# Patient Record
Sex: FEMALE | Race: WHITE | NOT HISPANIC OR LATINO | Employment: FULL TIME | URBAN - METROPOLITAN AREA
[De-identification: names, ages, dates, MRNs, and addresses within clinical notes are randomized per-mention and may not be internally consistent; named-entity substitution may affect disease eponyms.]

---

## 2023-08-04 ENCOUNTER — HOSPITAL ENCOUNTER (OUTPATIENT)
Dept: DATA CONVERSION | Facility: HOSPITAL | Age: 63
End: 2023-08-05
Attending: ORTHOPAEDIC SURGERY | Admitting: ORTHOPAEDIC SURGERY
Payer: COMMERCIAL

## 2023-08-04 DIAGNOSIS — M17.11 UNILATERAL PRIMARY OSTEOARTHRITIS, RIGHT KNEE: ICD-10-CM

## 2023-08-04 LAB
POCT GLUCOSE: 173 MG/DL (ref 74–99)
POCT GLUCOSE: 209 MG/DL (ref 74–99)
POCT GLUCOSE: 210 MG/DL (ref 74–99)

## 2023-08-05 LAB
ANION GAP IN SER/PLAS: 13 MMOL/L (ref 10–20)
BASOPHILS (10*3/UL) IN BLOOD BY AUTOMATED COUNT: 0.04 X10E9/L (ref 0–0.1)
BASOPHILS/100 LEUKOCYTES IN BLOOD BY AUTOMATED COUNT: 0.2 % (ref 0–2)
CALCIUM (MG/DL) IN SER/PLAS: 8.2 MG/DL (ref 8.6–10.3)
CARBON DIOXIDE, TOTAL (MMOL/L) IN SER/PLAS: 25 MMOL/L (ref 21–32)
CHLORIDE (MMOL/L) IN SER/PLAS: 104 MMOL/L (ref 98–107)
CREATININE (MG/DL) IN SER/PLAS: 0.9 MG/DL (ref 0.5–1.05)
ERYTHROCYTE DISTRIBUTION WIDTH (RATIO) BY AUTOMATED COUNT: 13.4 % (ref 11.5–14.5)
ERYTHROCYTE MEAN CORPUSCULAR HEMOGLOBIN CONCENTRATION (G/DL) BY AUTOMATED: 31.1 G/DL (ref 32–36)
ERYTHROCYTE MEAN CORPUSCULAR VOLUME (FL) BY AUTOMATED COUNT: 83 FL (ref 80–100)
ERYTHROCYTES (10*6/UL) IN BLOOD BY AUTOMATED COUNT: 4.58 X10E12/L (ref 4–5.2)
GFR FEMALE: 73 ML/MIN/1.73M2
GLUCOSE (MG/DL) IN SER/PLAS: 253 MG/DL (ref 74–99)
HEMATOCRIT (%) IN BLOOD BY AUTOMATED COUNT: 38 % (ref 36–46)
HEMOGLOBIN (G/DL) IN BLOOD: 11.8 G/DL (ref 12–16)
IMMATURE GRANULOCYTES/100 LEUKOCYTES IN BLOOD BY AUTOMATED COUNT: 0.9 % (ref 0–0.9)
LEUKOCYTES (10*3/UL) IN BLOOD BY AUTOMATED COUNT: 17 X10E9/L (ref 4.4–11.3)
LYMPHOCYTES (10*3/UL) IN BLOOD BY AUTOMATED COUNT: 0.68 X10E9/L (ref 1.2–4.8)
LYMPHOCYTES/100 LEUKOCYTES IN BLOOD BY AUTOMATED COUNT: 4 % (ref 13–44)
MONOCYTES (10*3/UL) IN BLOOD BY AUTOMATED COUNT: 1.12 X10E9/L (ref 0.1–1)
MONOCYTES/100 LEUKOCYTES IN BLOOD BY AUTOMATED COUNT: 6.6 % (ref 2–10)
NEUTROPHILS (10*3/UL) IN BLOOD BY AUTOMATED COUNT: 15.01 X10E9/L (ref 1.2–7.7)
NEUTROPHILS/100 LEUKOCYTES IN BLOOD BY AUTOMATED COUNT: 88.3 % (ref 40–80)
PLATELETS (10*3/UL) IN BLOOD AUTOMATED COUNT: 264 X10E9/L (ref 150–450)
POCT GLUCOSE: 247 MG/DL (ref 74–99)
POCT GLUCOSE: 262 MG/DL (ref 74–99)
POTASSIUM (MMOL/L) IN SER/PLAS: 4.5 MMOL/L (ref 3.5–5.3)
SODIUM (MMOL/L) IN SER/PLAS: 137 MMOL/L (ref 136–145)
UREA NITROGEN (MG/DL) IN SER/PLAS: 21 MG/DL (ref 6–23)

## 2023-08-06 LAB
ANION GAP IN SER/PLAS: NORMAL
BASOPHILS (10*3/UL) IN BLOOD BY AUTOMATED COUNT: NORMAL
BASOPHILS/100 LEUKOCYTES IN BLOOD BY AUTOMATED COUNT: NORMAL
CALCIUM (MG/DL) IN SER/PLAS: NORMAL
CARBON DIOXIDE, TOTAL (MMOL/L) IN SER/PLAS: NORMAL
CHLORIDE (MMOL/L) IN SER/PLAS: NORMAL
CREATININE (MG/DL) IN SER/PLAS: NORMAL
EOSINOPHILS (10*3/UL) IN BLOOD BY AUTOMATED COUNT: NORMAL
EOSINOPHILS/100 LEUKOCYTES IN BLOOD BY AUTOMATED COUNT: NORMAL
ERYTHROCYTE DISTRIBUTION WIDTH (RATIO) BY AUTOMATED COUNT: NORMAL
ERYTHROCYTE MEAN CORPUSCULAR HEMOGLOBIN CONCENTRATION (G/DL) BY AUTOMATED: NORMAL
ERYTHROCYTE MEAN CORPUSCULAR VOLUME (FL) BY AUTOMATED COUNT: NORMAL
ERYTHROCYTES (10*6/UL) IN BLOOD BY AUTOMATED COUNT: NORMAL
GFR FEMALE: NORMAL
GFR MALE: NORMAL
GLUCOSE (MG/DL) IN SER/PLAS: NORMAL
HEMATOCRIT (%) IN BLOOD BY AUTOMATED COUNT: NORMAL
HEMOGLOBIN (G/DL) IN BLOOD: NORMAL
IMMATURE GRANULOCYTES/100 LEUKOCYTES IN BLOOD BY AUTOMATED COUNT: NORMAL
LEUKOCYTES (10*3/UL) IN BLOOD BY AUTOMATED COUNT: NORMAL
LYMPHOCYTES (10*3/UL) IN BLOOD BY AUTOMATED COUNT: NORMAL
LYMPHOCYTES/100 LEUKOCYTES IN BLOOD BY AUTOMATED COUNT: NORMAL
MANUAL DIFFERENTIAL Y/N: NORMAL
MONOCYTES (10*3/UL) IN BLOOD BY AUTOMATED COUNT: NORMAL
MONOCYTES/100 LEUKOCYTES IN BLOOD BY AUTOMATED COUNT: NORMAL
NEUTROPHILS (10*3/UL) IN BLOOD BY AUTOMATED COUNT: NORMAL
NEUTROPHILS/100 LEUKOCYTES IN BLOOD BY AUTOMATED COUNT: NORMAL
NRBC (PER 100 WBCS) BY AUTOMATED COUNT: NORMAL
PLATELETS (10*3/UL) IN BLOOD AUTOMATED COUNT: NORMAL
POTASSIUM (MMOL/L) IN SER/PLAS: NORMAL
SODIUM (MMOL/L) IN SER/PLAS: NORMAL
UREA NITROGEN (MG/DL) IN SER/PLAS: NORMAL

## 2023-09-30 NOTE — PROGRESS NOTES
Service: Orthopaedics     Subjective Data:   DEMETRIO GARCIA is a 63 year old Female who is Hospital Day # 2 and POD #1 for 1. RIGHT TOTAL KNEE ARTHROPLASTY;    Objective Data:     Objective Information:      T   P  R  BP   MAP  SpO2   Value  36.5  67 18 159/77   110  94%  Date/Time 8/5 5:28 8/5 5:28 8/5 5:28 8/5 5:28  8/5 5:28 8/5 5:28  Range  (36.3C - 36.5C )  (67 - 71 )  (18 - 18 )  (146 - 159 )/ (71 - 77 )  (102 - 110 )  (94% - 96% )      Pain reported at 8/5 5:59: sleeping      T   P  R  BP   MAP  SpO2   Value  36.5  67 18 159/77   110  94%  Date/Time 8/5 5:28 8/5 5:28 8/5 5:28 8/5 5:28  8/5 5:28 8/5 5:28  Range  (36.3C - 36.5C )  (67 - 71 )  (18 - 18 )  (146 - 159 )/ (71 - 77 )  (102 - 110 )  (94% - 96% )        Pain reported at 8/5 5:59: sleeping         Weights   8/4 10:14: Weight in kg (Weight (kg))  120.2  8/4 10:14: Weight in lbs ((lbs))  264.9    Medication:    Medications:          Continuous Medications       --------------------------------    1. Lactated Ringers Infusion:  1000  mL  IntraVenous  <Continuous>    2. Lactated Ringers Infusion:  1000  mL  IntraVenous  <Continuous>         Scheduled Medications       --------------------------------    1. Acetaminophen:  650  mg  Oral  Every 6 Hours    2. Docusate:  100  mg  Oral  2 Times a Day    3. Empagliflozin:  25  mg  Oral  Daily    4. Insulin Lispro Mild Corrective Scale:  unit(s)  SubCutaneous  3 Times a Day Before Meals    5. Ketorolac Injectable:  15  mg  IntraVenous Push  Every 6 Hours    6. Metoprolol Succinate Extended Release:  100  mg  Oral  Daily    7. Pantoprazole:  40  mg  Oral  Daily    8. Polyethylene Glycol:  17  gram(s)  Oral  2 Times a Day    9. Rivaroxaban:  10  mg  Oral  Daily         PRN Medications       --------------------------------    1. Cyclobenzaprine:  10  mg  Oral  3 Times a Day    2. Dextrose 50% in Water Injectable:  25  gram(s)  IntraVenous Push  Every 15 Minutes    3. diphenhydrAMINE Injectable:  12.5   mg  IntraVenous Push  Every 6 Hours    4. Glucagon Injectable:  1  mg  IntraMuscular  Every 15 Minutes    5. Magnesium Hydroxide -Al Hydrox -Simethicone Oral Liquid:  30  mL  Oral  Every 6 Hours    6. Morphine Injectable:  2  mg  IntraVenous Push  Every 4 Hours    7. Ondansetron Injectable:  4  mg  IntraVenous Push  Every 6 Hours    8. oxyCODONE Immediate Release:  2.5  mg  Oral  Every 4 Hours    9. oxyCODONE Immediate Release:  5  mg  Oral  Every 4 Hours    10. oxyCODONE Immediate Release:  7.5  mg  Oral  Every 4 Hours        Recent Lab Results:    Results:    CBC: 8/5/2023 07:14              \     Hgb     /                              \     11.8 L    /  WBC  ----------------  Plt               17.0 H    ----------------    264              /     Hct     \                              /     38.0       \            RBC: 4.58     MCV: 83     Neutrophil %: 88.3      BMP: 8/5/2023 07:15  NA+        Cl-     BUN  /                         137    104    21  /  --------------------------------  Glucose                ---------------------------  253 H    K+     HCO3-   Creat \                         4.5  25    0.90  \  Calcium : 8.2 L    Anion Gap : 13        I have reviewed these laboratory results: Basic Metabolic Panel [Drawn 05-Aug-2023 07:15:00], Complete Blood Count + Differential [Drawn 05-Aug-2023 07:14:00].    Assessment and Plan:        Admitting Dx:   Status post total knee replacement: Entered Date: 04-Aug-2023  13:11    Comorbidities:  ·  Comorbidity obesity   ·  Obesity morbid obesity (BMI 40+)   ·  BMI 44.1     Code Status:  ·  Code Status Full Code     Assessment:    Assessment    pt is doing really good.  able to move around, has been able to ambulate and use the restroom, pain is well controlled, denies any nausea vomiting, SOB or chest pain. doing. pt's incision is clean dry and intact and area is soft. pt is doing  good and  states he is ready to get going. pt would like to go to skilled  facility- but we spoke with her that she does really  good       PLAN   PT/ OT and continued mobility   Home with HHC   has a walker   DVT prophylaxis    pain meds      Electronic Signatures:  Perez Walls ()   (Signed 05-Aug-2023 09:20)   Co-Signer: Service, Assessment/Plan Review, Subjective Data, Objective Data, Assessment and Plan, Note Completion  Laurie Shabazz (APRN-CNP)   (Signed 05-Aug-2023 08:41)   Authored: Service, Assessment/Plan Review, Subjective Data, Objective Data, Assessment and Plan, Note Completion    Last Updated: 05-Aug-2023 09:20 by Perez Walls ()

## 2023-09-30 NOTE — H&P
History & Physical Reviewed:   I have reviewed the History and Physical dated:  02-Aug-2023   History and Physical reviewed and relevant findings noted. Patient examined to review pertinent physical  findings.: No significant changes   Home Medications Reviewed: no changes noted   Allergies Reviewed: no changes noted     Consent:   COVID-19 Consent:  ·  COVID-19 Risk Consent Surgeon has reviewed key risks related to the risk of nirmal COVID-19 and if they contract COVID-19 what the risks are.       Electronic Signatures:  Isaiah Shaw)  (Signed 04-Aug-2023 12:05)   Authored: History & Physical Reviewed, Consent, Note  Completion      Last Updated: 04-Aug-2023 12:05 by Isaiah Shaw)

## 2023-09-30 NOTE — DISCHARGE SUMMARY
Send Summary:   Discharge Summary Providers:  Provider Role Provider Name   · Referring Mary Cortez   · Attending Isaiah Shaw   · Primary Mary Cortez   · Primary Richard Montes De Oca       Note Recipients: Mary Cortez DO (Resident)  Isaiah Shaw MD - [id number] [PREFERRED]       Discharge:    Summary:   Admission Date: .04-Aug-2023 09:20:00   Discharge Date: 05-Aug-2023   Attending Physician at Discharge: Isaiah Shaw   Admission Reason: s/p total knee   Final Discharge Diagnoses: Status post total knee  replacement   Procedures: Date: 04-Aug-2023 14:32:00  Procedure Name: 1. RIGHT TOTAL KNEE ARTHROPLASTY   Condition at Discharge: Satisfactory   Disposition at Discharge: .Home   Vital Signs:        T   P  R  BP   MAP  SpO2   Value  36.1  67  20  128/62   89  95%  Date/Time 8/5 8:55 8/5 8:55 8/5 8:55 8/5 8:55  8/5 8:55 8/5 8:55  Range  (36.1C - 36.5C )  (67 - 71 )  (18 - 20 )  (128 - 159 )/ (62 - 77 )  (89 - 110 )  (94% - 96% )    Date:            Weight/Scale Type:  Height:   04-Aug-2023 10:14  120.2  kg / standing       Hospital Course:                                                  Discharge summary  This patient  was admitted to the hospital on 8/4/23 after undergoing total  knee without complications that morning.    During the postoperative period, while in hospital, patient was medically managed by the hospitalist. Please see medial notes and H&P for patients additional diagnoses.  Ortho agrees with all medical diagnoses and treatments while patient in hospital.  No significant or unexpected findings or abnormal ortho imaging were noted during the hospital stay  Hospital course  Patient tolerated surgical procedure well and there was no complications. Patient progressed adequately through their recovery during hospital stay including PT and rehabilitation.  DVT prophylaxis was implemented POD#1  Patient was then D/C to home in stable condition.    Patient was instructed on the use of pain  medications, the signs and symptoms of infection, and was given our number to call should they have any questions or concerns following discharge.          Discharge Information:    and Continuing Care:   Lab Results - Pending:    None  Radiology Results - Pending: None   Discharge Instructions:    Activity:           activity as tolerated.          May shower..            May not return to school/work.            May not drive.            Weight-bearing Instructions: full weight bearing.  per total knee    Nutrition/Diet:           resume normal diet    Wound Care:           Wound Site:   right knee          Change Dressin time   peel off rubber dressing on 23    Additional Orders:           Additional Instructions:                                                        Total Knee Replacement                                                                                                    Discharge Instructions    To prevent Clot formation, you have been placed on the following medication:    anticoagulation                                              Surgical Site Care:    Change dressing once a day and PRN (as needed).     Apply 4 x 4 sponge and light tape.     If glue present, leave open to air.    You may leave wound open to air after initial dressing removal, if wound is clean, dry and intact     Aquacel Ag dressing is present, do not remove dressing for 7 days, unless heavily saturated.    If heavily saturated, remove dressing and start using instructions above     Staples will be removed on post-operative day 14 and steri-strips applied Showering is permitted starting post op day 1 if waterproof Aquacel dressing is present or when the incision is covered with 4 x 4 and Tegaderm waterproof dressing     Until all areas of incision are healed.                                                 Physical Therapy:        Weight Bearing Status:  Weight bear as tolerated     total knee- protocol                                                                                                                             Pain Medications        You were given narcotics     Wean off pain medications as you deem appropriate as long as pain is under control Cold packs/Ice packs/Machine May be used every hour for 15-30 minutes as tolerated Be sure to have a barrier (cloth, clothing, towel) between the site an the ice pack to  prevent frostbite.    incentive spirometer 10 x every hour while awake for 2 weeks     surgical teds x3 weeks- removing only for skin care and hygiene     IF INCISION STARTS TO DRAIN CALL OFFICE RIGHT A WAY    Contact [facility] office if    Increased redness, swelling, drainage of any kind, and/or pain to surgery site. As well as new onset fevers and or chills. These could signify an infection. Calf or thigh tenderness to touch as well as increased swelling or redness. This could signify  a clot formation. Numbness or tingling to an area around the incision site or below the incision site (toes).Any rash appears, increased or new onset nausea/vomiting occur. This may indicate a reaction to a medication.    Phone # [phone].                                    Follow up with Surgeon in 2 weeks or as scheduled by the office     Home Care Certification:           Home Care Agency:    [facility] [phone]          Skilled Disciplines Ordered:   PT,  OT    Home Care Services:           Home Care Skilled Service:   Rehab (PT/OT/SP eval and treat),  wound care    Follow Up Appointments:    Follow-Up Appointment 01:           Physician/Dept/Service:   Dr RICARDA Darling          Reason for Referral:   right knee- post op          Call to Schedule in:   2 weeks,  or as already scheduled by the office          Location:   [address] [place]          Phone Number:   [phone]    Discharge Medications: Home Medication   Jardiance 25 mg oral tablet - 1 tab(s) orally once a day  (in the morning)  irbesartan 300 mg oral tablet - 1 tab(s) orally once a day  metFORMIN 1000 mg oral tablet - 1 tab(s) orally 2 times a day  metoprolol succinate 100 mg oral capsule, extended release - 1 cap(s) orally once a day-instructed to take morning of procedure with a sip of water  Januvia - 1 tab(s) orally once a day-  glimepiride 4 mg oral tablet - 1 tab(s) orally 2 times a day  oxyCODONE 5 mg oral tablet - 1 tab(s) orally every 4 hours, As needed, Pain - 7-10    and take 0.5 tabs (2.5mg) for pain 4-6  docusate sodium 100 mg oral capsule - 1 cap(s) orally 2 times a day  pantoprazole 40 mg oral delayed release tablet - 1 tab(s) orally once a day  apixaban 2.5 mg oral tablet - 1 tab(s) orally 2 times a day      PRN Medication   cyclobenzaprine 10 mg oral tablet - 1 tab(s) orally 3 times a day, As needed, Muscle Spasms. if a whole pill makes you too tired- please try half  carisoprodol 350 mg oral tablet - 1 tab(s) orally once a day, As Needed     DNR Status:   ·  Code Status Code Status order at time of discharge: Full Code       Electronic Signatures:  Laurie Shabazz (APRN-CNP)  (Signed 05-Aug-2023 11:26)   Authored: Send Summary, Summary Content, Ongoing Care,  DNR Status, Note Completion      Last Updated: 05-Aug-2023 11:26 by Laurie Shabazz ([facility]-CNP)

## 2023-10-01 NOTE — OP NOTE
PROCEDURE DETAILS    Preoperative Diagnosis:  Right knee degenerative joint disease    Postoperative Diagnosis:  Right knee degenerative joint disease    Surgeon: Isaiah Shaw  Resident/Fellow/Other Assistant: Niranjan Wesley    Procedure:  1. RIGHT TOTAL KNEE ARTHROPLASTY    Anesthesia: Spinal, sedation, regional   Estimated Blood Loss: 50 ml   Findings: see op report         Operative Report:   Implants: Charly triathlon size 4 CR femur size 4 tibia, 11 CS articular surface 32 patella  Operative findings:  Severe degenerative joint disease    Operative Indications:  Failed multiple non-surgical modalities and great difficulty performing activities of daily living as indicated in pre-operative notes.     Operative Procedure:  Patient was met prior to surgery in pre-operative holding.  The appropriate extremity was marked and recent health status was reviewed and we found no contraindication to proceeding with the scheduled procedure.  We again reviewed the risks of infection,  wound issues, deep venous thrombosis, pulmonary embolism, medical complications including cardiac and pulmonary, neurologic and vascular injury and incomplete relief of pre-operative pain.    The patient then underwent regional anesthesia in pre-op holding.  The patient was transported to the operating room.  A thigh tourniquet was placed and a small bump on the buttock.  The patient was resting comfortably in a supine position with all jorge  prominences well padded.  Sequential compression device was placed on the contralateral lower extremity.  An exam under anesthesia was performed to evaluate the patient´s range of motion and ligamentous integrity.    The patient was prepped and draped in the usual sterile fashion.  The leg was placed in a leg pendleton after appropriate padding.  After prep, drape, antibiotic and time out, the leg was exsanguinated and the tourniquet inflated.  A longitudinal incision  was made over the anterior  knee.  The dissection was carried out down through the skin and subcutaneous tissue.  A medial parapatellar arthrotomy was performed.  An effusion and synovitis was noted compatible with the grade IV changes of the articular  cartilage.  The fat pad was de-bulked, [place]´s line was marked and some of the deep medial collateral ligament was released from the tibia.  The ACL was resected. The knee was flexed up and medial and lateral retractors were placed to protect the  collateral ligaments and popliteus tendon.      Due to the degree of deformity and stiffness, the posterior cruciate ligament was resected.    A canal finder reamer was utilized to gain entry into the femur.  An intramedullary krishna was placed by hand and set to 5 degrees of valgus for the distal femoral cut.  The cutting block was placed and the distal femoral cut was performed.   A sizing guide  was then placed and the femoral size was measured based on posterior referencing.  The 4 in 1 cutting block was placed set to 3 degrees of external rotation.  The rotation was confirmed based on the transepicondylar axis and Carmen´s line.  There  was no significant hypoplasia of the posterior condyles.     Once the cutting block was placed the cuts were performed: anterior, posterior and chamfer cuts.  There collaterals were protected and the bone was removed.  There was no notching of the femur.   Once the femoral cuts were complete, we turned our attention  to the tibia.  Retractors were placed medial, lateral and posteriorly.  An extramedullary alignment krishna was used and the slope was set in accordance with the implant.  We measured 2 mm of resection from the lowest point on the tibia.  The tibial cut was  completed with care not to encroach posterior to the knee joint.    After the tibial cut was completed, we removed the remaining menisci.  Using a curved osteotome posterior osteophytes were carefully removed from the femur.  We then assessed the  flexion and extension gaps using trial spacer blocks.    The gaps appeared symmetric with a 9 mm spacer and we proceeded to placing trial implants.  The final poly was a 11 CS.    With symmetric gaps we progressed to placing trial implants.    A trial tibial component was placed with care to avoid overhang.  The rotation was set in line with the medial third of the tibial tubercle.  A trial femoral component was then placed, followed by a trial articular surface.  The knee was taken through  range of motion with the provisional components.  The flexion and extension gaps were evaluated, as well as the patellar tracking and mid-flexion stability.  The following soft tissue balancing, recuts, and/or modifications were required: none.      The patella was everted and ~9 mm of bone were removed from the thickest portion using a clamp/cutting guide.  The lug holes were drilled in the patella and femur.  The tibia was prepared with the drill and broach after confirmation of alignment using  a drop krishna.   The sclerotic areas of the bone were drilled using a small drill bit.  The capsule around the knee was injected using a long acting local anesthetic.    The cement (Omiro simplex) was mixed in a vacuum sealed fashion while the bone was pulsatile lavaged.   The bone was dried and the implants were placed with a gentle posterior directed force and a clamp on the patella.  The excess cement was removed.    After the cement dried the gap balancing was reassessed prior to placing the final articular surface.  The proud cement mantle was removed using a small osteotome.  The knee was then copiously lavaged with sterile saline and Irrisept (0.05% chlorhexidine  gluconate).  The tourniquet was taken down and hemostasis was obtained using Bovie electrocautery and tranexamic acid (per protocol).  No significant bleeders were encountered and the usage of a drain was not felt to be necessary.    A layered closure was performed using  1 Vicryl and 1 Stratafix in the retinacular layer and quadriceps tendon.  2-0 vicryl in the deep dermal layer and monofilament in the skin.  An adherent, water proof dressing was placed followed by Webril and an ace  bandage. All counts were reported as correct.  The patient was stable to the post anesthesia care unit.    I was present and participated in the entire procedure. The Physician Assistant participated in all critical elements of the procedure under my direct supervision. The surgical incision was closed by the PA under my indirect supervision. There were no  qualified residents available to assist.    Complications:  none  Estimated blood loss:  50 ml  Specimens:  None      The physician assistant was present for the entire case.  Given the nature of the procedure and disease process a skilled surgical assistant was  necessary for the case.  The assistant was necessary for retraction and helped directly facilitate completion of the surgery.  A certified scrub tech was at the back table managing instruments and supplies for the surgical procedure.  Note Recipients:   Mary Cortez DO (Resident)  Isaiah Shaw MD - [phone] [PREFERRED]                        Attestation:   Note Completion:  Attending Attestation I performed the procedure without a resident         Electronic Signatures:  Isaiah Shaw)  (Signed 04-Aug-2023 14:34)   Authored: Post-Operative Note, Chart Review, Note Completion      Last Updated: 04-Aug-2023 14:34 by Isaiah Shaw)

## 2023-10-02 ENCOUNTER — TREATMENT (OUTPATIENT)
Dept: PHYSICAL THERAPY | Facility: CLINIC | Age: 63
End: 2023-10-02
Payer: COMMERCIAL

## 2023-10-02 DIAGNOSIS — M17.11 PRIMARY OSTEOARTHRITIS OF RIGHT KNEE: Primary | ICD-10-CM

## 2023-10-02 PROCEDURE — 97110 THERAPEUTIC EXERCISES: CPT | Mod: GP | Performed by: PHYSICAL THERAPIST

## 2023-10-02 ASSESSMENT — PAIN SCALES - GENERAL: PAINLEVEL_OUTOF10: 2

## 2023-10-02 ASSESSMENT — ENCOUNTER SYMPTOMS
LOSS OF SENSATION IN FEET: 1
DEPRESSION: 0
OCCASIONAL FEELINGS OF UNSTEADINESS: 0

## 2023-10-02 NOTE — PROGRESS NOTES
Insurance    Insurance reviewed   Visit number: 7   Approved number of visits: 12   80/20  30 visits       Subjective:  Patient reports that her R knee is feeling better, a little stiff today.      Objective:  R knee flexion AROM 107 degrees after exercise  R knee flexion AROM 110 degrees after stretching  R knee flexion PROM 115 degrees after stretching    Assessment:  Therapeutic exercise performed in order to improve R knee strength/ROM/mobility.   PROM performed to improve R knee mobility and reduce overall stiffness/pain.  Patient progressing well towards her goals as well as her overall L knee strength/ROM.    Goals from IE:    By the end of 12 visits patient will be able to do the following with < 1/10 R knee pain:    HEP: Patient will consistently perform her home exercise program for 20-30 minute sessions, 1-2x/day, 3-4 days/week independently by the end of 12 visits.    Basic ADL's: Patient will perform bADL’s/instrumental ADL’s for 30 minutes moving between various closed kinetic chain postures.    ROM and Strength: Patient will demonstrate 5/5 R knee strength in all planes and 0-120 degrees R knee AROM in all planes to improve their ability to lift, stand, ambulate and perform basic ADL's.    Stair Negotiation: Patient will be able to ambulate up/down stairs for 1-2 flights at a time.    Gait/Locomotion: Patient will be able to ambulate for 30-60 minutes at a time.  Minimal to no gait deviation across level ground/stairs.    Work: Patient will return to work and perform normal work duties.    Sleep: Patient will sleep thru the night 4/7 nights/week.    Participation restrictions: Increase LEFS to > or = to 65/80 for increased functional ability.     Plan:    Continue with plan of care.

## 2023-10-04 ENCOUNTER — TREATMENT (OUTPATIENT)
Dept: PHYSICAL THERAPY | Facility: CLINIC | Age: 63
End: 2023-10-04
Payer: COMMERCIAL

## 2023-10-04 DIAGNOSIS — M17.11 PRIMARY OSTEOARTHRITIS OF RIGHT KNEE: Primary | ICD-10-CM

## 2023-10-04 PROCEDURE — 97110 THERAPEUTIC EXERCISES: CPT | Mod: GP | Performed by: PHYSICAL THERAPIST

## 2023-10-04 ASSESSMENT — PAIN SCALES - GENERAL: PAINLEVEL_OUTOF10: 2

## 2023-10-04 NOTE — PROGRESS NOTES
Physical Therapy    Physical Therapy Treatment    Patient Name: Savi Govea  MRN: [id number]  Today's Date: 10/4/2023      Insurance    Insurance reviewed   Visit number: 8  Approved number of visits: 12   80/20  30 visits       Assessment/Plan   PT Assessment  PT Assessment Results: Decreased strength, Decreased range of motion, Decreased endurance, Decreased mobility  Rehab Prognosis: GoodAssessment:  Therapeutic exercise performed in order to improve R knee strength/ROM/mobility.   PROM performed to improve R knee mobility and reduce overall stiffness/pain.  Patient progressing well towards her goals as well as her overall R knee strength/ROM.    Plan:  Continue with plan of care.        Subjective   Precautions:  Precautions  STEADI Fall Risk Score (The score of 4 or more indicates an increased risk of falling): 0  Precautions Comment: s/p R TKA 8/4/2023 by Dr. Isaiah Shaw    Subjective:  Patient reports that her R knee is feeling better, a little stiff today.       Objective   Pain:  Pain Assessment  Pain Score: 2  Pain Type: Chronic pain  Pain Location: Knee  Pain Orientation: Right     R knee flexion AAROM 114 degrees after exercise    Treatments:  Therapeutic Exercise  Therapeutic Exercise Performed: Yes  Therapeutic Exercise Activity 1: Bike x 7 minutes  Therapeutic Exercise Activity 2: Lunge stretch at steps, x 30 reps  Therapeutic Exercise Activity 3: Mini-Squats x 30 reps  Therapeutic Exercise Activity 4: Forward step-ups 6 inch step, x 30 reps  Therapeutic Exercise Activity 5: Lateral Step-ups, 4 inch step, x 30 reps  Therapeutic Exercise Activity 6: Cybex Leg press 2 plates, 3 sets, 10 reps  Therapeutic Exercise Activity 7: [place] ball squat x 20 reps  Therapeutic Exercise Activity 8: Cable column TKE's 4 plates, 3 sets, 10 reps  Therapeutic Exercise Activity 9: supine bridges x 30 reps  Therapeutic Exercise Activity 10: supine heel slides x 30 reps    Treatment    Time in clinic started at   2pm  Time in clinic ended at  2:43pm  Total time in clinic is . 43 minutes  Total timed code time is  38 minutes    Treatment Performed Today:.   TE x 3 units

## 2023-10-08 PROBLEM — M16.12 OSTEOARTHRITIS OF LEFT HIP: Status: ACTIVE | Noted: 2023-10-08

## 2023-10-08 PROBLEM — M54.12 CERVICAL NEURITIS: Status: ACTIVE | Noted: 2023-10-08

## 2023-10-08 PROBLEM — E66.9 OBESITY: Status: ACTIVE | Noted: 2023-10-08

## 2023-10-08 PROBLEM — R73.9 HYPERGLYCEMIA: Status: ACTIVE | Noted: 2023-10-08

## 2023-10-08 PROBLEM — M62.89 ABNORMAL INCREASED MUSCLE TONE: Status: ACTIVE | Noted: 2017-01-18

## 2023-10-08 PROBLEM — M77.8 SHOULDER CAPSULITIS, RIGHT: Status: ACTIVE | Noted: 2023-10-08

## 2023-10-08 PROBLEM — M25.512 CHRONIC LEFT SHOULDER PAIN: Status: ACTIVE | Noted: 2017-01-18

## 2023-10-08 PROBLEM — M54.2 NECK PAIN: Status: ACTIVE | Noted: 2017-01-18

## 2023-10-08 PROBLEM — E11.9 DIABETES MELLITUS (MULTI): Status: ACTIVE | Noted: 2023-10-08

## 2023-10-08 PROBLEM — M54.12 LEFT CERVICAL RADICULOPATHY: Status: ACTIVE | Noted: 2017-01-18

## 2023-10-08 PROBLEM — M47.22 CERVICAL SPONDYLOSIS WITH RADICULOPATHY: Status: ACTIVE | Noted: 2017-01-18

## 2023-10-08 PROBLEM — M54.59 MECHANICAL LOW BACK PAIN: Status: ACTIVE | Noted: 2017-01-18

## 2023-10-08 PROBLEM — Z04.9 CONDITION NOT FOUND: Status: ACTIVE | Noted: 2023-10-08

## 2023-10-08 PROBLEM — M12.9 ARTHROPATHY OF MULTIPLE SITES: Status: ACTIVE | Noted: 2023-10-08

## 2023-10-08 PROBLEM — R53.83 FATIGUE: Status: ACTIVE | Noted: 2023-10-08

## 2023-10-08 PROBLEM — E78.5 HYPERLIPIDEMIA: Status: ACTIVE | Noted: 2023-10-08

## 2023-10-08 PROBLEM — M54.16 ACUTE LUMBAR RADICULOPATHY: Status: ACTIVE | Noted: 2023-10-08

## 2023-10-08 PROBLEM — M67.40 GANGLION CYST: Status: ACTIVE | Noted: 2023-10-08

## 2023-10-08 PROBLEM — G89.29 CHRONIC LEFT SHOULDER PAIN: Status: ACTIVE | Noted: 2017-01-18

## 2023-10-08 PROBLEM — Z96.651 STATUS POST RIGHT KNEE REPLACEMENT: Status: ACTIVE | Noted: 2023-10-08

## 2023-10-08 PROBLEM — E66.9 OBESITY: Status: ACTIVE | Noted: 2017-01-18

## 2023-10-08 PROBLEM — M25.569 KNEE PAIN: Status: ACTIVE | Noted: 2023-10-08

## 2023-10-08 RX ORDER — ALPHA LIPOIC ACID 300 MG
1 CAPSULE ORAL 2 TIMES DAILY
COMMUNITY

## 2023-10-08 RX ORDER — MELOXICAM 15 MG/1
15 TABLET ORAL DAILY
COMMUNITY
Start: 2014-12-16

## 2023-10-08 RX ORDER — SITAGLIPTIN 100 MG/1
1 TABLET, FILM COATED ORAL DAILY
COMMUNITY
Start: 2023-08-03

## 2023-10-08 RX ORDER — PHENYLEPHRINE HCL 10 MG
500 TABLET ORAL 2 TIMES DAILY
COMMUNITY
Start: 2019-10-02

## 2023-10-08 RX ORDER — MULTIVITAMIN
1 TABLET ORAL
COMMUNITY

## 2023-10-08 RX ORDER — BLOOD-GLUCOSE METER
EACH MISCELLANEOUS
COMMUNITY
Start: 2023-08-28

## 2023-10-08 RX ORDER — IRBESARTAN 300 MG/1
300 TABLET ORAL DAILY
COMMUNITY
Start: 2019-10-02

## 2023-10-08 RX ORDER — METFORMIN HYDROCHLORIDE 1000 MG/1
1000 TABLET ORAL 2 TIMES DAILY
COMMUNITY
Start: 2018-08-31 | End: 2023-12-21 | Stop reason: WASHOUT

## 2023-10-08 RX ORDER — LISINOPRIL 40 MG/1
1 TABLET ORAL DAILY
COMMUNITY
Start: 2019-10-02 | End: 2023-12-21 | Stop reason: WASHOUT

## 2023-10-08 RX ORDER — METFORMIN HYDROCHLORIDE 500 MG/1
1 TABLET ORAL 3 TIMES DAILY
COMMUNITY
End: 2023-12-21 | Stop reason: WASHOUT

## 2023-10-08 RX ORDER — FLUTICASONE PROPIONATE 50 MCG
1 SPRAY, SUSPENSION (ML) NASAL DAILY PRN
COMMUNITY
Start: 2023-08-29

## 2023-10-08 RX ORDER — METFORMIN HYDROCHLORIDE 500 MG/1
500 TABLET ORAL
COMMUNITY
End: 2023-12-21 | Stop reason: WASHOUT

## 2023-10-08 RX ORDER — NAPROXEN 500 MG/1
0.5 TABLET ORAL
COMMUNITY
Start: 2014-06-24 | End: 2023-12-21 | Stop reason: WASHOUT

## 2023-10-08 RX ORDER — LANCETS 33 GAUGE
EACH MISCELLANEOUS
COMMUNITY
Start: 2023-08-28

## 2023-10-08 RX ORDER — LORATADINE 10 MG/1
10 TABLET ORAL DAILY
COMMUNITY
Start: 2019-10-02

## 2023-10-08 RX ORDER — ONDANSETRON 4 MG/1
4 TABLET, ORALLY DISINTEGRATING ORAL EVERY 6 HOURS PRN
COMMUNITY
Start: 2023-08-28 | End: 2023-12-21 | Stop reason: ALTCHOICE

## 2023-10-08 RX ORDER — GABAPENTIN 600 MG/1
600 TABLET ORAL 3 TIMES DAILY
COMMUNITY
Start: 2017-01-23 | End: 2023-12-21 | Stop reason: WASHOUT

## 2023-10-08 RX ORDER — EMPAGLIFLOZIN 25 MG/1
25 TABLET, FILM COATED ORAL DAILY
COMMUNITY

## 2023-10-08 RX ORDER — GLIMEPIRIDE 2 MG/1
2 TABLET ORAL 2 TIMES DAILY
COMMUNITY
Start: 2019-03-07 | End: 2023-12-21 | Stop reason: WASHOUT

## 2023-10-08 RX ORDER — MAGNESIUM SULFATE 100 MG
CAPSULE ORAL
COMMUNITY
End: 2023-12-21 | Stop reason: WASHOUT

## 2023-10-08 RX ORDER — CARISOPRODOL 350 MG
350 TABLET ORAL DAILY
COMMUNITY

## 2023-10-08 RX ORDER — HYDROCODONE BITARTRATE AND ACETAMINOPHEN 5; 325 MG/1; MG/1
1 TABLET ORAL EVERY 8 HOURS PRN
COMMUNITY
End: 2024-01-06 | Stop reason: HOSPADM

## 2023-10-08 RX ORDER — AMMONIUM LACTATE 12 G/100G
LOTION TOPICAL DAILY PRN
COMMUNITY
Start: 2023-08-29

## 2023-10-08 RX ORDER — TAMSULOSIN HYDROCHLORIDE 0.4 MG/1
0.4 CAPSULE ORAL DAILY
COMMUNITY
Start: 2023-08-28 | End: 2023-12-21 | Stop reason: WASHOUT

## 2023-10-08 RX ORDER — CALCIUM CITRATE/VITAMIN D3 200-3.125
1 TABLET ORAL DAILY
COMMUNITY
Start: 2015-03-19

## 2023-10-08 RX ORDER — CALCIUM CARB, CITRATE, MALATE 250 MG
99 CAPSULE ORAL
COMMUNITY

## 2023-10-08 RX ORDER — OXYCODONE AND ACETAMINOPHEN 5; 325 MG/1; MG/1
2 TABLET ORAL EVERY 4 HOURS PRN
COMMUNITY
Start: 2012-02-17 | End: 2023-12-21 | Stop reason: WASHOUT

## 2023-10-08 RX ORDER — GLIMEPIRIDE 4 MG/1
TABLET ORAL
COMMUNITY
Start: 2023-08-09 | End: 2023-12-21 | Stop reason: WASHOUT

## 2023-10-08 RX ORDER — DICYCLOMINE HYDROCHLORIDE 20 MG/1
20 TABLET ORAL EVERY 6 HOURS
COMMUNITY
Start: 2023-08-28 | End: 2023-12-21 | Stop reason: ALTCHOICE

## 2023-10-08 RX ORDER — ZINC GLUCONATE 50 MG
1 TABLET ORAL DAILY
COMMUNITY

## 2023-10-09 ENCOUNTER — TREATMENT (OUTPATIENT)
Dept: PHYSICAL THERAPY | Facility: CLINIC | Age: 63
End: 2023-10-09
Payer: COMMERCIAL

## 2023-10-09 ENCOUNTER — APPOINTMENT (OUTPATIENT)
Dept: PHYSICAL THERAPY | Facility: CLINIC | Age: 63
End: 2023-10-09
Payer: COMMERCIAL

## 2023-10-09 DIAGNOSIS — M17.11 PRIMARY OSTEOARTHRITIS OF RIGHT KNEE: Primary | ICD-10-CM

## 2023-10-09 PROCEDURE — 97110 THERAPEUTIC EXERCISES: CPT | Mod: GP | Performed by: PHYSICAL THERAPIST

## 2023-10-09 ASSESSMENT — ENCOUNTER SYMPTOMS
OCCASIONAL FEELINGS OF UNSTEADINESS: 0
LOSS OF SENSATION IN FEET: 1
DEPRESSION: 0

## 2023-10-09 ASSESSMENT — PAIN SCALES - GENERAL: PAINLEVEL_OUTOF10: 2

## 2023-10-09 NOTE — PROGRESS NOTES
Physical Therapy    Physical Therapy Treatment    Patient Name: Savi Govea  MRN: [id number]  Today's Date: 10/9/2023      Insurance    Insurance reviewed   Visit number: 9  Approved number of visits: 12   80/20  30 visits       Diagnosis:  R knee OA, s/p R TKA 8/4/2023    Assessment/Plan   PT Assessment  PT Assessment Results: Decreased strength, Decreased range of motion, Decreased endurance, Impaired balance, Decreased mobility, Pain  Rehab Prognosis: Good  Evaluation/Treatment Tolerance: Patient tolerated treatment well, Patient limited by fatigue  Assessment:  Therapeutic exercise performed in order to improve R knee strength/ROM/mobility.   Patient progressing well towards her goals as well as her overall R knee strength/ROM.    Plan:  Continue with plan of care.      Subjective   Precautions:  Precautions  STEADI Fall Risk Score (The score of 4 or more indicates an increased risk of falling): 2  LE Weight Bearing Status: Weight Bearing as Tolerated  Splinting: none  Braces Applied: none  Precautions Comment: s/p R TKA 8/4/2023    Subjective:  Patient reports that her R knee is feeling a little sore today.     Objective   Pain:  Pain Assessment  Pain Score: 2  Pain Type: Chronic pain  Pain Location: Knee  Pain Orientation: Right     R knee flexion AAROM 115 degrees after exercise    Treatments:   Bike rocking x 7 minutes  Lunge stretch on step x 30 reps  Mini-squats x 30 reps  Forward/Lateral Step-ups 6 inch/4 inch x 30 reps each  HR x 30 reps  TB TKE's blue/maroon x 30 reps  Cybex Leg press 2 plates x 30 reps  Supine heel slides x 30 reps    Treatment    Time in clinic started at  2:30pm  Time in clinic ended at  3:13pm  Total time in clinic is . 43 minutes  Total timed code time is  38 minutes    Treatment Performed Today:.   TE x 3 units

## 2023-10-10 ENCOUNTER — APPOINTMENT (OUTPATIENT)
Dept: PHYSICAL THERAPY | Facility: CLINIC | Age: 63
End: 2023-10-10
Payer: COMMERCIAL

## 2023-10-11 ENCOUNTER — TREATMENT (OUTPATIENT)
Dept: PHYSICAL THERAPY | Facility: CLINIC | Age: 63
End: 2023-10-11
Payer: COMMERCIAL

## 2023-10-11 ENCOUNTER — APPOINTMENT (OUTPATIENT)
Dept: PHYSICAL THERAPY | Facility: CLINIC | Age: 63
End: 2023-10-11
Payer: COMMERCIAL

## 2023-10-11 DIAGNOSIS — M17.11 PRIMARY OSTEOARTHRITIS OF RIGHT KNEE: Primary | ICD-10-CM

## 2023-10-11 PROCEDURE — 97110 THERAPEUTIC EXERCISES: CPT | Mod: GP | Performed by: PHYSICAL THERAPIST

## 2023-10-11 ASSESSMENT — PAIN SCALES - GENERAL: PAINLEVEL_OUTOF10: 2

## 2023-10-11 NOTE — PROGRESS NOTES
Physical Therapy    Physical Therapy Treatment    Patient Name: Savi Govea  MRN: [id number]  Today's Date: 10/11/2023      Insurance    Insurance reviewed   Visit number: 10  Approved number of visits: 12   80/20  30 visits       Diagnosis:  R knee OA, s/p R TKA 8/4/2023    Time Calculation  Start Time: 1400  Stop Time: 1440  Time Calculation (min): 40 min    Assessment/Plan   PT Assessment  PT Assessment Results: Decreased strength, Decreased range of motion, Decreased endurance, Decreased mobility, Pain  Rehab Prognosis: Good  Evaluation/Treatment Tolerance: Patient tolerated treatment well  PT Plan  Inpatient/Swing Bed or Outpatient: Outpatient  OP PT Plan  Rehab Potential: Good  Plan of Care Agreement: Patient  Therapeutic exercise performed in order to improve R knee strength/ROM/mobility.  Patient requires increased tactile/verbal cues with exercise in order to reduce R knee stress/strain during the particular exercise performed.  Patient challenged with exercises performed in clinic secondary to R knee fatigue.     Plan:  progress as tolerated.    General Visit Information:   PT  Visit  PT Received On: 10/11/23     Subjective   Precautions:  Precautions  STEADI Fall Risk Score (The score of 4 or more indicates an increased risk of falling): 2  LE Weight Bearing Status: Weight Bearing as Tolerated  Braces Applied: none  Precautions Comment: s/p R TKA 8/4/2023    Objective   Pain:  Pain Assessment  Pain Score: 2  Pain Type: Chronic pain  Pain Location: Knee  Pain Orientation: Right    Treatments:  Therapeutic Exercise  Therapeutic Exercise Performed: Yes  Therapeutic Exercise Activity 1: Bike x 7 minutes  Therapeutic Exercise Activity 2: Lunge stretch at steps, x 30 reps  Therapeutic Exercise Activity 3: Mini-Squats x 30 reps  Therapeutic Exercise Activity 4: Forward step-ups 8 inch step, x 30 reps  Therapeutic Exercise Activity 5: Lateral Step-ups, 6 inch step, x 30 reps  Therapeutic Exercise Activity 6:  Cybex Leg press 2 plates, 3 sets, 10 reps  Therapeutic Exercise Activity 7: [place]uran ball squat x 20 reps  Therapeutic Exercise Activity 8: Cable column TKE's 4 plates, 3 sets, 10 reps  Therapeutic Exercise Activity 9: supine bridges x 30 reps  Therapeutic Exercise Activity 10: supine heel slides x 30 reps

## 2023-10-12 ENCOUNTER — APPOINTMENT (OUTPATIENT)
Dept: PHYSICAL THERAPY | Facility: CLINIC | Age: 63
End: 2023-10-12
Payer: COMMERCIAL

## 2023-10-16 ENCOUNTER — TREATMENT (OUTPATIENT)
Dept: PHYSICAL THERAPY | Facility: CLINIC | Age: 63
End: 2023-10-16
Payer: COMMERCIAL

## 2023-10-16 DIAGNOSIS — M17.11 PRIMARY OSTEOARTHRITIS OF RIGHT KNEE: Primary | ICD-10-CM

## 2023-10-16 PROCEDURE — 97110 THERAPEUTIC EXERCISES: CPT | Mod: GP | Performed by: PHYSICAL THERAPIST

## 2023-10-16 ASSESSMENT — PAIN SCALES - GENERAL: PAINLEVEL_OUTOF10: 2

## 2023-10-16 NOTE — PROGRESS NOTES
Physical Therapy    Physical Therapy Treatment    Patient Name: Savi Govea  MRN: [id number]  Today's Date: 10/16/2023      Insurance    Insurance reviewed   Visit number: 11  Approved number of visits: 12   80/20  30 visits       Diagnosis:  R knee OA, s/p R TKA 8/4/2023 by Dr. Isaiah Shaw       Assessment/Plan   PT Assessment  PT Assessment Results: Decreased strength, Decreased range of motion, Decreased endurance, Decreased mobility, Pain  Rehab Prognosis: Good  Evaluation/Treatment Tolerance: Patient limited by fatigue, Patient tolerated treatment well  PT Plan  Inpatient/Swing Bed or Outpatient: Outpatient  OP PT Plan  PT Plan: Skilled PT  Rehab Potential: Good  Plan of Care Agreement: Patient  Therapeutic exercise performed in order to improve R knee strength/ROM/mobility.  Patient requires increased tactile/verbal cues with exercise in order to reduce R knee stress/strain during the particular exercise performed.  Patient challenged with exercises performed in clinic secondary to R knee fatigue.     Plan:  progress as tolerated.    General Visit Information:   PT  Visit  PT Received On: 10/16/23  Response to Previous Treatment: Other (Comment) (Patient reports that her R knee is quite achy today)  General  General Comment: Dr. Isaiah Shaw  Subjective   Precautions:  Precautions  STEADI Fall Risk Score (The score of 4 or more indicates an increased risk of falling): 2  LE Weight Bearing Status: Weight Bearing as Tolerated  Braces Applied: none  Precautions Comment: s/p R TKA 8/4/2023    Objective   Pain:  Pain Assessment  Pain Score: 2  Pain Type: Chronic pain  Pain Location: Knee  Pain Orientation: Right  R knee flexion AROM 116 degrees    Treatments:  Therapeutic Exercise  Therapeutic Exercise Performed: Yes  Therapeutic Exercise Activity 1: Bike x 7 minutes  Therapeutic Exercise Activity 2: Lunge stretch at steps, x 30 reps  Therapeutic Exercise Activity 3: Mini-Squats x 30 reps  Therapeutic Exercise  Activity 4: Forward step-ups 8 inch step, x 30 reps  Therapeutic Exercise Activity 5: Lateral Step-ups, 6 inch step, x 30 reps  Therapeutic Exercise Activity 6: Cybex Leg press 2 plates, 3 sets, 10 reps  Therapeutic Exercise Activity 7: Cable column TKE's 4 plates, 3 sets, 10 reps  Therapeutic Exercise Activity 8: supine bridges x 30 reps  Therapeutic Exercise Activity 9: supine heel slides x 30 reps    Treatment    Time in clinic started at  2:30pm  Time in clinic ended at  3:15pm  Total time in clinic is . 45 minutes  Total timed code time is  38 minutes    Treatment Performed Today:.   Therapeutic Exercise x 3 units

## 2023-10-18 ENCOUNTER — TREATMENT (OUTPATIENT)
Dept: PHYSICAL THERAPY | Facility: CLINIC | Age: 63
End: 2023-10-18
Payer: COMMERCIAL

## 2023-10-18 DIAGNOSIS — M17.11 PRIMARY OSTEOARTHRITIS OF RIGHT KNEE: Primary | ICD-10-CM

## 2023-10-18 PROCEDURE — 97110 THERAPEUTIC EXERCISES: CPT | Mod: GP | Performed by: PHYSICAL THERAPIST

## 2023-10-18 ASSESSMENT — PAIN SCALES - GENERAL: PAINLEVEL_OUTOF10: 0 - NO PAIN

## 2023-10-18 NOTE — PROGRESS NOTES
Physical Therapy    Physical Therapy Treatment    Patient Name: Savi Govea  MRN: [id number]  Today's Date: 10/18/2023     Insurance    Insurance reviewed   Visit number: 12  Approved number of visits: 12   80/20  30 visits       Diagnosis:  R knee OA, s/p R TKA 8/4/2023 by Dr. Isaiah Shaw    Time Calculation  Start Time: 1400  Stop Time: 1425  Time Calculation (min): 25 min    Goals from IE:    By the end of 12 visits patient will be able to do the following with < 1/10 R knee pain:    HEP: Patient will consistently perform her home exercise program for 20-30 minute sessions, 1-2x/day, 3-4 days/week independently by the end of 12 visits.  MET    Basic ADL's: Patient will perform bADL’s/instrumental ADL’s for 30 minutes moving between various closed kinetic chain postures.  MET    ROM and Strength: Patient will demonstrate 5/5 R knee strength in all planes and 0-120 degrees R knee AROM in all planes to improve their ability to lift, stand, ambulate and perform basic ADL's.  Nearly met    Stair Negotiation: Patient will be able to ambulate up/down stairs for 1-2 flights at a time.  MET    Gait/Locomotion: Patient will be able to ambulate for 30-60 minutes at a time.  Minimal to no gait deviation across level ground/stairs.  MET    Work: Patient will return to work and perform normal work duties.  Retuning to work on 10/27/2023    Sleep: Patient will sleep thru the night 4/7 nights/week.  Progressing    Participation restrictions: Increase LEFS to > or = to 65/80 for increased functional ability.   Progressing    Assessment/Plan   PT Assessment  PT Assessment Results: Decreased strength  Rehab Prognosis: Good  Evaluation/Treatment Tolerance: Patient tolerated treatment well  PT Plan  Inpatient/Swing Bed or Outpatient: Outpatient  OP PT Plan  PT Plan: No Additional PT interventions required at this time  Duration: Patient discharged  Rehab Potential: Good  Plan of Care Agreement: Patient  Patient comes into clinic  today for PT Re-Evaluation secondary to being 10.5 weeks s/p R TKA 8/4/2023.  Patient progressing very well with regards to her R knee rehab after TKA.  Patient is independent with her HEP, has met most of her PT goals and will be discharged from PT at this time.    Plan:  patient discharged from formal PT.    General Visit Information:   PT  Visit  PT Received On: 10/18/23  General  General Comment: Dr. Isaiah Shaw    Subjective   Patient reports that her R knee feels 90% improved with regards to performing basic ADL's since surgery.  Patient to follow up with MD next week.  Patient reports having occasional issues with the following secondary to R knee fatigue:  Shopping/ Getting Groceries- walking > 45 minutes   Walking- walking > 45 minutes  Standing- if > 45 minutes  Work- plans to return to work on 10/27/2023    Patient reports having no R knee issues with the following activities below.  basic ADL's/instrumental ADL's including  Sleeping  Dressing  Bathing or Showering  Washing dishes  Cooking  Driving  Cleaning / Maintaining home  Vacuuming  Dusting  Mopping  Laundry  Sitting  Stairs  Squatting  Lunging    Precautions:  Precautions  STEADI Fall Risk Score (The score of 4 or more indicates an increased risk of falling): 2  LE Weight Bearing Status: Weight Bearing as Tolerated  Braces Applied: none  Precautions Comment: s/p R TKA 8/4/2023    Objective   Pain:  Pain Assessment  Pain Score: 0 - No pain  Pain Type: Chronic pain  Pain Location: Knee  Pain Orientation: Right    Restrictions as per MD's Protocol if surgical:  follow TKA protcol    Weightbearing Status:  WBAT R LE    Skin:  R knee surgical incision healed and closed.    Palpation:  no point tenderness over R knee    Sensation:  Patient denies numbness/tingling of bilateral lower extremities.    Gait:  Normal gait R LE    ROM:  AROM  R knee flexion 114 degrees  R knee flexion PROM 118 degrees  R knee extension AROM 0 degrees    Strength:    R knee 5/5  all planes  R hip flexion/ankle DF 5/5 each    Treatments:  Therapeutic Exercise  Therapeutic Exercise Performed: Yes  Therapeutic Exercise Activity 1: Bike x 7 minutes  Therapeutic Exercise Activity 2: See Re-Evaluation    Outcome Measures:  Other Measures  Lower Extremity Funtional Score (LEFS): 52/80    Treatment  Time in clinic started at  2pm  Time in clinic ended at  2:25pm  Total time in clinic is . 25 minutes  Total timed code time is  23 minutes    Treatment Performed Today:.   Therapeutic Exercise x 2 units (including Re-Evaluation)

## 2023-10-23 ENCOUNTER — APPOINTMENT (OUTPATIENT)
Dept: PHYSICAL THERAPY | Facility: CLINIC | Age: 63
End: 2023-10-23
Payer: COMMERCIAL

## 2023-10-26 ENCOUNTER — OFFICE VISIT (OUTPATIENT)
Dept: ORTHOPEDIC SURGERY | Facility: CLINIC | Age: 63
End: 2023-10-26
Payer: COMMERCIAL

## 2023-10-26 DIAGNOSIS — Z96.651 STATUS POST RIGHT KNEE REPLACEMENT: Primary | ICD-10-CM

## 2023-10-26 PROBLEM — M17.12 UNILATERAL PRIMARY OSTEOARTHRITIS, LEFT KNEE: Status: ACTIVE | Noted: 2023-10-26

## 2023-10-26 PROCEDURE — 99213 OFFICE O/P EST LOW 20 MIN: CPT | Mod: 24 | Performed by: ORTHOPAEDIC SURGERY

## 2023-10-26 PROCEDURE — 99213 OFFICE O/P EST LOW 20 MIN: CPT | Performed by: ORTHOPAEDIC SURGERY

## 2023-10-26 PROCEDURE — 1036F TOBACCO NON-USER: CPT | Performed by: ORTHOPAEDIC SURGERY

## 2023-10-26 PROCEDURE — 4010F ACE/ARB THERAPY RXD/TAKEN: CPT | Performed by: ORTHOPAEDIC SURGERY

## 2023-10-26 RX ORDER — AMOXICILLIN 500 MG/1
2000 CAPSULE ORAL
Qty: 4 CAPSULE | Refills: 1 | Status: SHIPPED | OUTPATIENT
Start: 2023-10-26 | End: 2023-10-26

## 2023-10-26 NOTE — PROGRESS NOTES
History of Present Illness:  Patient returns today endorsing mild pain.  Patient notes improving functional status.    She is scheduled for surgery on her left knee in January states the pain is still persistent medially.  She also notes some leg length discrepancy with the left side being shorter    Physical Examination:  Right knee  Well healed incision   ROM: 0-115+  No laxity to varus / valgus stress  + Plantar/dorsiflexion  Negative Nicolas test    Left knee  Varus deformity mild tenderness over medial joint line, decreased leg length approximately 1 to 2 cm    Assessment:  Patient status post right total knee arthroplasty, doing well scheduled for left total knee arthroplasty this winter    Plan:  Discussed analgesics.  Reviewed range of motion, strength goals.  Discussed activities to avoid  Dental prophylaxis discussed - prescription sent today    We reviewed her CT of abdomen pelvis there is some degenerative changes lower lumbar spine and there is some pelvic tilt we discussed that surgery on the left side will help somewhat with the leg length but some is proximal as well discussed possible lift for the shoes.

## 2023-12-04 ENCOUNTER — APPOINTMENT (OUTPATIENT)
Dept: CARDIOLOGY | Facility: HOSPITAL | Age: 63
End: 2023-12-04
Payer: COMMERCIAL

## 2023-12-21 ENCOUNTER — PRE-ADMISSION TESTING (OUTPATIENT)
Dept: PREADMISSION TESTING | Facility: HOSPITAL | Age: 63
End: 2023-12-21
Payer: COMMERCIAL

## 2023-12-21 ENCOUNTER — HOSPITAL ENCOUNTER (OUTPATIENT)
Dept: CARDIOLOGY | Facility: HOSPITAL | Age: 63
Discharge: HOME | End: 2023-12-21
Payer: COMMERCIAL

## 2023-12-21 VITALS
BODY MASS INDEX: 43.34 KG/M2 | SYSTOLIC BLOOD PRESSURE: 150 MMHG | WEIGHT: 260.14 LBS | RESPIRATION RATE: 18 BRPM | OXYGEN SATURATION: 95 % | DIASTOLIC BLOOD PRESSURE: 78 MMHG | HEIGHT: 65 IN | HEART RATE: 75 BPM

## 2023-12-21 DIAGNOSIS — M17.12 UNILATERAL PRIMARY OSTEOARTHRITIS, LEFT KNEE: ICD-10-CM

## 2023-12-21 LAB
ALBUMIN SERPL BCP-MCNC: 4.1 G/DL (ref 3.4–5)
ALP SERPL-CCNC: 87 U/L (ref 33–136)
ALT SERPL W P-5'-P-CCNC: 15 U/L (ref 7–45)
ANION GAP SERPL CALC-SCNC: 14 MMOL/L (ref 10–20)
APPEARANCE UR: ABNORMAL
AST SERPL W P-5'-P-CCNC: 15 U/L (ref 9–39)
BACTERIA #/AREA URNS AUTO: ABNORMAL /HPF
BASOPHILS # BLD AUTO: 0.07 X10*3/UL (ref 0–0.1)
BASOPHILS NFR BLD AUTO: 0.6 %
BILIRUB SERPL-MCNC: 0.4 MG/DL (ref 0–1.2)
BILIRUB UR STRIP.AUTO-MCNC: NEGATIVE MG/DL
BUN SERPL-MCNC: 9 MG/DL (ref 6–23)
CALCIUM SERPL-MCNC: 9.4 MG/DL (ref 8.6–10.3)
CHLORIDE SERPL-SCNC: 103 MMOL/L (ref 98–107)
CO2 SERPL-SCNC: 27 MMOL/L (ref 21–32)
COLOR UR: YELLOW
CREAT SERPL-MCNC: 0.74 MG/DL (ref 0.5–1.05)
EOSINOPHIL # BLD AUTO: 0.26 X10*3/UL (ref 0–0.7)
EOSINOPHIL NFR BLD AUTO: 2.3 %
ERYTHROCYTE [DISTWIDTH] IN BLOOD BY AUTOMATED COUNT: 14.2 % (ref 11.5–14.5)
GFR SERPL CREATININE-BSD FRML MDRD: >90 ML/MIN/1.73M*2
GLUCOSE SERPL-MCNC: 150 MG/DL (ref 74–99)
GLUCOSE UR STRIP.AUTO-MCNC: ABNORMAL MG/DL
HCT VFR BLD AUTO: 44.8 % (ref 36–46)
HGB BLD-MCNC: 13.8 G/DL (ref 12–16)
IMM GRANULOCYTES # BLD AUTO: 0.03 X10*3/UL (ref 0–0.7)
IMM GRANULOCYTES NFR BLD AUTO: 0.3 % (ref 0–0.9)
INR PPP: 1.1 (ref 0.9–1.1)
KETONES UR STRIP.AUTO-MCNC: ABNORMAL MG/DL
LEUKOCYTE ESTERASE UR QL STRIP.AUTO: ABNORMAL
LYMPHOCYTES # BLD AUTO: 1.37 X10*3/UL (ref 1.2–4.8)
LYMPHOCYTES NFR BLD AUTO: 12 %
MCH RBC QN AUTO: 25.2 PG (ref 26–34)
MCHC RBC AUTO-ENTMCNC: 30.8 G/DL (ref 32–36)
MCV RBC AUTO: 82 FL (ref 80–100)
MONOCYTES # BLD AUTO: 0.66 X10*3/UL (ref 0.1–1)
MONOCYTES NFR BLD AUTO: 5.8 %
MUCOUS THREADS #/AREA URNS AUTO: ABNORMAL /LPF
NEUTROPHILS # BLD AUTO: 9.02 X10*3/UL (ref 1.2–7.7)
NEUTROPHILS NFR BLD AUTO: 79 %
NITRITE UR QL STRIP.AUTO: NEGATIVE
NRBC BLD-RTO: 0 /100 WBCS (ref 0–0)
PH UR STRIP.AUTO: 5 [PH]
PLATELET # BLD AUTO: 353 X10*3/UL (ref 150–450)
POTASSIUM SERPL-SCNC: 4.1 MMOL/L (ref 3.5–5.3)
PROT SERPL-MCNC: 7.2 G/DL (ref 6.4–8.2)
PROT UR STRIP.AUTO-MCNC: ABNORMAL MG/DL
PROTHROMBIN TIME: 11.9 SECONDS (ref 9.8–12.8)
RBC # BLD AUTO: 5.47 X10*6/UL (ref 4–5.2)
RBC # UR STRIP.AUTO: ABNORMAL /UL
RBC #/AREA URNS AUTO: >20 /HPF
SODIUM SERPL-SCNC: 140 MMOL/L (ref 136–145)
SP GR UR STRIP.AUTO: 1.03
UROBILINOGEN UR STRIP.AUTO-MCNC: <2 MG/DL
WBC # BLD AUTO: 11.4 X10*3/UL (ref 4.4–11.3)
WBC #/AREA URNS AUTO: ABNORMAL /HPF

## 2023-12-21 PROCEDURE — 36415 COLL VENOUS BLD VENIPUNCTURE: CPT

## 2023-12-21 PROCEDURE — 93005 ELECTROCARDIOGRAM TRACING: CPT

## 2023-12-21 PROCEDURE — 93010 ELECTROCARDIOGRAM REPORT: CPT | Performed by: INTERNAL MEDICINE

## 2023-12-21 PROCEDURE — 80053 COMPREHEN METABOLIC PANEL: CPT

## 2023-12-21 PROCEDURE — 81001 URINALYSIS AUTO W/SCOPE: CPT

## 2023-12-21 PROCEDURE — 83036 HEMOGLOBIN GLYCOSYLATED A1C: CPT | Mod: ELYLAB

## 2023-12-21 PROCEDURE — 85610 PROTHROMBIN TIME: CPT

## 2023-12-21 PROCEDURE — 85025 COMPLETE CBC W/AUTO DIFF WBC: CPT

## 2023-12-21 PROCEDURE — 87086 URINE CULTURE/COLONY COUNT: CPT | Mod: ELYLAB

## 2023-12-21 PROCEDURE — 87081 CULTURE SCREEN ONLY: CPT | Mod: ELYLAB

## 2023-12-21 RX ORDER — IBUPROFEN 100 MG/5ML
1000 SUSPENSION, ORAL (FINAL DOSE FORM) ORAL DAILY
COMMUNITY

## 2023-12-21 RX ORDER — NICOTINE POLACRILEX 2 MG
1 GUM BUCCAL 2 TIMES DAILY
COMMUNITY

## 2023-12-21 RX ORDER — METFORMIN HYDROCHLORIDE 1000 MG/1
1000 TABLET ORAL
COMMUNITY

## 2023-12-21 RX ORDER — CALCIUM CARBONATE/VITAMIN D3 500-10/5ML
1 LIQUID (ML) ORAL 2 TIMES DAILY
COMMUNITY

## 2023-12-21 RX ORDER — GLIMEPIRIDE 4 MG/1
4 TABLET ORAL 2 TIMES DAILY
COMMUNITY

## 2023-12-21 RX ORDER — BUTYROSPERMUM PARKII(SHEA BUTTER), SIMMONDSIA CHINENSIS (JOJOBA) SEED OIL, ALOE BARBADENSIS LEAF EXTRACT .01; 1; 3.5 G/100G; G/100G; G/100G
250 LIQUID TOPICAL DAILY
COMMUNITY

## 2023-12-21 NOTE — PREPROCEDURE INSTRUCTIONS
Patient here for PAT visit; labs, EKG, and MRSA swab obtained. CHG skin wipes and joint instruction book given to and reviewed with patient. Written pre-op instructions reviewed with patient. Pt to watch joint video at home and has walker available. Patient aware to take beta blocker - metoprolol the morning of the procedure.

## 2023-12-22 LAB
ATRIAL RATE: 67 BPM
BACTERIA UR CULT: NO GROWTH
EST. AVERAGE GLUCOSE BLD GHB EST-MCNC: 169 MG/DL
HBA1C MFR BLD: 7.5 %
HOLD SPECIMEN: NORMAL
P AXIS: 28 DEGREES
P OFFSET: 191 MS
P ONSET: 124 MS
PR INTERVAL: 180 MS
Q ONSET: 214 MS
QRS COUNT: 11 BEATS
QRS DURATION: 86 MS
QT INTERVAL: 404 MS
QTC CALCULATION(BAZETT): 426 MS
QTC FREDERICIA: 419 MS
R AXIS: -20 DEGREES
T AXIS: 1 DEGREES
T OFFSET: 416 MS
VENTRICULAR RATE: 67 BPM

## 2023-12-23 LAB — STAPHYLOCOCCUS SPEC CULT: NORMAL

## 2023-12-28 ENCOUNTER — TELEPHONE (OUTPATIENT)
Dept: ORTHOPEDIC SURGERY | Facility: CLINIC | Age: 63
End: 2023-12-28
Payer: COMMERCIAL

## 2023-12-28 NOTE — TELEPHONE ENCOUNTER
12/28/23  Spoke w/ pt re availability of a wheeled walker for post-op use.  She does have one and will take it to the hospital on the day of sx.

## 2024-01-03 DIAGNOSIS — Z96.619 S/P REVERSE TOTAL SHOULDER ARTHROPLASTY, UNSPECIFIED LATERALITY: Primary | ICD-10-CM

## 2024-01-03 RX ORDER — TRANEXAMIC ACID 650 MG/1
1950 TABLET ORAL ONCE
Status: CANCELLED | OUTPATIENT
Start: 2024-01-03 | End: 2024-01-03

## 2024-01-03 RX ORDER — ACETAMINOPHEN 325 MG/1
650 TABLET ORAL ONCE
Status: CANCELLED | OUTPATIENT
Start: 2024-01-03 | End: 2024-01-03

## 2024-01-03 RX ORDER — SODIUM CHLORIDE, SODIUM LACTATE, POTASSIUM CHLORIDE, CALCIUM CHLORIDE 600; 310; 30; 20 MG/100ML; MG/100ML; MG/100ML; MG/100ML
100 INJECTION, SOLUTION INTRAVENOUS CONTINUOUS
Status: CANCELLED | OUTPATIENT
Start: 2024-01-03

## 2024-01-04 ENCOUNTER — ANESTHESIA EVENT (OUTPATIENT)
Dept: OPERATING ROOM | Facility: HOSPITAL | Age: 64
End: 2024-01-04
Payer: COMMERCIAL

## 2024-01-04 SDOH — HEALTH STABILITY: MENTAL HEALTH: CURRENT SMOKER: 0

## 2024-01-04 NOTE — ANESTHESIA PREPROCEDURE EVALUATION
Savi Govea is a 63 y.o. female here for:    OPEN TOTAL KNEE ARTHROPLASTY BLOCK PER TKA  PROTOCOL, WING  With Isaiah Shaw MD  Pre-Op Diagnosis Codes:     * Unilateral primary osteoarthritis, left knee [M17.12]    Relevant Problems   Cardiovascular   (+) Essential hypertension   (+) Hyperlipidemia      Endocrine   (+) Diabetes mellitus, type 2 (CMS/HCC)   (+) Obesity      Neuro/Psych   (+) Acute lumbar radiculopathy   (+) Cervical neuritis   (+) Left cervical radiculopathy      Musculoskeletal   (+) Cervical spondylosis with radiculopathy   (+) Osteoarthritis of left hip   (+) Primary osteoarthritis of right knee   (+) Unilateral primary osteoarthritis, left knee       Lab Results   Component Value Date    HGB 13.8 2023    HCT 44.8 2023    WBC 11.4 (H) 2023     2023     2023    K 4.1 2023     2023    CREATININE 0.74 2023    BUN 9 2023       Social History     Tobacco Use   Smoking Status Former    Packs/day: 0.50    Years: 10.00    Additional pack years: 0.00    Total pack years: 5.00    Types: Cigarettes    Quit date:     Years since quittin.0   Smokeless Tobacco Never       Allergies   Allergen Reactions    Aspirin GI Upset     Jittery feeling    Cefditoren Unknown    Cetirizine Other     Leg cramps    Fexofenadine Other     Leg cramps    Phenylephrine-Guaifenesin Other     Patient states that she gets very hyper and cannot sleep.    Pseudoephedrine-Guaifenesin Unknown    Salicylates Other    Spectracef [Cefditoren Pivoxil] Unknown       Current Outpatient Medications   Medication Instructions    alpha lipoic acid 300 mg capsule 1 capsule, oral, 2 times daily    ammonium lactate (Lac-Hydrin) 12 % lotion Topical, Daily PRN, As directed     APPLE CIDER VINEGAR ORAL 2 capsules, oral, 2 times daily    ascorbic acid (VITAMIN C) 1,000 mg, oral, Daily    calcium citrate-vitamin D3 200 mg-3.125 mcg (125 unit) tablet 1 tablet,  oral, Daily    CHROMIUM PICOLINATE ORAL 1 tablet, oral, Daily    cinnamon (CINNAMON) 500 mg, oral, 2 times daily    fluticasone (Flonase) 50 mcg/actuation nasal spray 1 spray, Each Nostril, Daily PRN    glimepiride (AMARYL) 4 mg, oral, 2 times daily    HYDROcodone-acetaminophen (Norco) 5-325 mg tablet 1 tablet, oral, Every 8 hours PRN    irbesartan (AVAPRO) 300 mg, oral, Daily    Januvia 100 mg tablet Take 1 tablet (100 mg) by mouth once daily.    Jardiance 25 mg, oral, Daily    loratadine (CLARITIN) 10 mg, oral, Daily    magnesium oxide 400 mg magnesium capsule 1 capsule, oral, 2 times daily    meloxicam (MOBIC) 15 mg, oral, Daily    metFORMIN (GLUCOPHAGE) 1,000 mg, oral, 2 times daily with meals    metoprolol succinate XL (KAPSPARGO SPRINKLE) 100 mg, oral, Daily    multivitamin tablet 1 tablet, oral, Daily RT    naloxone (Narcan) 4 mg/0.1 mL nasal spray INSTILL 1 SPRAY IN ONE NOSTRIL AS NEEDED FOR ACCIDENTAL OPIOID OVERDOSE. REPEAT WITH SECOND DOSE IN 5 MINUTES IF NO RESPONSE    OneTouch Delica Plus Lancet 30 gauge misc     OneTouch Verio test strips strip     pomegranate fruit extract 250 mg capsule 1 capsule, oral, 2 times daily    potassium citrate 99 mg, oral, Daily RT    saccharomyces boulardii (FLORASTOR) 250 mg, oral, Daily    Soma 350 mg, oral, Daily    zinc gluconate 50 mg tablet 1 tablet, oral, Daily       Past Surgical History:   Procedure Laterality Date    CARPAL TUNNEL RELEASE      DILATION AND CURETTAGE OF UTERUS      ENDOMETRIAL ABLATION      HAND SURGERY  03/18/2015    Hand Surgery                                                                                                                                                          JOINT REPLACEMENT Right     KNEE ARTHROSCOPY W/ DEBRIDEMENT  03/18/2015    Arthroscopy Knee Right    LIPOMA RESECTION Left     left arm    TONSILLECTOMY  03/18/2015    Tonsillectomy    TUBAL LIGATION  03/18/2015    Tubal Ligation       Family History   Problem Relation  Name Age of Onset    Uterine cancer Mother      Hypertension Father      Thyroid cancer Sister      Diabetes Brother      Graves' disease Brother      Multiple sclerosis Brother      Diabetes Maternal Grandmother      Skin cancer Maternal Grandmother      Hypertension Maternal Grandfather      Diabetes Paternal Grandfather      Hypertension Paternal Grandfather         NPO Details:  No data recorded    Physical Exam    Airway  Mallampati: II  TM distance: >3 FB  Neck ROM: full     Cardiovascular - normal exam     Dental - normal exam     Pulmonary - normal exam     Abdominal            Anesthesia Plan    ASA 3     MAC, regional and spinal   (Successful spinal with R TKA at L3-L4 midline)  The patient is not a current smoker.    intravenous induction   Postoperative administration of opioids is intended.  Anesthetic plan and risks discussed with patient.

## 2024-01-05 ENCOUNTER — HOSPITAL ENCOUNTER (OUTPATIENT)
Facility: HOSPITAL | Age: 64
LOS: 1 days | Discharge: HOME | End: 2024-01-06
Attending: ORTHOPAEDIC SURGERY | Admitting: ORTHOPAEDIC SURGERY
Payer: COMMERCIAL

## 2024-01-05 ENCOUNTER — ANESTHESIA (OUTPATIENT)
Dept: OPERATING ROOM | Facility: HOSPITAL | Age: 64
End: 2024-01-05
Payer: COMMERCIAL

## 2024-01-05 DIAGNOSIS — Z96.651 STATUS POST RIGHT KNEE REPLACEMENT: ICD-10-CM

## 2024-01-05 DIAGNOSIS — M17.12 UNILATERAL PRIMARY OSTEOARTHRITIS, LEFT KNEE: ICD-10-CM

## 2024-01-05 DIAGNOSIS — Z96.619 S/P REVERSE TOTAL SHOULDER ARTHROPLASTY, UNSPECIFIED LATERALITY: ICD-10-CM

## 2024-01-05 DIAGNOSIS — M17.9 OSTEOARTHRITIS, KNEE: Primary | ICD-10-CM

## 2024-01-05 LAB
GLUCOSE BLD MANUAL STRIP-MCNC: 192 MG/DL (ref 74–99)
GLUCOSE BLD MANUAL STRIP-MCNC: 207 MG/DL (ref 74–99)
GLUCOSE BLD MANUAL STRIP-MCNC: 252 MG/DL (ref 74–99)

## 2024-01-05 PROCEDURE — 2500000004 HC RX 250 GENERAL PHARMACY W/ HCPCS (ALT 636 FOR OP/ED): Performed by: STUDENT IN AN ORGANIZED HEALTH CARE EDUCATION/TRAINING PROGRAM

## 2024-01-05 PROCEDURE — 7100000011 HC EXTENDED STAY RECOVERY HOURLY - NURSING UNIT

## 2024-01-05 PROCEDURE — 3600000018 HC OR TIME - INITIAL BASE CHARGE - PROCEDURE LEVEL SIX: Performed by: ORTHOPAEDIC SURGERY

## 2024-01-05 PROCEDURE — 2500000004 HC RX 250 GENERAL PHARMACY W/ HCPCS (ALT 636 FOR OP/ED): Performed by: REGISTERED NURSE

## 2024-01-05 PROCEDURE — 2720000007 HC OR 272 NO HCPCS: Performed by: ORTHOPAEDIC SURGERY

## 2024-01-05 PROCEDURE — 3700000001 HC GENERAL ANESTHESIA TIME - INITIAL BASE CHARGE: Performed by: ORTHOPAEDIC SURGERY

## 2024-01-05 PROCEDURE — 82947 ASSAY GLUCOSE BLOOD QUANT: CPT

## 2024-01-05 PROCEDURE — A4217 STERILE WATER/SALINE, 500 ML: HCPCS | Performed by: ORTHOPAEDIC SURGERY

## 2024-01-05 PROCEDURE — 99221 1ST HOSP IP/OBS SF/LOW 40: CPT | Performed by: REGISTERED NURSE

## 2024-01-05 PROCEDURE — C1776 JOINT DEVICE (IMPLANTABLE): HCPCS | Performed by: ORTHOPAEDIC SURGERY

## 2024-01-05 PROCEDURE — 2780000003 HC OR 278 NO HCPCS: Performed by: ORTHOPAEDIC SURGERY

## 2024-01-05 PROCEDURE — 27447 TOTAL KNEE ARTHROPLASTY: CPT | Performed by: ORTHOPAEDIC SURGERY

## 2024-01-05 PROCEDURE — 2500000001 HC RX 250 WO HCPCS SELF ADMINISTERED DRUGS (ALT 637 FOR MEDICARE OP): Performed by: ORTHOPAEDIC SURGERY

## 2024-01-05 PROCEDURE — 2500000002 HC RX 250 W HCPCS SELF ADMINISTERED DRUGS (ALT 637 FOR MEDICARE OP, ALT 636 FOR OP/ED): Performed by: REGISTERED NURSE

## 2024-01-05 PROCEDURE — 2500000005 HC RX 250 GENERAL PHARMACY W/O HCPCS: Performed by: ORTHOPAEDIC SURGERY

## 2024-01-05 PROCEDURE — 3700000002 HC GENERAL ANESTHESIA TIME - EACH INCREMENTAL 1 MINUTE: Performed by: ORTHOPAEDIC SURGERY

## 2024-01-05 PROCEDURE — C1713 ANCHOR/SCREW BN/BN,TIS/BN: HCPCS | Performed by: ORTHOPAEDIC SURGERY

## 2024-01-05 PROCEDURE — 7100000001 HC RECOVERY ROOM TIME - INITIAL BASE CHARGE: Performed by: ORTHOPAEDIC SURGERY

## 2024-01-05 PROCEDURE — 2500000001 HC RX 250 WO HCPCS SELF ADMINISTERED DRUGS (ALT 637 FOR MEDICARE OP): Performed by: REGISTERED NURSE

## 2024-01-05 PROCEDURE — 2500000004 HC RX 250 GENERAL PHARMACY W/ HCPCS (ALT 636 FOR OP/ED): Performed by: ORTHOPAEDIC SURGERY

## 2024-01-05 PROCEDURE — 3600000017 HC OR TIME - EACH INCREMENTAL 1 MINUTE - PROCEDURE LEVEL SIX: Performed by: ORTHOPAEDIC SURGERY

## 2024-01-05 PROCEDURE — 27447 TOTAL KNEE ARTHROPLASTY: CPT | Performed by: STUDENT IN AN ORGANIZED HEALTH CARE EDUCATION/TRAINING PROGRAM

## 2024-01-05 PROCEDURE — 7100000002 HC RECOVERY ROOM TIME - EACH INCREMENTAL 1 MINUTE: Performed by: ORTHOPAEDIC SURGERY

## 2024-01-05 DEVICE — CEMENT, BONE, BIOMET R, 1X40: Type: IMPLANTABLE DEVICE | Site: KNEE | Status: FUNCTIONAL

## 2024-01-05 DEVICE — CRUCIATE RETAINING FEMORAL
Type: IMPLANTABLE DEVICE | Site: KNEE | Status: FUNCTIONAL
Brand: TRIATHLON

## 2024-01-05 DEVICE — UNIVERSAL TIBIAL BASEPLATE
Type: IMPLANTABLE DEVICE | Site: KNEE | Status: FUNCTIONAL
Brand: TRIATHLON

## 2024-01-05 DEVICE — PATELLA
Type: IMPLANTABLE DEVICE | Site: KNEE | Status: FUNCTIONAL
Brand: TRIATHLON

## 2024-01-05 DEVICE — TIBIAL BEARING INSERT - CS
Type: IMPLANTABLE DEVICE | Site: KNEE | Status: FUNCTIONAL
Brand: TRIATHLON

## 2024-01-05 RX ORDER — TRANEXAMIC ACID 650 MG/1
1950 TABLET ORAL ONCE
Status: COMPLETED | OUTPATIENT
Start: 2024-01-05 | End: 2024-01-05

## 2024-01-05 RX ORDER — SODIUM CHLORIDE, SODIUM LACTATE, POTASSIUM CHLORIDE, CALCIUM CHLORIDE 600; 310; 30; 20 MG/100ML; MG/100ML; MG/100ML; MG/100ML
100 INJECTION, SOLUTION INTRAVENOUS CONTINUOUS
Status: DISCONTINUED | OUTPATIENT
Start: 2024-01-05 | End: 2024-01-05

## 2024-01-05 RX ORDER — NALOXONE HYDROCHLORIDE 1 MG/ML
0.2 INJECTION INTRAMUSCULAR; INTRAVENOUS; SUBCUTANEOUS EVERY 5 MIN PRN
Status: DISCONTINUED | OUTPATIENT
Start: 2024-01-05 | End: 2024-01-06 | Stop reason: HOSPADM

## 2024-01-05 RX ORDER — OXYCODONE HYDROCHLORIDE 5 MG/1
5 TABLET ORAL EVERY 4 HOURS PRN
Status: DISCONTINUED | OUTPATIENT
Start: 2024-01-05 | End: 2024-01-05

## 2024-01-05 RX ORDER — MORPHINE SULFATE 2 MG/ML
2 INJECTION, SOLUTION INTRAMUSCULAR; INTRAVENOUS EVERY 2 HOUR PRN
Status: DISCONTINUED | OUTPATIENT
Start: 2024-01-05 | End: 2024-01-06 | Stop reason: HOSPADM

## 2024-01-05 RX ORDER — OXYCODONE HYDROCHLORIDE 5 MG/1
10 TABLET ORAL EVERY 4 HOURS PRN
Status: DISCONTINUED | OUTPATIENT
Start: 2024-01-05 | End: 2024-01-05

## 2024-01-05 RX ORDER — ROPIVACAINE/EPI/CLONIDINE/KET 2.46-0.005
SYRINGE (ML) INJECTION AS NEEDED
Status: DISCONTINUED | OUTPATIENT
Start: 2024-01-05 | End: 2024-01-05 | Stop reason: HOSPADM

## 2024-01-05 RX ORDER — SODIUM CHLORIDE 0.9 G/100ML
IRRIGANT IRRIGATION AS NEEDED
Status: DISCONTINUED | OUTPATIENT
Start: 2024-01-05 | End: 2024-01-05 | Stop reason: HOSPADM

## 2024-01-05 RX ORDER — SODIUM CHLORIDE, SODIUM LACTATE, POTASSIUM CHLORIDE, CALCIUM CHLORIDE 600; 310; 30; 20 MG/100ML; MG/100ML; MG/100ML; MG/100ML
100 INJECTION, SOLUTION INTRAVENOUS CONTINUOUS
Status: DISCONTINUED | OUTPATIENT
Start: 2024-01-05 | End: 2024-01-05 | Stop reason: HOSPADM

## 2024-01-05 RX ORDER — DIPHENHYDRAMINE HCL 12.5MG/5ML
12.5 LIQUID (ML) ORAL EVERY 6 HOURS PRN
Status: DISCONTINUED | OUTPATIENT
Start: 2024-01-05 | End: 2024-01-06 | Stop reason: HOSPADM

## 2024-01-05 RX ORDER — ACETAMINOPHEN 325 MG/1
650 TABLET ORAL EVERY 6 HOURS SCHEDULED
Status: DISCONTINUED | OUTPATIENT
Start: 2024-01-05 | End: 2024-01-06 | Stop reason: HOSPADM

## 2024-01-05 RX ORDER — SODIUM CHLORIDE, SODIUM LACTATE, POTASSIUM CHLORIDE, CALCIUM CHLORIDE 600; 310; 30; 20 MG/100ML; MG/100ML; MG/100ML; MG/100ML
50 INJECTION, SOLUTION INTRAVENOUS CONTINUOUS
Status: DISCONTINUED | OUTPATIENT
Start: 2024-01-05 | End: 2024-01-06 | Stop reason: HOSPADM

## 2024-01-05 RX ORDER — BISACODYL 5 MG
10 TABLET, DELAYED RELEASE (ENTERIC COATED) ORAL DAILY PRN
Status: DISCONTINUED | OUTPATIENT
Start: 2024-01-05 | End: 2024-01-06 | Stop reason: HOSPADM

## 2024-01-05 RX ORDER — ONDANSETRON HYDROCHLORIDE 2 MG/ML
4 INJECTION, SOLUTION INTRAVENOUS ONCE AS NEEDED
Status: DISCONTINUED | OUTPATIENT
Start: 2024-01-05 | End: 2024-01-05 | Stop reason: HOSPADM

## 2024-01-05 RX ORDER — PROPOFOL 10 MG/ML
INJECTION, EMULSION INTRAVENOUS CONTINUOUS PRN
Status: DISCONTINUED | OUTPATIENT
Start: 2024-01-05 | End: 2024-01-05

## 2024-01-05 RX ORDER — WATER 1 ML/ML
IRRIGANT IRRIGATION AS NEEDED
Status: DISCONTINUED | OUTPATIENT
Start: 2024-01-05 | End: 2024-01-05 | Stop reason: HOSPADM

## 2024-01-05 RX ORDER — CYCLOBENZAPRINE HCL 10 MG
10 TABLET ORAL 3 TIMES DAILY PRN
Status: DISCONTINUED | OUTPATIENT
Start: 2024-01-05 | End: 2024-01-06 | Stop reason: HOSPADM

## 2024-01-05 RX ORDER — DEXTROSE 50 % IN WATER (D50W) INTRAVENOUS SYRINGE
25
Status: DISCONTINUED | OUTPATIENT
Start: 2024-01-05 | End: 2024-01-06 | Stop reason: HOSPADM

## 2024-01-05 RX ORDER — ALBUTEROL SULFATE 0.83 MG/ML
2.5 SOLUTION RESPIRATORY (INHALATION) ONCE AS NEEDED
Status: DISCONTINUED | OUTPATIENT
Start: 2024-01-05 | End: 2024-01-05 | Stop reason: HOSPADM

## 2024-01-05 RX ORDER — ONDANSETRON HYDROCHLORIDE 2 MG/ML
4 INJECTION, SOLUTION INTRAVENOUS EVERY 8 HOURS PRN
Status: DISCONTINUED | OUTPATIENT
Start: 2024-01-05 | End: 2024-01-06 | Stop reason: HOSPADM

## 2024-01-05 RX ORDER — LOSARTAN POTASSIUM 100 MG/1
100 TABLET ORAL DAILY
Status: DISCONTINUED | OUTPATIENT
Start: 2024-01-05 | End: 2024-01-06 | Stop reason: HOSPADM

## 2024-01-05 RX ORDER — HYDRALAZINE HYDROCHLORIDE 20 MG/ML
5 INJECTION INTRAMUSCULAR; INTRAVENOUS EVERY 30 MIN PRN
Status: DISCONTINUED | OUTPATIENT
Start: 2024-01-05 | End: 2024-01-05 | Stop reason: HOSPADM

## 2024-01-05 RX ORDER — LABETALOL HYDROCHLORIDE 5 MG/ML
5 INJECTION, SOLUTION INTRAVENOUS ONCE AS NEEDED
Status: DISCONTINUED | OUTPATIENT
Start: 2024-01-05 | End: 2024-01-05 | Stop reason: HOSPADM

## 2024-01-05 RX ORDER — METOPROLOL SUCCINATE 25 MG/1
25 TABLET, EXTENDED RELEASE ORAL DAILY
Status: DISCONTINUED | OUTPATIENT
Start: 2024-01-05 | End: 2024-01-06 | Stop reason: HOSPADM

## 2024-01-05 RX ORDER — METFORMIN HYDROCHLORIDE 500 MG/1
1000 TABLET ORAL
Status: DISCONTINUED | OUTPATIENT
Start: 2024-01-05 | End: 2024-01-06 | Stop reason: HOSPADM

## 2024-01-05 RX ORDER — ROPIVACAINE HYDROCHLORIDE 2 MG/ML
20 INJECTION, SOLUTION EPIDURAL; INFILTRATION; PERINEURAL ONCE
Status: COMPLETED | OUTPATIENT
Start: 2024-01-05 | End: 2024-01-05

## 2024-01-05 RX ORDER — MIDAZOLAM HYDROCHLORIDE 1 MG/ML
INJECTION, SOLUTION INTRAMUSCULAR; INTRAVENOUS AS NEEDED
Status: DISCONTINUED | OUTPATIENT
Start: 2024-01-05 | End: 2024-01-05

## 2024-01-05 RX ORDER — FENTANYL CITRATE 50 UG/ML
INJECTION, SOLUTION INTRAMUSCULAR; INTRAVENOUS AS NEEDED
Status: DISCONTINUED | OUTPATIENT
Start: 2024-01-05 | End: 2024-01-05

## 2024-01-05 RX ORDER — PROPOFOL 10 MG/ML
INJECTION, EMULSION INTRAVENOUS AS NEEDED
Status: DISCONTINUED | OUTPATIENT
Start: 2024-01-05 | End: 2024-01-05

## 2024-01-05 RX ORDER — ACETAMINOPHEN 325 MG/1
650 TABLET ORAL ONCE
Status: COMPLETED | OUTPATIENT
Start: 2024-01-05 | End: 2024-01-05

## 2024-01-05 RX ORDER — OXYCODONE HYDROCHLORIDE 5 MG/1
10 TABLET ORAL EVERY 4 HOURS PRN
Status: DISCONTINUED | OUTPATIENT
Start: 2024-01-05 | End: 2024-01-06 | Stop reason: HOSPADM

## 2024-01-05 RX ORDER — OXYCODONE HYDROCHLORIDE 5 MG/1
5 TABLET ORAL EVERY 4 HOURS PRN
Status: DISCONTINUED | OUTPATIENT
Start: 2024-01-05 | End: 2024-01-06 | Stop reason: HOSPADM

## 2024-01-05 RX ORDER — MEPERIDINE HYDROCHLORIDE 25 MG/ML
12.5 INJECTION INTRAMUSCULAR; INTRAVENOUS; SUBCUTANEOUS EVERY 10 MIN PRN
Status: DISCONTINUED | OUTPATIENT
Start: 2024-01-05 | End: 2024-01-05 | Stop reason: HOSPADM

## 2024-01-05 RX ORDER — DEXTROSE MONOHYDRATE 100 MG/ML
0.3 INJECTION, SOLUTION INTRAVENOUS ONCE AS NEEDED
Status: DISCONTINUED | OUTPATIENT
Start: 2024-01-05 | End: 2024-01-06 | Stop reason: HOSPADM

## 2024-01-05 RX ORDER — INSULIN LISPRO 100 [IU]/ML
0-10 INJECTION, SOLUTION INTRAVENOUS; SUBCUTANEOUS
Status: DISCONTINUED | OUTPATIENT
Start: 2024-01-05 | End: 2024-01-06 | Stop reason: HOSPADM

## 2024-01-05 RX ORDER — FENTANYL CITRATE 50 UG/ML
25 INJECTION, SOLUTION INTRAMUSCULAR; INTRAVENOUS EVERY 5 MIN PRN
Status: DISCONTINUED | OUTPATIENT
Start: 2024-01-05 | End: 2024-01-05 | Stop reason: HOSPADM

## 2024-01-05 RX ORDER — ONDANSETRON HYDROCHLORIDE 2 MG/ML
INJECTION, SOLUTION INTRAVENOUS AS NEEDED
Status: DISCONTINUED | OUTPATIENT
Start: 2024-01-05 | End: 2024-01-05

## 2024-01-05 RX ORDER — ONDANSETRON 4 MG/1
4 TABLET, ORALLY DISINTEGRATING ORAL EVERY 8 HOURS PRN
Status: DISCONTINUED | OUTPATIENT
Start: 2024-01-05 | End: 2024-01-06 | Stop reason: HOSPADM

## 2024-01-05 RX ORDER — LANOLIN ALCOHOL/MO/W.PET/CERES
400 CREAM (GRAM) TOPICAL 2 TIMES DAILY
Status: DISCONTINUED | OUTPATIENT
Start: 2024-01-05 | End: 2024-01-06 | Stop reason: HOSPADM

## 2024-01-05 RX ORDER — GLIMEPIRIDE 2 MG/1
4 TABLET ORAL 2 TIMES DAILY
Status: DISCONTINUED | OUTPATIENT
Start: 2024-01-05 | End: 2024-01-06 | Stop reason: HOSPADM

## 2024-01-05 RX ORDER — DOCUSATE SODIUM 100 MG/1
100 CAPSULE, LIQUID FILLED ORAL 2 TIMES DAILY
Status: DISCONTINUED | OUTPATIENT
Start: 2024-01-05 | End: 2024-01-06 | Stop reason: HOSPADM

## 2024-01-05 RX ORDER — IRBESARTAN 300 MG/1
300 TABLET ORAL DAILY
Status: DISCONTINUED | OUTPATIENT
Start: 2024-01-05 | End: 2024-01-05

## 2024-01-05 RX ORDER — ENOXAPARIN SODIUM 100 MG/ML
30 INJECTION SUBCUTANEOUS EVERY 12 HOURS SCHEDULED
Status: DISCONTINUED | OUTPATIENT
Start: 2024-01-06 | End: 2024-01-06 | Stop reason: HOSPADM

## 2024-01-05 RX ORDER — DIPHENHYDRAMINE HYDROCHLORIDE 50 MG/ML
12.5 INJECTION INTRAMUSCULAR; INTRAVENOUS ONCE AS NEEDED
Status: DISCONTINUED | OUTPATIENT
Start: 2024-01-05 | End: 2024-01-05 | Stop reason: HOSPADM

## 2024-01-05 RX ORDER — TRANEXAMIC ACID 650 MG/1
1950 TABLET ORAL ONCE
Status: COMPLETED | OUTPATIENT
Start: 2024-01-06 | End: 2024-01-06

## 2024-01-05 RX ADMIN — EMPAGLIFLOZIN 25 MG: 25 TABLET, FILM COATED ORAL at 16:35

## 2024-01-05 RX ADMIN — PROPOFOL 100 MG: 10 INJECTION, EMULSION INTRAVENOUS at 12:17

## 2024-01-05 RX ADMIN — MAGNESIUM OXIDE 400 MG (241.3 MG MAGNESIUM) TABLET 400 MG: TABLET at 19:54

## 2024-01-05 RX ADMIN — VANCOMYCIN HYDROCHLORIDE 1.5 G: 1.5 INJECTION, POWDER, LYOPHILIZED, FOR SOLUTION INTRAVENOUS at 22:27

## 2024-01-05 RX ADMIN — PROPOFOL 100 MCG/KG/MIN: 10 INJECTION, EMULSION INTRAVENOUS at 12:18

## 2024-01-05 RX ADMIN — DEXAMETHASONE SODIUM PHOSPHATE: 10 INJECTION, SOLUTION INTRAMUSCULAR; INTRAVENOUS at 12:03

## 2024-01-05 RX ADMIN — TRANEXAMIC ACID 1950 MG: 650 TABLET, FILM COATED ORAL at 19:51

## 2024-01-05 RX ADMIN — DOCUSATE SODIUM 100 MG: 100 CAPSULE, LIQUID FILLED ORAL at 19:53

## 2024-01-05 RX ADMIN — VANCOMYCIN HYDROCHLORIDE 1500 MG: 1.5 INJECTION, POWDER, LYOPHILIZED, FOR SOLUTION INTRAVENOUS at 10:27

## 2024-01-05 RX ADMIN — ROPIVACAINE HYDROCHLORIDE 40 MG: 2 INJECTION, SOLUTION EPIDURAL; INFILTRATION at 12:03

## 2024-01-05 RX ADMIN — SODIUM CHLORIDE, POTASSIUM CHLORIDE, SODIUM LACTATE AND CALCIUM CHLORIDE 50 ML/HR: 600; 310; 30; 20 INJECTION, SOLUTION INTRAVENOUS at 15:24

## 2024-01-05 RX ADMIN — Medication 2 L/MIN: at 15:15

## 2024-01-05 RX ADMIN — TRANEXAMIC ACID 1950 MG: 650 TABLET, FILM COATED ORAL at 09:45

## 2024-01-05 RX ADMIN — ACETAMINOPHEN 650 MG: 325 TABLET ORAL at 18:10

## 2024-01-05 RX ADMIN — INSULIN LISPRO 6 UNITS: 100 INJECTION, SOLUTION INTRAVENOUS; SUBCUTANEOUS at 18:10

## 2024-01-05 RX ADMIN — ACETAMINOPHEN 650 MG: 325 TABLET ORAL at 09:45

## 2024-01-05 RX ADMIN — FENTANYL CITRATE 50 MCG: 50 INJECTION, SOLUTION INTRAMUSCULAR; INTRAVENOUS at 12:10

## 2024-01-05 RX ADMIN — ACETAMINOPHEN 650 MG: 325 TABLET ORAL at 23:59

## 2024-01-05 RX ADMIN — GLIMEPIRIDE 4 MG: 2 TABLET ORAL at 19:54

## 2024-01-05 RX ADMIN — SODIUM CHLORIDE, POTASSIUM CHLORIDE, SODIUM LACTATE AND CALCIUM CHLORIDE 100 ML/HR: 600; 310; 30; 20 INJECTION, SOLUTION INTRAVENOUS at 09:45

## 2024-01-05 RX ADMIN — ONDANSETRON 4 MG: 2 INJECTION, SOLUTION INTRAMUSCULAR; INTRAVENOUS at 12:45

## 2024-01-05 RX ADMIN — FENTANYL CITRATE 50 MCG: 50 INJECTION, SOLUTION INTRAMUSCULAR; INTRAVENOUS at 13:11

## 2024-01-05 RX ADMIN — MIDAZOLAM 2 MG: 1 INJECTION INTRAMUSCULAR; INTRAVENOUS at 11:45

## 2024-01-05 RX ADMIN — POVIDONE-IODINE 1 APPLICATION: 5 SOLUTION TOPICAL at 09:45

## 2024-01-05 SDOH — HEALTH STABILITY: MENTAL HEALTH: EXPERIENCED ANY OF THE FOLLOWING LIFE EVENTS: OTHER (COMMENT)

## 2024-01-05 SDOH — SOCIAL STABILITY: SOCIAL INSECURITY: DO YOU FEEL UNSAFE GOING BACK TO THE PLACE WHERE YOU ARE LIVING?: NO

## 2024-01-05 SDOH — SOCIAL STABILITY: SOCIAL INSECURITY: HAS ANYONE EVER THREATENED TO HURT YOUR FAMILY OR YOUR PETS?: NO

## 2024-01-05 SDOH — SOCIAL STABILITY: SOCIAL INSECURITY: ARE YOU OR HAVE YOU BEEN THREATENED OR ABUSED PHYSICALLY, EMOTIONALLY, OR SEXUALLY BY ANYONE?: NO

## 2024-01-05 SDOH — SOCIAL STABILITY: SOCIAL INSECURITY: DOES ANYONE TRY TO KEEP YOU FROM HAVING/CONTACTING OTHER FRIENDS OR DOING THINGS OUTSIDE YOUR HOME?: NO

## 2024-01-05 SDOH — SOCIAL STABILITY: SOCIAL INSECURITY: HAVE YOU HAD THOUGHTS OF HARMING ANYONE ELSE?: NO

## 2024-01-05 SDOH — SOCIAL STABILITY: SOCIAL INSECURITY: WERE YOU ABLE TO COMPLETE ALL THE BEHAVIORAL HEALTH SCREENINGS?: YES

## 2024-01-05 SDOH — SOCIAL STABILITY: SOCIAL INSECURITY: DO YOU FEEL ANYONE HAS EXPLOITED OR TAKEN ADVANTAGE OF YOU FINANCIALLY OR OF YOUR PERSONAL PROPERTY?: NO

## 2024-01-05 SDOH — SOCIAL STABILITY: SOCIAL INSECURITY: ABUSE: ADULT

## 2024-01-05 SDOH — SOCIAL STABILITY: SOCIAL INSECURITY: ARE THERE ANY APPARENT SIGNS OF INJURIES/BEHAVIORS THAT COULD BE RELATED TO ABUSE/NEGLECT?: NO

## 2024-01-05 ASSESSMENT — ACTIVITIES OF DAILY LIVING (ADL)
WALKS IN HOME: NEEDS ASSISTANCE
ADEQUATE_TO_COMPLETE_ADL: YES
FEEDING YOURSELF: INDEPENDENT
LACK_OF_TRANSPORTATION: NO
ASSISTIVE_DEVICE: WALKER
BATHING: INDEPENDENT
TOILETING: NEEDS ASSISTANCE
JUDGMENT_ADEQUATE_SAFELY_COMPLETE_DAILY_ACTIVITIES: YES
HEARING - RIGHT EAR: FUNCTIONAL
PATIENT'S MEMORY ADEQUATE TO SAFELY COMPLETE DAILY ACTIVITIES?: YES
HEARING - LEFT EAR: FUNCTIONAL
GROOMING: INDEPENDENT
LACK_OF_TRANSPORTATION: NO
DRESSING YOURSELF: NEEDS ASSISTANCE

## 2024-01-05 ASSESSMENT — COLUMBIA-SUICIDE SEVERITY RATING SCALE - C-SSRS
2. HAVE YOU ACTUALLY HAD ANY THOUGHTS OF KILLING YOURSELF?: NO
2. HAVE YOU ACTUALLY HAD ANY THOUGHTS OF KILLING YOURSELF?: NO
1. IN THE PAST MONTH, HAVE YOU WISHED YOU WERE DEAD OR WISHED YOU COULD GO TO SLEEP AND NOT WAKE UP?: NO
1. IN THE PAST MONTH, HAVE YOU WISHED YOU WERE DEAD OR WISHED YOU COULD GO TO SLEEP AND NOT WAKE UP?: NO
6. HAVE YOU EVER DONE ANYTHING, STARTED TO DO ANYTHING, OR PREPARED TO DO ANYTHING TO END YOUR LIFE?: NO
6. HAVE YOU EVER DONE ANYTHING, STARTED TO DO ANYTHING, OR PREPARED TO DO ANYTHING TO END YOUR LIFE?: NO

## 2024-01-05 ASSESSMENT — COGNITIVE AND FUNCTIONAL STATUS - GENERAL
DRESSING REGULAR LOWER BODY CLOTHING: A LOT
DRESSING REGULAR UPPER BODY CLOTHING: A LITTLE
TOILETING: A LOT
MOVING FROM LYING ON BACK TO SITTING ON SIDE OF FLAT BED WITH BEDRAILS: A LOT
WALKING IN HOSPITAL ROOM: TOTAL
STANDING UP FROM CHAIR USING ARMS: TOTAL
DAILY ACTIVITIY SCORE: 18
PATIENT BASELINE BEDBOUND: NO
HELP NEEDED FOR BATHING: A LITTLE
MOVING TO AND FROM BED TO CHAIR: TOTAL
MOBILITY SCORE: 8
CLIMB 3 TO 5 STEPS WITH RAILING: TOTAL
TURNING FROM BACK TO SIDE WHILE IN FLAT BAD: A LOT

## 2024-01-05 ASSESSMENT — LIFESTYLE VARIABLES
SUBSTANCE_ABUSE_PAST_12_MONTHS: NO
AUDIT-C TOTAL SCORE: 1
AUDIT-C TOTAL SCORE: 1
HOW MANY STANDARD DRINKS CONTAINING ALCOHOL DO YOU HAVE ON A TYPICAL DAY: 1 OR 2
SKIP TO QUESTIONS 9-10: 1
HOW OFTEN DO YOU HAVE 6 OR MORE DRINKS ON ONE OCCASION: NEVER
HOW OFTEN DO YOU HAVE A DRINK CONTAINING ALCOHOL: MONTHLY OR LESS
PRESCIPTION_ABUSE_PAST_12_MONTHS: NO

## 2024-01-05 ASSESSMENT — PAIN - FUNCTIONAL ASSESSMENT
PAIN_FUNCTIONAL_ASSESSMENT: 0-10

## 2024-01-05 ASSESSMENT — PAIN SCALES - GENERAL
PAINLEVEL_OUTOF10: 0 - NO PAIN
PAIN_LEVEL: 0

## 2024-01-05 ASSESSMENT — PATIENT HEALTH QUESTIONNAIRE - PHQ9
SUM OF ALL RESPONSES TO PHQ9 QUESTIONS 1 & 2: 0
2. FEELING DOWN, DEPRESSED OR HOPELESS: NOT AT ALL
1. LITTLE INTEREST OR PLEASURE IN DOING THINGS: NOT AT ALL

## 2024-01-05 NOTE — H&P
History and physical is reviewed, patient re evaluated, no changes.  Procedure, risks, benefits, and postoperative course were discussed with the patient and consent is reviewed.    Isaiah Shaw MD

## 2024-01-05 NOTE — DISCHARGE INSTRUCTIONS
"    Knee Replacement Discharge Instructions:   Dr. Isaiah Shaw    Physical Therapy / Range of Motion:    Once you are discharged from the hospital, whether you go home or to a rehabilitation facility, you should have therapy. The minimum for a knee replacement is two-three times per week.  If you find that either at home or in a rehabilitation facility, you are not receiving the appropriate amount of therapy, please call our office immediately. Therapy CANNOT be postponed or delayed!  Therapy should be done by patient on days without appointments.    Knee replacements should ideally achieve 90 degrees of flexion by 2 weeks from surgery.  Ask your therapist after each session \"What is my RANGE OF MOTION?\"  Your physician will be asking you this question at your first post-operative appointment, and each following appointment.  It is also important to work on getting the knee straight and at rest attempt to keep the knee as straight as possible.    Discharge to rehab:  If you go to a rehabilitation facility, discharge to home is determined by the specific staff at the facility when they deem you are safe to return home.  Your surgeon and his staff are not allowed to make this decision. Please inquire with the facility therapist, , and medical personnel.      Discharge to home:  Eventually, therapy will progress to an outpatient setting (ideally around 2 weeks from surgery), where you physically go to a therapy facility, either here at [facility] or somewhere close to your home. This will typically continue for at least six weeks following your surgery.  In certain cases, this may be longer depending on your progress.     Medications:  You have been prescribed medication to prevent blood clots, please follow the instructions and dosage information you were given. Please wear the ARISTEO hose stockings that you were given to help prevent blood clots for two weeks postoperatively, and do your " exercises after surgery as these will help reduce your risk of blood clots.     Prescriptions will be given to you upon discharge for pain medicine as well as blood thinning medication. Please remember to take your pain medication as directed. It is very important that you take your short-acting pain medication one hour before scheduled physical therapy. This will allow you to participate aggressively and achieve the necessary goals.     -Every effort must be made to prevent an infection in your artificial joint.  EVERY dentist or doctor that works with you should know that you have an artificial joint.  Antibiotics MUST be used before and after ANY dental procedure.    If you need a refill, please notify the office at LEAST 48 hours before you will be out of your medication. Some pain medications cannot be called in to a pharmacy and have to be printed on paper and picked up at the office. Any refills on pain medication need to be requested during clinic office hours, 8-5 on Mon-Fri.  We cannot refill pain medications on the weekend.    Wound Care:  Leave waterproof dressing in place.  As long as the Aquacel dressing is dry, intact, and occlusive at the borders, you may shower as the dressing is waterproof.     You may reinforce with other dressing materials as needed (gauze, ACE wraps, etc.) if drainage is noted, then please contact us.    May remove  the dressing at 7 days post-op.  If you are discharged to a rehab facility, the nursing staff may remove your Aquacel dressing at 7 days post-op.     You may shower normally, allowing water to run over the incision site, at ~14 days.  DO NOT RUB OR SCRUB INCISION. NO SOAKING IN A TUB OR STANDING WATER UNTIL INCISION IS WELL-HEALED AND SCABS HAVE DISAPPEARED.    -Do not apply any lotions, creams, ointments, peroxide, betadine or gels to incision and surrounding area.          -Apply ice to affected area as tolerated.      Driving:  Must be off of narcotic pain  medication and have good leg control! Approximately right le-6 weeks and left le weeks.  Please discuss with Dr. Shaw at first post-op visit prior to resuming.     Follow-up Appointments:   Please call your surgeon's office if you are unsure about your post-op appointments. Your first post-operative appointment is made prior to surgery.     Prior, during, and after your surgery and hospital stay, please do not hesitate to call our office with any questions or concerns.    Our staff will be happy to assist you in any possible way during your personal journey through joint replacement.

## 2024-01-05 NOTE — CARE PLAN
The clinical goals for the shift include Ambulate to the chair      Problem: Pain - Adult  Goal: Verbalizes/displays adequate comfort level or baseline comfort level  Outcome: Progressing  Flowsheets (Taken 1/5/2024 1541)  Verbalizes/displays adequate comfort level or baseline comfort level:   Encourage patient to monitor pain and request assistance   Assess pain using appropriate pain scale   Administer analgesics based on type and severity of pain and evaluate response   Implement non-pharmacological measures as appropriate and evaluate response   Consider cultural and social influences on pain and pain management   Notify Licensed Independent Practitioner if interventions unsuccessful or patient reports new pain     Problem: Safety - Adult  Goal: Free from fall injury  Outcome: Progressing  Flowsheets (Taken 1/5/2024 1541)  Free from fall injury:   Instruct family/caregiver on patient safety   Based on caregiver fall risk screen, instruct family/caregiver to ask for assistance with transferring infant if caregiver noted to have fall risk factors     Problem: Discharge Planning  Goal: Discharge to home or other facility with appropriate resources  Outcome: Progressing  Flowsheets (Taken 1/5/2024 1541)  Discharge to home or other facility with appropriate resources:   Identify barriers to discharge with patient and caregiver   Arrange for needed discharge resources and transportation as appropriate   Identify discharge learning needs (meds, wound care, etc)   Arrange for interpreters to assist at discharge as needed   Refer to discharge planning if patient needs post-hospital services based on physician order or complex needs related to functional status, cognitive ability or social support system     Problem: Chronic Conditions and Co-morbidities  Goal: Patient's chronic conditions and co-morbidity symptoms are monitored and maintained or improved  Outcome: Progressing  Flowsheets (Taken 1/5/2024 1541)  Care Plan  - Patient's Chronic Conditions and Co-Morbidity Symptoms are Monitored and Maintained or Improved:   Monitor and assess patient's chronic conditions and comorbid symptoms for stability, deterioration, or improvement   Collaborate with multidisciplinary team to address chronic and comorbid conditions and prevent exacerbation or deterioration   Update acute care plan with appropriate goals if chronic or comorbid symptoms are exacerbated and prevent overall improvement and discharge     Problem: Skin  Goal: Prevent/minimize sheer/friction injuries  Outcome: Progressing  Flowsheets (Taken 1/5/2024 1541)  Prevent/minimize sheer/friction injuries:   Complete micro-shifts as needed if patient unable. Adjust patient position to relieve pressure points, not a full turn   HOB 30 degrees or less   Increase activity/out of bed for meals   Turn/reposition every 2 hours/use positioning/transfer devices   Use pull sheet  Goal: Promote/optimize nutrition  Outcome: Progressing  Flowsheets (Taken 1/5/2024 1541)  Promote/optimize nutrition:   Offer water/supplements/favorite foods   Consume > 50% meals/supplements   Monitor/record intake including meals  Goal: Promote skin healing  Outcome: Progressing  Flowsheets (Taken 1/5/2024 1541)  Promote skin healing:   Assess skin/pad under line(s)/device(s)   Ensure correct size (line/device) and apply per  instructions   Protective dressings over bony prominences   Rotate device position/do not position patient on device   Turn/reposition every 2 hours/use positioning/transfer devices     Problem: Diabetes  Goal: Achieve decreasing blood glucose levels by end of shift  Outcome: Progressing  Flowsheets (Taken 1/5/2024 1541)  Achieve decreasing blood glucose levels by end of shift: Med administration/monitoring of effect  Goal: Increase stability of blood glucose readings by end of shift  Outcome: Progressing  Flowsheets (Taken 1/5/2024 1541)  Increase stability of blood glucose  readings by end of shift: Med administration/monitoring of effect  Goal: Maintain electrolyte levels within acceptable range throughout shift  Outcome: Progressing  Flowsheets (Taken 1/5/2024 1541)  Maintain electrolyte levels within acceptable range throughout shift: Med administration/monitoring of effect  Goal: Maintain glucose levels >70mg/dl to <250mg/dl throughout shift  Outcome: Progressing  Flowsheets (Taken 1/5/2024 1541)  Maintain glucose levels >70mg/dl to <250mg/dl throughout shift: Med administration/monitoring of effect  Goal: No changes in neurological exam by end of shift  Outcome: Progressing  Flowsheets (Taken 1/5/2024 1541)  No changes in neurological exam by end of shift: Complete frequent neurological assessments     Problem: Fall/Injury  Goal: Not fall by end of shift  Outcome: Progressing

## 2024-01-05 NOTE — PROGRESS NOTES
01/05/24 1519   Discharge Planning   Living Arrangements Alone   Support Systems Children   Assistance Needed PTA ind ADLS and IADLS no AD, works from home, no falls. Owns walker, cane   Type of Residence Private residence  ([place] daughter [address] [place], [address], best phone [phone] for 3-5 days, 1 level home, 4 steps to enter.)   Number of Stairs to Enter Residence 4  (at daughters house)   Number of Stairs Within Residence 0   Do you have animals or pets at home? Yes   Type of Animals or Pets 1 cat and 1 dog at daughters and 1 cat at own home  (at daughters house)   Who is requesting discharge planning? Patient   Home or Post Acute Services In home services   Type of Home Care Services Home PT;Home OT   Patient expects to be discharged to: Home with [facility], staying at daughter home   Does the patient need discharge transport arranged? No   Financial Resource Strain   How hard is it for you to pay for the very basics like food, housing, medical care, and heating? Not hard   Housing Stability   In the last 12 months, was there a time when you were not able to pay the mortgage or rent on time? N   In the last 12 months, how many places have you lived? 1   In the last 12 months, was there a time when you did not have a steady place to sleep or slept in a shelter (including now)? N   Transportation Needs   In the past 12 months, has lack of transportation kept you from medical appointments or from getting medications? no   In the past 12 months, has lack of transportation kept you from meetings, work, or from getting things needed for daily living? No     Pt is s/p Left knee replacement today 1/5/23 with Dr. Isaiah Shaw, Therapy evals ordered but currently pending, Pts DC preference is home with [facility] and agency of choice is [facility] who she had in past. pt staying with daughter at [address] [place], [address], best phone [phone] for 3-5 days. CNP notified of pts C choice.  [facility]  notified.

## 2024-01-05 NOTE — ANESTHESIA PROCEDURE NOTES
Spinal Block    Patient location during procedure: OR  Start time: 1/5/2024 12:10 PM  End time: 1/5/2024 12:15 PM  Reason for block: primary anesthetic and at surgeon's request  Staffing  Performed: attending   Authorized by: Luis Tam DO    Performed by: Luis Tam DO    Preanesthetic Checklist  Completed: patient identified, IV checked, risks and benefits discussed, surgical consent, monitors and equipment checked, pre-op evaluation, timeout performed and sterile techniques followed  Block Timeout  RN/Licensed healthcare professional reads aloud to the Anesthesia provider and entire team: Patient identity, procedure with side and site, patient position, and as applicable the availability of implants/special equipment/special requirements.    Timeout performed at: 1/5/2024 12:05 PM  Spinal Block  Patient position: sitting  Prep: ChloraPrep  Sterility prep: cap, drape, gloves, hand hygiene and mask  Sedation level: light sedation  Patient monitoring: blood pressure, continuous pulse oximetry and heart rate  Approach: midline  Vertebral space: L3-4  Injection technique: single-shot  Needle  Needle type: 24g Pencan needle with introducer.   Needle gauge: 24 G  Needle length: 5 in  Free flowing CSF: yes    Assessment  Sensory level: T10 bilateral  Block outcome: patient comfortable  Procedure assessment: patient sedated but conversant throughout procedure and patient tolerated procedure well with no immediate complications  Additional Notes  Spinal, sitting position. 50 mcg IV fentanyl given for sedation. L3-L4 level approached midline. 24g Pencan needle with introducer utilized. 1 attempt. +CSF flow with swirl. No heme, no paresthesias. Patient tolerated procedure well

## 2024-01-05 NOTE — ANESTHESIA PROCEDURE NOTES
Peripheral Block    Patient location during procedure: pre-op  Start time: 1/5/2024 11:45 AM  End time: 1/5/2024 11:55 AM  Reason for block: at surgeon's request and post-op pain management  Staffing  Performed: attending   Authorized by: Luis Tam DO    Performed by: Luis Tam DO  Preanesthetic Checklist  Completed: patient identified, IV checked, site marked, risks and benefits discussed, surgical consent, monitors and equipment checked, pre-op evaluation and timeout performed   Timeout performed at: 1/5/2024 11:45 AM  Peripheral Block  Patient position: laying flat  Prep: ChloraPrep  Patient monitoring: heart rate, cardiac monitor and continuous pulse ox  Block type: adductor canal (+ IPACK)  Laterality: left  Injection technique: single-shot  Guidance: ultrasound guided  Local infiltration: lidocaine  Infiltration strength: 1 %  Dose: 5 mL  Needle  Needle gauge: 22 G  Needle length: 10 cm  Needle localization: anatomical landmarks and ultrasound guidance  Assessment  Injection assessment: negative aspiration for heme, no paresthesia on injection, incremental injection and local visualized surrounding nerve on ultrasound  Paresthesia pain: none  Heart rate change: no  Slow fractionated injection: yes  Additional Notes  Adductor canal and IPACK nerve blocks performed for post-operative analgesia. Sterile prep. 2 mg IV versed given for procedural sedation. 20 mL of 0.5% ropivacaine with decadron 5 mg given for the adductor canal block. 20 mL of 0.2% ropivacaine given for the IPACK block. Patient tolerated the procedure well. No pain or paresthesias with injection. Negative aspiration for heme every 5 mL. No complications.

## 2024-01-05 NOTE — CONSULTS
Consults    Reason For Consult  Diabetes management     History Of Present Illness  Savi Govea is a 63 y.o. female presents to the orthopedics team with increased pain and decreased ability to perform ADLS due to left knee OA. After failed conservative treatment, patient underwent surgery with no immediate post op complications, reports minimal pain to site described as dull in nature, mild in severity, responding well to pain meds. No other complaints. Hospitalist services were consulted for management of the patient's medical conditions post/op.  Patient examined and seen, resting comfortably. Denies chest pain, shortness of breath, abdominal pain, dizziness, fever or chills. Currently patient vital signs are stable. Plan of care was discussed with patient, verbalized understanding through teach back method. Hospitalist team will continue to follow until discharge.    Review of systems: 10 system were reviewed and were negative except what was mentioned in history of present illness       Past Medical History  Arrhythmia, Diabetes type 2, OA, HLD, HTN, heart palpitations, renal lithiasis, cervical herniated disc     Surgical History  She has a past surgical history that includes Tonsillectomy (03/18/2015); Tubal ligation (03/18/2015); Hand surgery (03/18/2015); Knee arthroscopy w/ debridement (03/18/2015); Endometrial ablation; Dilation and curettage of uterus; Joint replacement (Right); Carpal tunnel release; and Lipoma resection (Left).     Social History  Former smoker  Denies drug or alcohol     Family History  Family History   Problem Relation Name Age of Onset    Uterine cancer Mother      Hypertension Father      Thyroid cancer Sister      Diabetes Brother      Graves' disease Brother      Multiple sclerosis Brother      Diabetes Maternal Grandmother      Skin cancer Maternal Grandmother      Hypertension Maternal Grandfather      Diabetes Paternal Grandfather      Hypertension Paternal Grandfather           Allergies  Aspirin, Cefditoren, Cetirizine, Fexofenadine, Phenylephrine-guaifenesin, Pseudoephedrine-guaifenesin, Salicylates, and Spectracef [cefditoren pivoxil]      Physical Exam  Constitutional: Well developed, awake/alert/oriented x3, cooperative  Eyes: PERRL,  clear sclera  ENMT: mucous membranes moist, no apparent injury, no lesions seen  Head/Neck: Neck supple, no apparent injury,  Respiratory/Thorax: Patent airways,  normal breath sounds with good chest expansion, thorax symmetric  Cardiovascular: Regular, rate and rhythm, no murmurs, 2+ equal pulses of the extremities, normal S 1and S 2  Gastrointestinal: Nondistended, soft, non-tender,   Musculoskeletal: mild decrease range of motion to left knee  s/p sx intervention, good capillary refills bilaterally, +2 pulses, good sensation   Extremities: normal extremities,  incision covered with splint and immobilizer   Skin: warm, dry, intact  Neurological: alert/oriented x 3, speech clear  Psychiatric: appropriate mood and behavior         Last Recorded Vitals  /66 (BP Location: Right arm)   Pulse 64   Temp 36 °C (96.8 °F) (Temporal)   Resp 16   Wt 115 kg (253 lb 8.5 oz)   SpO2 98%     Relevant Results  Scheduled medications  acetaminophen, 650 mg, oral, q6h CELIA  docusate sodium, 100 mg, oral, BID  empagliflozin, 25 mg, oral, Daily  [START ON 1/6/2024] enoxaparin, 30 mg, subcutaneous, q12h CELIA  glimepiride, 4 mg, oral, BID  [Held by provider] losartan, 100 mg, oral, Daily  magnesium oxide, 400 mg, oral, BID  [Held by provider] metFORMIN, 1,000 mg, oral, BID with meals  metoprolol succinate XL, 25 mg, oral, Daily  [START ON 1/6/2024] SITagliptin phosphate, 100 mg, oral, Daily  tranexamic acid, 1,950 mg, oral, Once  [START ON 1/6/2024] tranexamic acid, 1,950 mg, oral, Once  vancomycin, 1.5 g, intravenous, q12h      Continuous medications  lactated Ringer's, 50 mL/hr, Last Rate: 50 mL/hr (01/05/24 1524)  oxygen, 2 L/min      PRN medications  PRN  medications: benzocaine-menthol, bisacodyl, cyclobenzaprine, diphenhydrAMINE, HYDROmorphone, morphine, naloxone, ondansetron ODT **OR** ondansetron, oxyCODONE, oxyCODONE    Results for orders placed or performed during the hospital encounter of 01/05/24 (from the past 24 hour(s))   POCT GLUCOSE   Result Value Ref Range    POCT Glucose 192 (H) 74 - 99 mg/dL          Assessment/Plan     # Left Knee Pain  Left Knee Arthoplasty  Admit to Orthopedics Team   Hospitalist team Consulted   DVTp and Pain management per Ortho   PT/OT evaluation  and treatment   CBC/BMP as appropriate, review results  Pulmonary Toileting   Follow up as needed outpatient with Orthopedics     # Diabetes Mellitus Type 2  Continue home medications  Diet Cardiac/Diabetic  Continue Sliding Scale SSI AC/HS   A1C  7.5   Encourage healthy lifestyle changes  Continue Jardiance, Januvia, Glimepride  Hold Metformin at this time review labs in AM    # HTN/ HLD Hx of Arrhythmias  Telemetry for arrhythmia monitoring    Continue Lopressor, Magnesium   Hold Losartan  until AM labs reviewed  Hemodynamically stable     # Obesity  Encourage healthy lifestyle changes  BMI  42     Thank you for consult  Medicine to continue to follow  Call for acute needs    Time spent  48 minutes obtaining labs, imaging, recommendations, interview, assessment, examination, medication review/ordering, and EMR review.    Plan of care was discussed extensively with patient. Patient verbalized understanding through teach back method. All questions and concerns addressed upon examination.     Of note, this documentation is completed using the Dragon Dictation system (voice recognition software). There may be spelling and/or grammatical errors that were not corrected prior to final submission.        Qi Todd, APRN-CNP

## 2024-01-05 NOTE — OP NOTE
Operative Note     Date: 2024  OR Location: [facility]    Name: Savi Govea, : 1960, Age: 63 y.o., MRN: [id number], Sex: female    Diagnosis  Pre-op Diagnosis     * Unilateral primary osteoarthritis, left knee [M17.12] Post-op Diagnosis     * Unilateral primary osteoarthritis, left knee [M17.12]     Procedures  OPEN TOTAL KNEE ARTHROPLASTY BLOCK PER TKA  PROTOCOL, WING  16482 - MA ARTHRP KNE CONDYLE&PLATU MEDIAL&LAT COMPARTMENTS  (LEFT)    Surgeons      * Isaiah Shaw - Primary    Resident/Fellow/Other Assistant:  Surgeon(s) and Role:     * Niranjan Wesley PA-C - Assisting    Procedure Summary  Anesthesia: Spinal  ASA: III  Anesthesia Staff: Anesthesiologist: Luis Tam DO  Estimated Blood Loss: 50 mL  Intra-op Medications:   Medication Name Total Dose   sodium chloride 0.9 % irrigation solution 3,000 mL   ropivacaine-epinephrine-clonidine-ketorolac 2.46-0.005- 0.0008-0.3mg/mL periarticular syringe 100 mL   sterile water irrigation solution 1,000 mL   dexAMETHasone (PF) (Decadron) 5 mg, ropivacaine (Naropin) 100 mg injection Cannot be calculated   ropivacaine (Naropin) 0.2 % injection solution 40 mg 40 mg              Anesthesia Record               Intraprocedure I/O Totals          Intake    Propofol Drip 0.00 mL    The total shown is the total volume documented since Anesthesia Start was filed.    Total Intake 0 mL          Specimen: No specimens collected     Staff:   Circulator: Manny Leal RN  Relief Circulator: Tami Valderrama RN  Scrub Person: Keke Jiang           Implants:    Implants       Type Name Action Serial No.      Joint BASEPLATE, TIBIA 4 TS TRIATH - [id number] Implanted      Joint FEM COMP, TRIATH CR SZ4 LEFT - [id number] Implanted      Joint INSERT, TIBIAL, X3 TRIATHLON CS, SZ-4 12MM - [id number] Implanted      Joint PATELLA, ASYMM TRIATH 29MM x 9MM - [id number] Implanted               Findings: see procedure details    Indications:     The patient was seen in the  preoperative area. The risks, benefits, complications, treatment options, non-operative alternatives, expected recovery and outcomes were discussed with the patient. The possibilities of reaction to medication, pulmonary aspiration, injury to surrounding structures, bleeding, recurrent infection, the need for additional procedures, failure to diagnose a condition, and creating a complication requiring transfusion or operation were discussed with the patient. The patient concurred with the proposed plan, giving informed consent.  The site of surgery was properly noted/marked if necessary per policy. The patient has been actively warmed in preoperative area. Preoperative antibiotics have been ordered and given within 1 hours of incision. Venous thrombosis prophylaxis have been ordered.    The patient failed multiple attempts at non-surgical management.  Specific to TKA we discussed the risks of infection / revision surgery, DVT/PE, medical complications, stiffness / loss of motion, neurologic (foot drop) or vascular injury.    Procedure Details:     Patient was met prior to surgery in pre-operative holding.  The appropriate extremity was marked and recent health status was reviewed and we found no contraindication to proceeding with the scheduled procedure.  We again reviewed the risks of infection, wound issues, deep venous thrombosis, pulmonary embolism, medical complications including cardiac and pulmonary, neurologic and vascular injury and incomplete relief of pre-operative pain.    The patient discussed regional anesthesia in pre-op holding.  The patient was transported to the operating room.  A thigh tourniquet was placed and a small bump on the buttock.  The patient was resting comfortably in a supine position with all jorge prominences well padded.  Sequential compression device was placed on the contralateral lower extremity.  An exam under anesthesia was performed to evaluate the patient´s range of motion  and ligamentous integrity.    The patient was prepped and draped in the usual sterile fashion.  The leg was placed in a well padded leg pendleotn.  After prep, drape, antibiotic and time out, the leg was exsanguinated and the tourniquet inflated.  A longitudinal incision was made over the anterior knee.  The dissection was carried out through the skin and subcutaneous tissue.  A medial parapatellar arthrotomy was performed.  An effusion and synovitis was noted compatible with the grade IV changes of the articular cartilage.  The fat pad was slightly de-bulked, Cascade´s line was marked and some of the deep medial collateral ligament was released from the tibia.  The ACL was resected. The knee was flexed up and medial and lateral retractors were placed to protect the collateral ligaments and popliteus tendon.      Due to the degree of deformity and stiffness, the posterior cruciate ligament was resected.    A canal finder reamer was utilized to gain entry into the femur.  An intramedullary krishna was placed by hand and set to 5 degrees of valgus for the distal femoral cut.  The cutting block was placed and the distal femoral cut was performed.   A sizing guide was then placed and the femoral size was measured based on posterior referencing.  The 4-in-1 cutting block was placed set to 3 degrees of external rotation.  The rotation was confirmed based on the transepicondylar axis and Carmen´s line.  There was no significant hypoplasia of the posterior condyles.     Once the cutting block was placed the cuts were performed: anterior, posterior and chamfer cuts.  There collaterals were protected and the bone was removed.  There was no notching of the femur.   Once the femoral cuts were complete, we turned our attention to the tibia.  Retractors were placed medial, lateral and posteriorly.  An extramedullary alignment krishna was used and the slope was set in accordance with the implant.  We measured 2 mm of resection from the  lowest point on the tibia.  The tibial cut was completed with care not to encroach posterior to the knee joint.    After the tibial cut was completed, we removed the remaining menisci.  Using a curved osteotome posterior osteophytes were carefully removed from the femur.  We then assessed the flexion and extension gaps using trial spacer blocks.    The gaps appeared symmetric with a spacer and we proceeded to placing trial implants.  With symmetric gaps we progressed to placing trial implants.The final poly was a 12 CS.    A trial tibial component was placed with care to avoid overhang.  The rotation was set in line with the medial third of the tibial tubercle.  A trial femoral component was then placed, followed by a trial articular surface.  The knee was taken through range of motion with the provisional components.  The flexion and extension gaps were evaluated, as well as the patellar tracking and mid-flexion stability.  The following soft tissue balancing, recuts, and/or modifications were required: release of additional deep MCL off the tibia.      The patella was everted and ~9 mm of bone were removed from the thickest portion using a clamp/cutting guide.  The lug holes were drilled in the patella and femur.  The tibia was prepared with the drill and broach after confirmation of alignment using a drop krishna.   The sclerotic areas of the bone were drilled using a small drill bit.  The capsule around the knee was injected using a long acting local anesthetic / multimodal pain mixture.    The cement was mixed in a vacuum sealed fashion while the bone was pulsatile lavaged.   The bone was dried and the implants were placed with a gentle posterior directed force and a clamp on the patella.  The excess cement was removed.   After the cement dried the gap balancing was reassessed prior to placing the final articular surface.  The proud cement mantle was removed using a small osteotome.  The knee was then copiously  lavaged with sterile saline and Irrisept (0.05% chlorhexidine gluconate).  The tourniquet was taken down and hemostasis was obtained using Bovie electrocautery and tranexamic acid (per protocol).  No significant bleeders were encountered and the usage of a drain was not felt to be necessary.    A layered closure was performed using 1 Vicryl and 1 Stratafix in the retinacular layer and quadriceps tendon.  2-0 vicryl in the deep dermal layer and monofilament in the skin.  An adherent, water proof dressing was placed followed by Webril and an ace bandage. All counts were reported as correct.  The patient was stable to the post anesthesia care unit.    I was present and participated in the entire procedure. The Physician Assistant participated in all critical elements of the procedure under my direct supervision. The surgical incision was closed by the PA under my indirect supervision. There were no qualified residents available to assist.    The physician assistant was present for the entire case.  Given the nature of the procedure and disease process, a skilled surgical assistant was necessary for the case.  The assistant was necessary for retraction and helped directly facilitate completion of the surgery.  A certified scrub tech was at the back table managing instruments and supplies for the surgical procedure.    Complications:  None; patient tolerated the procedure well.    Disposition: PACU - hemodynamically stable.  Condition: stable         Isaiah Shaw  Phone Number: [phone]

## 2024-01-05 NOTE — PROGRESS NOTES
Physical Therapy                 Therapy Communication Note    Patient Name: Savi Govea  MRN: [id number]  Today's Date: 1/5/2024     Discipline: Physical Therapy    Missed Visit Reason: Missed Visit Reason:  (PT order received. Pt. is POD#0 for TKR. Pt. returned to [facility] at 1510 and has significant numbness in BLE from recent spinal and nerve block. Will plan to re-attempt PT eval on POD#1.)    Missed Time: Attempt 1600

## 2024-01-05 NOTE — ANESTHESIA POSTPROCEDURE EVALUATION
Patient: Savi Govea    Procedure Summary       Date: 01/05/24 Room / Location: [facility] / [facility]    Anesthesia Start: 1204 Anesthesia Stop: 1404    Procedure: OPEN TOTAL KNEE ARTHROPLASTY BLOCK PER TKA  PROTOCOL, WING (Left: Knee) Diagnosis:       Unilateral primary osteoarthritis, left knee      (Unilateral primary osteoarthritis, left knee [M17.12])    Surgeons: Isaiah Shaw MD Responsible Provider: Luis Tam DO    Anesthesia Type: MAC, regional, spinal ASA Status: 3            Anesthesia Type: MAC, regional, spinal    Vitals Value Taken Time   /46 01/05/24 1358   Temp 36.2 01/05/24 1404   Pulse 68 01/05/24 1402   Resp 31 01/05/24 1402   SpO2 92 % 01/05/24 1402   Vitals shown include unvalidated device data.    Anesthesia Post Evaluation    Patient location during evaluation: PACU  Patient participation: complete - patient participated  Level of consciousness: awake  Pain score: 0  Pain management: satisfactory to patient  Multimodal analgesia pain management approach  Airway patency: patent  Cardiovascular status: acceptable and hemodynamically stable  Respiratory status: acceptable, nonlabored ventilation, spontaneous ventilation and nasal cannula  Hydration status: acceptable  Postoperative Nausea and Vomiting: none        No notable events documented.

## 2024-01-06 ENCOUNTER — DOCUMENTATION (OUTPATIENT)
Dept: HOME HEALTH SERVICES | Facility: HOME HEALTH | Age: 64
End: 2024-01-06
Payer: COMMERCIAL

## 2024-01-06 ENCOUNTER — HOME HEALTH ADMISSION (OUTPATIENT)
Dept: HOME HEALTH SERVICES | Facility: HOME HEALTH | Age: 64
End: 2024-01-06
Payer: COMMERCIAL

## 2024-01-06 VITALS
OXYGEN SATURATION: 95 % | HEART RATE: 65 BPM | WEIGHT: 253.53 LBS | RESPIRATION RATE: 16 BRPM | HEIGHT: 65 IN | DIASTOLIC BLOOD PRESSURE: 67 MMHG | SYSTOLIC BLOOD PRESSURE: 132 MMHG | TEMPERATURE: 96.8 F | BODY MASS INDEX: 42.24 KG/M2

## 2024-01-06 LAB
ANION GAP SERPL CALC-SCNC: 13 MMOL/L (ref 10–20)
BUN SERPL-MCNC: 13 MG/DL (ref 6–23)
CALCIUM SERPL-MCNC: 8.6 MG/DL (ref 8.6–10.3)
CHLORIDE SERPL-SCNC: 102 MMOL/L (ref 98–107)
CO2 SERPL-SCNC: 27 MMOL/L (ref 21–32)
CREAT SERPL-MCNC: 0.77 MG/DL (ref 0.5–1.05)
ERYTHROCYTE [DISTWIDTH] IN BLOOD BY AUTOMATED COUNT: 14.6 % (ref 11.5–14.5)
GFR SERPL CREATININE-BSD FRML MDRD: 88 ML/MIN/1.73M*2
GLUCOSE BLD MANUAL STRIP-MCNC: 103 MG/DL (ref 74–99)
GLUCOSE BLD MANUAL STRIP-MCNC: 185 MG/DL (ref 74–99)
GLUCOSE SERPL-MCNC: 125 MG/DL (ref 74–99)
HCT VFR BLD AUTO: 38 % (ref 36–46)
HGB BLD-MCNC: 11.7 G/DL (ref 12–16)
MCH RBC QN AUTO: 25.2 PG (ref 26–34)
MCHC RBC AUTO-ENTMCNC: 30.8 G/DL (ref 32–36)
MCV RBC AUTO: 82 FL (ref 80–100)
NRBC BLD-RTO: 0 /100 WBCS (ref 0–0)
PLATELET # BLD AUTO: 298 X10*3/UL (ref 150–450)
POTASSIUM SERPL-SCNC: 3.8 MMOL/L (ref 3.5–5.3)
RBC # BLD AUTO: 4.65 X10*6/UL (ref 4–5.2)
SODIUM SERPL-SCNC: 138 MMOL/L (ref 136–145)
WBC # BLD AUTO: 16 X10*3/UL (ref 4.4–11.3)

## 2024-01-06 PROCEDURE — 97161 PT EVAL LOW COMPLEX 20 MIN: CPT | Mod: GP

## 2024-01-06 PROCEDURE — 97116 GAIT TRAINING THERAPY: CPT | Mod: GP,CQ | Performed by: PHYSICAL THERAPY ASSISTANT

## 2024-01-06 PROCEDURE — 2500000004 HC RX 250 GENERAL PHARMACY W/ HCPCS (ALT 636 FOR OP/ED): Performed by: ORTHOPAEDIC SURGERY

## 2024-01-06 PROCEDURE — 2500000001 HC RX 250 WO HCPCS SELF ADMINISTERED DRUGS (ALT 637 FOR MEDICARE OP): Performed by: ORTHOPAEDIC SURGERY

## 2024-01-06 PROCEDURE — 82947 ASSAY GLUCOSE BLOOD QUANT: CPT | Mod: 59

## 2024-01-06 PROCEDURE — 36415 COLL VENOUS BLD VENIPUNCTURE: CPT | Performed by: ORTHOPAEDIC SURGERY

## 2024-01-06 PROCEDURE — 2500000001 HC RX 250 WO HCPCS SELF ADMINISTERED DRUGS (ALT 637 FOR MEDICARE OP): Performed by: REGISTERED NURSE

## 2024-01-06 PROCEDURE — 2500000004 HC RX 250 GENERAL PHARMACY W/ HCPCS (ALT 636 FOR OP/ED): Performed by: REGISTERED NURSE

## 2024-01-06 PROCEDURE — 85027 COMPLETE CBC AUTOMATED: CPT | Performed by: ORTHOPAEDIC SURGERY

## 2024-01-06 PROCEDURE — 97165 OT EVAL LOW COMPLEX 30 MIN: CPT | Mod: GO

## 2024-01-06 PROCEDURE — 7100000011 HC EXTENDED STAY RECOVERY HOURLY - NURSING UNIT

## 2024-01-06 PROCEDURE — 97110 THERAPEUTIC EXERCISES: CPT | Mod: GP,CQ | Performed by: PHYSICAL THERAPY ASSISTANT

## 2024-01-06 PROCEDURE — 80048 BASIC METABOLIC PNL TOTAL CA: CPT | Performed by: ORTHOPAEDIC SURGERY

## 2024-01-06 RX ORDER — ONDANSETRON 4 MG/1
4 TABLET, ORALLY DISINTEGRATING ORAL EVERY 8 HOURS PRN
Qty: 20 TABLET | Refills: 0 | Status: SHIPPED | OUTPATIENT
Start: 2024-01-06 | End: 2024-02-05

## 2024-01-06 RX ORDER — DOCUSATE SODIUM 100 MG/1
100 CAPSULE, LIQUID FILLED ORAL 2 TIMES DAILY
Qty: 60 CAPSULE | Refills: 0 | Status: SHIPPED | OUTPATIENT
Start: 2024-01-06 | End: 2024-02-05

## 2024-01-06 RX ORDER — OXYCODONE HYDROCHLORIDE 5 MG/1
5 TABLET ORAL EVERY 4 HOURS PRN
Qty: 40 TABLET | Refills: 0 | Status: SHIPPED | OUTPATIENT
Start: 2024-01-06 | End: 2024-01-18 | Stop reason: SDUPTHER

## 2024-01-06 RX ADMIN — MAGNESIUM OXIDE 400 MG (241.3 MG MAGNESIUM) TABLET 400 MG: TABLET at 08:46

## 2024-01-06 RX ADMIN — OXYCODONE HYDROCHLORIDE 10 MG: 5 TABLET ORAL at 13:35

## 2024-01-06 RX ADMIN — TRANEXAMIC ACID 1950 MG: 650 TABLET, FILM COATED ORAL at 06:58

## 2024-01-06 RX ADMIN — OXYCODONE HYDROCHLORIDE 10 MG: 5 TABLET ORAL at 08:43

## 2024-01-06 RX ADMIN — SITAGLIPTIN 100 MG: 100 TABLET, FILM COATED ORAL at 08:46

## 2024-01-06 RX ADMIN — EMPAGLIFLOZIN 25 MG: 25 TABLET, FILM COATED ORAL at 08:46

## 2024-01-06 RX ADMIN — GLIMEPIRIDE 4 MG: 2 TABLET ORAL at 08:46

## 2024-01-06 RX ADMIN — METOPROLOL SUCCINATE 25 MG: 25 TABLET, EXTENDED RELEASE ORAL at 08:46

## 2024-01-06 RX ADMIN — DOCUSATE SODIUM 100 MG: 100 CAPSULE, LIQUID FILLED ORAL at 08:46

## 2024-01-06 RX ADMIN — OXYCODONE HYDROCHLORIDE 5 MG: 5 TABLET ORAL at 02:27

## 2024-01-06 RX ADMIN — ACETAMINOPHEN 650 MG: 325 TABLET ORAL at 06:58

## 2024-01-06 RX ADMIN — LOSARTAN POTASSIUM 100 MG: 100 TABLET, FILM COATED ORAL at 08:46

## 2024-01-06 RX ADMIN — ACETAMINOPHEN 650 MG: 325 TABLET ORAL at 12:13

## 2024-01-06 RX ADMIN — ENOXAPARIN SODIUM 30 MG: 30 INJECTION SUBCUTANEOUS at 08:46

## 2024-01-06 ASSESSMENT — PAIN SCALES - GENERAL
PAINLEVEL_OUTOF10: 6
PAINLEVEL_OUTOF10: 4
PAINLEVEL_OUTOF10: 7
PAINLEVEL_OUTOF10: 6
PAINLEVEL_OUTOF10: 3

## 2024-01-06 ASSESSMENT — COGNITIVE AND FUNCTIONAL STATUS - GENERAL
STANDING UP FROM CHAIR USING ARMS: A LITTLE
MOBILITY SCORE: 21
DAILY ACTIVITIY SCORE: 20
TOILETING: A LITTLE
WALKING IN HOSPITAL ROOM: A LITTLE
CLIMB 3 TO 5 STEPS WITH RAILING: A LOT
MOBILITY SCORE: 21
CLIMB 3 TO 5 STEPS WITH RAILING: A LITTLE
PERSONAL GROOMING: A LITTLE
DRESSING REGULAR LOWER BODY CLOTHING: A LITTLE
HELP NEEDED FOR BATHING: A LITTLE
WALKING IN HOSPITAL ROOM: A LITTLE

## 2024-01-06 ASSESSMENT — PAIN - FUNCTIONAL ASSESSMENT
PAIN_FUNCTIONAL_ASSESSMENT: 0-10
PAIN_FUNCTIONAL_ASSESSMENT: 0-10
PAIN_FUNCTIONAL_ASSESSMENT: CPOT (CRITICAL CARE PAIN OBSERVATION TOOL)
PAIN_FUNCTIONAL_ASSESSMENT: 0-10

## 2024-01-06 ASSESSMENT — ACTIVITIES OF DAILY LIVING (ADL)
ADL_ASSISTANCE: INDEPENDENT
ADL_ASSISTANCE: INDEPENDENT
BATHING_ASSISTANCE: MINIMAL

## 2024-01-06 ASSESSMENT — PAIN DESCRIPTION - DESCRIPTORS
DESCRIPTORS: ACHING;BURNING
DESCRIPTORS: ACHING;BURNING

## 2024-01-06 ASSESSMENT — PAIN DESCRIPTION - ORIENTATION
ORIENTATION: LEFT

## 2024-01-06 ASSESSMENT — PAIN DESCRIPTION - LOCATION
LOCATION: LEG
LOCATION: KNEE
LOCATION: KNEE

## 2024-01-06 NOTE — DISCHARGE SUMMARY
Discharge Diagnosis  Unilateral primary osteoarthritis, left knee    Issues Requiring Follow-Up  Left total knee    Test Results Pending At Discharge  Pending Labs       No current pending labs.            Hospital Course   Presented for left total knee replacement.  This went uneventfully.  She recovered well in the PACU and then on the floor.  She passed physical therapy and was stable for discharge home.  She was discharged home on Xarelto    Pertinent Physical Exam At Time of Discharge  Physical Exam  NVI  Home Medications     Medication List      START taking these medications     docusate sodium 100 mg capsule; Commonly known as: Colace; Take 1   capsule (100 mg) by mouth 2 times a day.   ondansetron ODT 4 mg disintegrating tablet; Commonly known as:   Zofran-ODT; Take 1 tablet (4 mg) by mouth every 8 hours if needed for   nausea.   oxyCODONE 5 mg immediate release tablet; Commonly known as: Roxicodone;   Take 1 tablet (5 mg) by mouth every 4 hours if needed for moderate pain (4   - 6) for up to 7 days.   rivaroxaban 10 mg tablet; Commonly known as: Xarelto; Take 1 tablet (10   mg) by mouth once daily.     CONTINUE taking these medications     alpha lipoic acid 300 mg capsule   ammonium lactate 12 % lotion; Commonly known as: Lac-Hydrin   APPLE CIDER VINEGAR ORAL   ascorbic acid 1,000 mg tablet; Commonly known as: Vitamin C   calcium citrate-vitamin D3 200 mg-3.125 mcg (125 unit) tablet   CHROMIUM PICOLINATE ORAL   Cinnamon 500 mg capsule; Generic drug: cinnamon   Claritin 10 mg tablet; Generic drug: loratadine   fluticasone 50 mcg/actuation nasal spray; Commonly known as: Flonase   glimepiride 4 mg tablet; Commonly known as: Amaryl   irbesartan 300 mg tablet; Commonly known as: Avapro   Januvia 100 mg tablet; Generic drug: SITagliptin phosphate   Jardiance 25 mg; Generic drug: empagliflozin   magnesium oxide 400 mg magnesium capsule   meloxicam 15 mg tablet; Commonly known as: Mobic   metFORMIN 1,000 mg  tablet; Commonly known as: Glucophage   metoprolol succinate  mg 24 hr capsule; Commonly known as:   Kapspargo Sprinkle   multivitamin tablet   OneTouch Delica Plus Lancet 30 gauge misc; Generic drug: lancets   OneTouch Verio test strips strip; Generic drug: blood sugar diagnostic   pomegranate fruit extract 250 mg capsule   potassium citrate 99 mg capsule   saccharomyces boulardii 250 mg capsule; Commonly known as: Florastor   Soma 350 mg tablet; Generic drug: carisoprodol   zinc gluconate 50 mg tablet     STOP taking these medications     HYDROcodone-acetaminophen 5-325 mg tablet; Commonly known as: Norco   naloxone 4 mg/0.1 mL nasal spray; Commonly known as: Narcan       Outpatient Follow-Up  Future Appointments   Date Time Provider Department Center   1/25/2024  1:15 PM Isaiah Shaw MD [id number] [place]       Sylvia Mathis MD

## 2024-01-06 NOTE — PROGRESS NOTES
01/06/24 1206   Current Planned Discharge Disposition   Current Planned Discharge Disposition Home H     Pt to DC home today with [facility]. Orders placed and sent to [facility] intake nurse. SOC confirmed for Monday (1/8).

## 2024-01-06 NOTE — CONSULTS
Inpatient consult to Medicine  Consult performed by: TUCKER Melendez-CNP  Consult ordered by: Isaiah Shaw MD  Reason for consult: Post operative management of HTN and DM2        Patient hemodynamically stable with no significant lab abnormalities or arrhythmias on telemetry. Hospital medicine will sign off.

## 2024-01-06 NOTE — PROGRESS NOTES
Patient seen and evaluated.    Wound is clean dry and intact.    The patient is awake alert and oriented.    Surgical limb is neurovascularly intact.    Appropriate wound care, nursing care, discharge planning, medical management ,and monitoring of labs will continue    Anticipate DC home today pending PT eval and passing.    Sylvia Rodriguez MD

## 2024-01-06 NOTE — PROGRESS NOTES
Physical Therapy    Physical Therapy Evaluation    Patient Name: Savi Govea  MRN: [id number]  Today's Date: 1/6/2024   Time Calculation  Start Time: 0901  Stop Time: 0919  Time Calculation (min): 18 min    Assessment/Plan   PT Assessment  PT Assessment Results: Decreased strength, Decreased range of motion, Decreased endurance, Impaired balance, Decreased mobility, Pain  Rehab Prognosis: Good  Evaluation/Treatment Tolerance: Patient limited by fatigue, Patient limited by pain  End of Session Communication: Bedside nurse (PTA)  Assessment Comment: pt with decreased mobility/gait strength rom balance and endurance pt to benefit from skilled PT to address deficits and improve functional mobility  End of Session Patient Position: Up in chair, Alarm on (BLE elevated ice on L Knee)  IP OR SWING BED PT PLAN  Inpatient or Swing Bed: Inpatient  PT Plan  Treatment/Interventions: Bed mobility, Transfer training, Gait training, Stair training, Balance training, Strengthening, Endurance training, Range of motion, Therapeutic exercise, Therapeutic activity, Home exercise program  PT Plan: Skilled PT  PT Frequency: BID  PT Discharge Recommendations: Low intensity level of continued care  PT Recommended Transfer Status: Assist x1, Assistive device  PT - OK to Discharge: Yes    Subjective     Current Problem:  Patient Active Problem List   Diagnosis    Primary osteoarthritis of right knee    Abnormal increased muscle tone    Acute lumbar radiculopathy    Shoulder capsulitis, right    Cervical neuritis    Cervical spondylosis with radiculopathy    Chronic left shoulder pain    Essential hypertension    Fatigue    Ganglion cyst    Hyperglycemia    Hyperlipidemia    Left cervical radiculopathy    Mechanical low back pain    Neck pain    Obesity    Osteoarthritis of left hip    Obesity    Condition not found    Knee pain    Status post right knee replacement    Arthropathy of multiple sites    Diabetes mellitus, type 2 (CMS/HCC)     Unilateral primary osteoarthritis, left knee    S/P reverse total shoulder arthroplasty, unspecified laterality    Osteoarthritis, knee       General Visit Information:  General  Reason for Referral: S/P  L TKA  Referred By: PT/OT  Louis 1/5  Past Medical History Relevant to Rehab: HTN , OA, DM2, IBS UTI, R  TKA  Prior to Session Communication: Bedside nurse (cleared to see for therapy stuart.)  Patient Position Received: Bed, 3 rail up, Alarm on  General Comment: pt is a 62 yo female came to the hospital on 1/5 for an elective L TKA  with Dr. Darling . pt currently WBAT L LE    Home Living:  Home Living  Type of Home: House  Lives With: Alone  Home Adaptive Equipment: None  Home Layout: One level  Home Access: Ramped entrance  Bathroom Shower/Tub: Tub/shower unit  Bathroom Equipment: Grab bars in shower, Shower chair with back  Home Living Comments: pt will be going to [facility] house for a few days 1 level  with 4 steps to enter with hand rail.  tub shower no equipment .    Prior Level of Function:  Prior Function Per Pt/Caregiver Report  Level of [place]: Independent with ADLs and functional transfers, Independent with homemaking with ambulation  ADL Assistance: Independent  Homemaking Assistance: Independent  Ambulatory Assistance: Independent  Prior Function Comments: per pt ind with all mobility adls and iadls . no falls still drives    Precautions:  Precautions  LE Weight Bearing Status: Weight Bearing as Tolerated  Medical Precautions: Fall precautions    Objective     Pain:  Pain Assessment  Pain Assessment: 0-10  Pain Score: 6  Pain Type: Surgical pain  Pain Location: Knee  Pain Orientation: Left    Cognition:  Cognition  Overall Cognitive Status: Within Functional Limits    General Assessments:    Coordination  Movements are Fluid and Coordinated: Yes     Dynamic Sitting Balance  Dynamic Sitting-Comments: good  Dynamic Standing Balance  Dynamic Standing-Comments: fair +    Functional Assessments:     Bed  Mobility  Bed Mobility: Yes  Bed Mobility 1  Bed Mobility 1: Supine to sitting, Scooting  Level of Assistance 1: Modified independent  Transfers  Transfer: Yes  Transfer 1  Technique 1: Sit to stand, Stand to sit  Transfer Device 1: Walker  Transfer Level of Assistance 1: Modified independent  Ambulation/Gait Training  Ambulation/Gait Training Performed: Yes  Ambulation/Gait Training 1  Surface 1: Level tile  Device 1: Rolling walker  Assistance 1: Close supervision  Quality of Gait 1: Inconsistent stride length, Antalgic  Comments/Distance (ft) 1: 150 ft with sup FWW . cues for heel toe gait      Extremity/Trunk Assessments:     RLE   RLE : Within Functional Limits  LLE   LLE : Exceptions to WFL  AROM LLE (degrees)  L Knee Flexion 0-130: 90  L Knee Extension 0-130: 0  Strength LLE  L Hip Flexion: 4/5  L Hip Extension: 4/5  L Hip ABduction: 4/5  L Hip ADduction: 4/5  L Knee Flexion: 3+/5  L Knee Extension: 3+/5  L Ankle Dorsiflexion: 4+/5  L Ankle Plantar Flexion: 4+/5    Outcome Measures:  [facility] Basic Mobility  Turning from your back to your side while in a flat bed without using bedrails: None  Moving from lying on your back to sitting on the side of a flat bed without using bedrails: None  Moving to and from bed to chair (including a wheelchair): None  Standing up from a chair using your arms (e.g. wheelchair or bedside chair): None  To walk in hospital room: A little  Climbing 3-5 steps with railing: A lot  Basic Mobility - Total Score: 21    Goals:  Encounter Problems       Encounter Problems (Active)       PT Problem       Pt will ambulate 200 feet with FWW mod I .          Start:  01/06/24    Expected End:  01/13/24            Pt to demo 0-100 ROM of L Knee         Start:  01/06/24    Expected End:  01/13/24            Pt to demo 4 steps with  rails with sup        Start:  01/06/24    Expected End:  01/13/24            Patient will demonstrate independence in home program for support of progression        Start:  01/06/24    Expected End:  01/13/24               Pain - Adult            Education Documentation  Mobility Training, taught by Dano Palacio PT at 1/6/2024 12:50 PM.  Learner: Patient  Readiness: Acceptance  Method: Explanation  Response: Verbalizes Understanding    Education Comments  No comments found.

## 2024-01-06 NOTE — PROGRESS NOTES
Physical Therapy    Physical Therapy Treatment    Patient Name: Savi Govea  MRN: [id number]  Today's Date: 1/6/2024  Time Calculation  Start Time: 1043  Stop Time: 1132  Time Calculation (min): 49 min       Assessment/Plan   PT Assessment  End of Session Communication: Bedside nurse (Pt. voicing ready to be discharged post tx. with no further concerns for PT)  End of Session Patient Position: Up in chair     PT Plan  Treatment/Interventions: Bed mobility, Transfer training, Gait training, Stair training, Strengthening, Range of motion, Home exercise program  PT Plan: Skilled PT  PT Frequency: BID  General Visit Information:   PT  Visit  PT Received On: 01/06/24  General  Reason for Referral:  (Left TKA)  Prior to Session Communication: Bedside nurse  Patient Position Received: Up in chair    Subjective   Precautions:  Precautions  LE Weight Bearing Status: Weight Bearing as Tolerated (LLE)  Medical Precautions: Fall precautions      Objective   Pain:  Pain Assessment  Pain Assessment: CPOT (Critical Care Pain Observation Tool)  Pain Score: 4  Pain Type: Surgical pain  Pain Location: Knee  Pain Orientation: Left  Pain Descriptors: Aching, Burning  Pain Interventions:  (ice post tx.)    LLE   LLE :  (6-87 degree's of ROM)    Treatments:  Therapeutic Exercise  Therapeutic Exercise Performed: Yes (Pt. issued HEP with instructions and illustrations for supine, seated and standing exercises for TKR protocal with good return demonstration of education)    Bed Mobility  Bed Mobility:  (indepedent with bed mobs.)    Ambulation/Gait Training  Ambulation/Gait Training Performed: Yes (gait training with fww and mod Indep. performing slow recipricol gait 130-200' trials with progressively increased stride)  Transfers  Transfer:  (modified independent with all transfers)    Stairs  Stairs:  (up and down 5 steps with bilateral rails step to pattern then up/down 5 steps with education on use of one rail and spc with modified  independent.)    Outcome Measures:  [facility] Basic Mobility  Turning from your back to your side while in a flat bed without using bedrails: None  Moving from lying on your back to sitting on the side of a flat bed without using bedrails: None  Moving to and from bed to chair (including a wheelchair): None  Standing up from a chair using your arms (e.g. wheelchair or bedside chair): A little  To walk in hospital room: A little  Climbing 3-5 steps with railing: A little  Basic Mobility - Total Score: 21    EDUCATION:  Outpatient Education  Individual(s) Educated: Patient  Education Provided: Body Mechanics, Home Exercise Program, Posture  Patient/Caregiver Demonstrated Understanding: yes  Patient Response to Education: Patient/Caregiver Verbalized Understanding of Information, Patient/Caregiver Performed Return Demonstration of Exercises/Activities, Patient/Caregiver Asked Appropriate Questions    Encounter Problems       Encounter Problems (Active)       PT Problem       Pt will ambulate 200 feet with FWW mod I .    (Met)       Start:  01/06/24    Expected End:  01/13/24    Resolved:  01/06/24         Pt to demo 0-100 ROM of L Knee   (Progressing)       Start:  01/06/24    Expected End:  01/13/24            Pt to demo 4 steps with  rails with sup  (Met)       Start:  01/06/24    Expected End:  01/13/24    Resolved:  01/06/24         Patient will demonstrate independence in home program for support of progression (Progressing)       Start:  01/06/24    Expected End:  01/13/24         Goal Note       Patient will demonstrate independence in home program for support of progression                    Encounter Problems (Resolved)       Pain - Adult

## 2024-01-06 NOTE — PROGRESS NOTES
Occupational Therapy    Evaluation    Patient Name: Savi Govea  MRN: [id number]  Today's Date: 1/6/2024  Time Calculation  Start Time: 0901  Stop Time: 0919  Time Calculation (min): 18 min        Assessment:  OT Assessment: ADL impairment s/p L TKA  Prognosis: Good  Evaluation/Treatment Tolerance: Patient tolerated treatment well  End of Session Communication: Bedside nurse (Pt. voicing ready to be discharged post tx. with no further concerns for PT)  End of Session Patient Position: Up in chair  OT Assessment Results: Decreased ADL status  Prognosis: Good  Evaluation/Treatment Tolerance: Patient tolerated treatment well  Strengths: Ability to acquire knowledge  Plan:  Treatment Interventions: ADL retraining  OT Frequency: 2 times per week  OT Discharge Recommendations: Low intensity level of continued care  OT Recommended Transfer Status: Stand by assist  OT - OK to Discharge:  (Pt ok to d/c pending medical clearance by MD/NP)  Treatment Interventions: ADL retraining    Subjective   Current Problem:  1. Osteoarthritis, knee  Referral to Home Health      2. S/P reverse total shoulder arthroplasty, unspecified laterality        3. Unilateral primary osteoarthritis, left knee        4. Status post right knee replacement  docusate sodium (Colace) 100 mg capsule    rivaroxaban (Xarelto) 10 mg tablet    ondansetron ODT (Zofran-ODT) 4 mg disintegrating tablet    oxyCODONE (Roxicodone) 5 mg immediate release tablet    Referral to Home Health        General:  General  Reason for Referral: S/P  L TKA  Referred By: PT/YI Darling 1/5  Past Medical History Relevant to Rehab: HTN , OA, DM2, IBS UTI, R  TKA  Prior to Session Communication: Bedside nurse (cleared to see for therapy evals.)  Patient Position Received: Bed, 3 rail up, Alarm on  General Comment: pt is a 62 yo female came to the hospital on 1/5 for an elective L TKA  with Dr. Darling . pt currently WBAT L LE  Precautions:  LE Weight Bearing Status: Weight Bearing as  Tolerated (LLE)  Medical Precautions: Fall precautions  Vital Signs:     Pain:  Pain Assessment  Pain Assessment: 0-10  Pain Score: 6  Pain Type: Surgical pain  Pain Location: Knee  Pain Orientation: Left    Objective   Cognition:  Overall Cognitive Status: Within Functional Limits           Home Living:  Type of Home: House  Lives With: Alone  Home Adaptive Equipment: None  Home Layout: One level  Home Access: Ramped entrance  Bathroom Shower/Tub: Tub/shower unit  Bathroom Equipment: Grab bars in shower, Shower chair with back  Home Living Comments: pt will be going to [facility] for a few days 1 level  with 4 steps to enter with hand rail.  tub shower no equipment .  Prior Function:  Level of [place]: Independent with ADLs and functional transfers, Independent with homemaking with ambulation  ADL Assistance: Independent  Homemaking Assistance: Independent  Ambulatory Assistance: Independent  Prior Function Comments: per pt ind with all mobility adls and iadls . no falls still drives  IADL History:     ADL:  Eating Assistance: Independent  Eating Deficit: None  Grooming Assistance: Stand by  Grooming Deficit: Supervision/safety, Increased time to complete, Steadying, Standing with assistive device  Bathing Assistance: Minimal  Bathing Deficit: Left lower leg including foot, Right lower leg including foot, Buttocks, Perineal area  UE Dressing Assistance: Independent  LE Dressing Assistance: Minimal  LE Dressing Deficit: Don/doff R sock, Don/doff L sock, Increased time to complete, Requires assistive device for steadying  Toileting Assistance with Device: Stand by  Toileting Deficit: Increased time to complete, Supervison/safety, Verbal cueing, Steadying  Functional Assistance: Stand by  Functional Deficit: Toilet transfer, Increased time to complete, Supervision/safety, Verbal cueing  ADL Comments: Pt completes UB/LB dressing with SBA for safety and verbal cueing for proper sequencing.  Activity  Tolerance:  Endurance: Endurance does not limit participation in activity  Bed Mobility/Transfers: Bed Mobility  Bed Mobility: Yes  Bed Mobility 1  Bed Mobility 1: Supine to sitting, Scooting  Level of Assistance 1: Modified independent    Transfers  Transfer: Yes  Transfer 1  Transfer From 1:  (sit <> stand)  Technique 1: Sit to stand, Stand to sit  Transfer Device 1: Walker  Transfer Level of Assistance 1: Modified independent    Sitting Balance:  Static Sitting Balance  Static Sitting-Balance Support: Feet supported, No upper extremity supported  Static Sitting-Level of Assistance: Distant supervision  Dynamic Sitting Balance  Dynamic Sitting-Balance Support: Feet supported, No upper extremity supported  Dynamic Sitting-Balance: Forward lean  Standing Balance:  Static Standing Balance  Static Standing-Balance Support: Bilateral upper extremity supported  Static Standing-Level of Assistance: Close supervision     Strength:  Strength Comments: DEMIAN pimentel 4/5 strength    Coordination:  Movements are Fluid and Coordinated: Yes   Hand Function:     Extremities: RUE   RUE : Within Functional Limits and LUE   LUE: Within Functional Limits        Outcome Measures:[facility] Daily Activity  Putting on and taking off regular lower body clothing: A little  Bathing (including washing, rinsing, drying): A little  Putting on and taking off regular upper body clothing: None  Toileting, which includes using toilet, bedpan or urinal: A little  Taking care of personal grooming such as brushing teeth: A little  Eating Meals: None  Daily Activity - Total Score: 20        Education Documentation  Body Mechanics, taught by Margie Easley OT at 1/6/2024  1:51 PM.  Learner: Patient  Readiness: Acceptance  Method: Explanation  Response: Verbalizes Understanding, Needs Reinforcement    Precautions, taught by Margie Easley OT at 1/6/2024  1:51 PM.  Learner: Patient  Readiness: Acceptance  Method: Explanation  Response: Verbalizes  Understanding, Needs Reinforcement    ADL Training, taught by Margie Easley, OT at 1/6/2024  1:51 PM.  Learner: Patient  Readiness: Acceptance  Method: Explanation  Response: Verbalizes Understanding, Needs Reinforcement    Education Comments  No comments found.        OP EDUCATION:       Goals:  Encounter Problems       Encounter Problems (Active)       OT Goals       Patient will complete LB dressing with use of AE and set-up assist.  (Progressing)       Start:  01/06/24    Expected End:  01/20/24            Patient will transfer to bed/chair/toilet with Derrek (Progressing)       Start:  01/06/24    Expected End:  01/20/24            Patient will improve dynamic standing balance to complete grooming activity with Derrek.   (Progressing)       Start:  01/06/24    Expected End:  01/20/24

## 2024-01-08 ENCOUNTER — HOME CARE VISIT (OUTPATIENT)
Dept: HOME HEALTH SERVICES | Facility: HOME HEALTH | Age: 64
End: 2024-01-08
Payer: COMMERCIAL

## 2024-01-08 PROCEDURE — G0151 HHCP-SERV OF PT,EA 15 MIN: HCPCS

## 2024-01-08 PROCEDURE — 0023 HH SOC

## 2024-01-08 ASSESSMENT — ENCOUNTER SYMPTOMS
PAIN LOCATION - PAIN SEVERITY: 1/10
HIGHEST PAIN SEVERITY IN PAST 24 HOURS: 9/10
LOWEST PAIN SEVERITY IN PAST 24 HOURS: 0/10
SUBJECTIVE PAIN PROGRESSION: GRADUALLY IMPROVING
PAIN LOCATION: LEFT KNEE
PAIN: 1
PERSON REPORTING PAIN: PATIENT

## 2024-01-08 ASSESSMENT — ACTIVITIES OF DAILY LIVING (ADL)
OASIS_M1830: 05
ENTERING_EXITING_HOME: ONE PERSON

## 2024-01-09 ENCOUNTER — PATIENT OUTREACH (OUTPATIENT)
Dept: CARE COORDINATION | Facility: CLINIC | Age: 64
End: 2024-01-09
Payer: COMMERCIAL

## 2024-01-09 NOTE — PROGRESS NOTES
Outreach call to patient to support a smooth transition of care from recent admission.  Patient admitted with osteoarthrosis of knee. Spoke with patient, reviewed discharge medications, discharge instructions, assessed social needs, and provided education on importance of follow-up appointment with provider.Home care has come to visit patient.  Will continue to monitor through transition period.     Medications  Medications reviewed with patient/caregiver?: Yes (1/9/2024  9:54 AM)  Is the patient having any side effects they believe may be caused by any medication additions or changes?: No (1/9/2024  9:54 AM)  Does the patient have all medications ordered at discharge?: Yes (1/9/2024  9:54 AM)  Care Management Interventions: Provided patient education (1/9/2024  9:54 AM)  Is the patient taking all medications as directed (includes completed medication regime)?: Yes (1/9/2024  9:54 AM)  Care Management Interventions: Provided patient education (1/9/2024  9:54 AM)  Medication Comments: patient was asking about the process to ask for additional pain meds . Advised pateint to reach out ot surgeon's office to discuss more . Patient doesnt want to be without advised her to call prior to weekend. Patient had prior surgery in the past had to cut the pills she said. Patient is taking oxycodoen 5mg immediate release (1/9/2024  9:54 AM)    Appointments  Does the patient have a primary care provider?: Yes (1/9/2024  9:54 AM)  Has the patient kept scheduled appointments due by today?: Yes (1/9/2024  9:54 AM)    Self Management  Has home health visited the patient within 72 hours of discharge?: Yes (1/9/2024  9:54 AM)    Patient Teaching  Does the patient have access to their discharge instructions?: Yes (1/9/2024  9:54 AM)  Care Management Interventions: Reviewed instructions with patient (1/9/2024  9:54 AM)  What is the patient's perception of their health status since discharge?: Improving (1/9/2024  9:54 AM)          Jane  Stacy RN   Nurse Care Manager   [facility] Department   [phone]

## 2024-01-10 ENCOUNTER — TELEPHONE (OUTPATIENT)
Dept: ORTHOPEDIC SURGERY | Facility: CLINIC | Age: 64
End: 2024-01-10
Payer: COMMERCIAL

## 2024-01-10 DIAGNOSIS — G89.18 POST-OP PAIN: Primary | ICD-10-CM

## 2024-01-10 RX ORDER — OXYCODONE AND ACETAMINOPHEN 5; 325 MG/1; MG/1
1 TABLET ORAL EVERY 6 HOURS PRN
Qty: 28 TABLET | Refills: 0 | Status: SHIPPED | OUTPATIENT
Start: 2024-01-13 | End: 2024-01-20

## 2024-01-10 NOTE — TELEPHONE ENCOUNTER
Patient was called - A vm was left that she may resume her Meloxicam at this time and a refill for her pain medication will be sent to her pharmacy.

## 2024-01-12 ENCOUNTER — HOME CARE VISIT (OUTPATIENT)
Dept: HOME HEALTH SERVICES | Facility: HOME HEALTH | Age: 64
End: 2024-01-12
Payer: COMMERCIAL

## 2024-01-12 VITALS — DIASTOLIC BLOOD PRESSURE: 97 MMHG | HEART RATE: 88 BPM | OXYGEN SATURATION: 95 % | SYSTOLIC BLOOD PRESSURE: 132 MMHG

## 2024-01-12 PROCEDURE — G0157 HHC PT ASSISTANT EA 15: HCPCS

## 2024-01-12 SDOH — HEALTH STABILITY: PHYSICAL HEALTH
EXERCISE COMMENTS: COMPLETED SUPINE ANKLE PUMPS, QUAD SETTING, SAQ, SLR, HEEL SLIDES WITH INSTRUCTIONS FOR ALIGNMENT, PACING, BREATHING OUT WITH EXERTION AND USE OF AVAILABLE RANGE. 5X3 SETS , DEMONSTRATES ACTIVE SLR, QUICKLY DEVELOPING QUAD STRENGTH.

## 2024-01-12 ASSESSMENT — ENCOUNTER SYMPTOMS
HIGHEST PAIN SEVERITY IN PAST 24 HOURS: 4/10
PERSON REPORTING PAIN: PATIENT
PAIN LOCATION: LEFT KNEE
PAIN: 1
PAIN LOCATION - PAIN SEVERITY: 1/10

## 2024-01-15 ENCOUNTER — HOME CARE VISIT (OUTPATIENT)
Dept: HOME HEALTH SERVICES | Facility: HOME HEALTH | Age: 64
End: 2024-01-15
Payer: COMMERCIAL

## 2024-01-15 PROCEDURE — G0157 HHC PT ASSISTANT EA 15: HCPCS

## 2024-01-16 VITALS — DIASTOLIC BLOOD PRESSURE: 80 MMHG | SYSTOLIC BLOOD PRESSURE: 157 MMHG | HEART RATE: 83 BPM | OXYGEN SATURATION: 98 %

## 2024-01-16 SDOH — HEALTH STABILITY: PHYSICAL HEALTH: EXERCISE COMMENTS: ITH EXERTION, ECCENTRIC CONTROL 5X3 SETS. 20% VERBAL REINFORCEMENT FOR TECHNIQUE.

## 2024-01-16 SDOH — HEALTH STABILITY: PHYSICAL HEALTH
EXERCISE COMMENTS: STANDING HEEL RAISE, MINI-SQUATS, ALTERNATING KNEE FLEXION WITH INSTRUCTIONS/DEMONSTRATION FOR ALIGNMENT AND PACING, BREATHING OUT WITH EXERTION 8X2 SETS. SUPINE ANKLE PUMPS, QUAD SETTING, SAQ, HEEL SLIDES, SLR, WITH REINFORCEMENT FOR BREATHING OUT W

## 2024-01-16 ASSESSMENT — ENCOUNTER SYMPTOMS
PAIN: 1
PERSON REPORTING PAIN: PATIENT
HIGHEST PAIN SEVERITY IN PAST 24 HOURS: 7/10
PAIN LOCATION: LEFT KNEE
PAIN LOCATION - PAIN SEVERITY: 3/10

## 2024-01-18 ENCOUNTER — HOME CARE VISIT (OUTPATIENT)
Dept: HOME HEALTH SERVICES | Facility: HOME HEALTH | Age: 64
End: 2024-01-18
Payer: COMMERCIAL

## 2024-01-18 ENCOUNTER — PATIENT OUTREACH (OUTPATIENT)
Dept: CARE COORDINATION | Facility: CLINIC | Age: 64
End: 2024-01-18
Payer: COMMERCIAL

## 2024-01-18 VITALS — OXYGEN SATURATION: 97 % | SYSTOLIC BLOOD PRESSURE: 132 MMHG | HEART RATE: 80 BPM | DIASTOLIC BLOOD PRESSURE: 75 MMHG

## 2024-01-18 DIAGNOSIS — Z96.651 STATUS POST RIGHT KNEE REPLACEMENT: Primary | ICD-10-CM

## 2024-01-18 PROCEDURE — G0157 HHC PT ASSISTANT EA 15: HCPCS

## 2024-01-18 ASSESSMENT — ENCOUNTER SYMPTOMS
PERSON REPORTING PAIN: PATIENT
PAIN LOCATION - PAIN SEVERITY: 1/10
PAIN: 1
HIGHEST PAIN SEVERITY IN PAST 24 HOURS: 4/10
PAIN LOCATION: LEFT KNEE

## 2024-01-18 NOTE — PROGRESS NOTES
Outreach call to patient to patient . Hasn't had follow up with pcp but has orthopedic surgeon follow up scheduled for 1/25/2024. Is still active with [facility]  pt ot . Plans for discharge of that service in the next two weeks.  Patient is having less pain and is working hard with PT to increase strength and range of motion in RLE. Will continue to follow.  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     Jane Kumar , RN   Nurse Care Manager   [facility] Department   [phone]

## 2024-01-19 RX ORDER — OXYCODONE HYDROCHLORIDE 5 MG/1
5 TABLET ORAL EVERY 4 HOURS PRN
Qty: 40 TABLET | Refills: 0 | Status: SHIPPED | OUTPATIENT
Start: 2024-01-19 | End: 2024-01-26

## 2024-01-19 SDOH — HEALTH STABILITY: PHYSICAL HEALTH
EXERCISE COMMENTS: STRENGTH TRAINING WITH STANDING HEEL RAISE, MINI-SQUATS, HIP ABDUCTION, ALTERNATING KNEE FLEXION WITH REINFORCEMENT/DEMONSTRATION FOR ALIGNMENT AND PACING, BREATHING OUT WITH EXERTION 15 REPS.

## 2024-01-19 ASSESSMENT — ACTIVITIES OF DAILY LIVING (ADL): AMBULATION ASSISTANCE ON FLAT SURFACES: 1

## 2024-01-23 ENCOUNTER — HOME CARE VISIT (OUTPATIENT)
Dept: HOME HEALTH SERVICES | Facility: HOME HEALTH | Age: 64
End: 2024-01-23
Payer: COMMERCIAL

## 2024-01-23 PROCEDURE — G0151 HHCP-SERV OF PT,EA 15 MIN: HCPCS

## 2024-01-23 ASSESSMENT — ENCOUNTER SYMPTOMS
PERSON REPORTING PAIN: PATIENT
HIGHEST PAIN SEVERITY IN PAST 24 HOURS: 5/10
PAIN LOCATION - PAIN SEVERITY: 0/10
PAIN: 1
PAIN LOCATION: LEFT KNEE

## 2024-01-23 ASSESSMENT — ACTIVITIES OF DAILY LIVING (ADL)
AMBULATION ASSISTANCE ON FLAT SURFACES: 1
AMBULATION_DISTANCE/DURATION_TOLERATED: 150'
OASIS_M1830: 00
HOME_HEALTH_OASIS: 00

## 2024-01-25 ENCOUNTER — HOSPITAL ENCOUNTER (OUTPATIENT)
Dept: RADIOLOGY | Facility: CLINIC | Age: 64
Discharge: HOME | End: 2024-01-25
Payer: COMMERCIAL

## 2024-01-25 ENCOUNTER — OFFICE VISIT (OUTPATIENT)
Dept: ORTHOPEDIC SURGERY | Facility: CLINIC | Age: 64
End: 2024-01-25
Payer: COMMERCIAL

## 2024-01-25 DIAGNOSIS — Z47.1 AFTERCARE FOLLOWING LEFT KNEE JOINT REPLACEMENT SURGERY: ICD-10-CM

## 2024-01-25 DIAGNOSIS — Z96.652 AFTERCARE FOLLOWING LEFT KNEE JOINT REPLACEMENT SURGERY: ICD-10-CM

## 2024-01-25 PROCEDURE — 99024 POSTOP FOLLOW-UP VISIT: CPT | Performed by: ORTHOPAEDIC SURGERY

## 2024-01-25 PROCEDURE — 73562 X-RAY EXAM OF KNEE 3: CPT | Mod: LT

## 2024-01-25 PROCEDURE — 73562 X-RAY EXAM OF KNEE 3: CPT | Mod: LEFT SIDE | Performed by: ORTHOPAEDIC SURGERY

## 2024-01-25 PROCEDURE — 4010F ACE/ARB THERAPY RXD/TAKEN: CPT | Performed by: ORTHOPAEDIC SURGERY

## 2024-01-25 PROCEDURE — 1036F TOBACCO NON-USER: CPT | Performed by: ORTHOPAEDIC SURGERY

## 2024-01-25 NOTE — PROGRESS NOTES
History of Present Illness:  Patient returns today endorsing moderate pain.  Denies any numbness or tingling.  No calf pain, No chest pain or shortness of breath.    Physical Examination:  Mild swelling  Healthy incision, no erythema or drainage  ROM:  0-80  + Plantar/dorsiflexion  Negative Nicolas test    Imaging:  Appropriate position and alignment no evidence of implant failure     Assessment  Patient status post left total knee arthroplasty, doing well    Plan:  Discussed analgesics, encouraging non-opioid modalities.  Encouraged ice, rest.  Discussed importance of ROM/ formal physical therapy.  Reviewed dental prophylaxis.  Follow-up in 1 month for a motion check.    Niranjan Wesley PA-C      In a face to face encounter, I evaluated the patient and performed a physical examination, discussed pertinent diagnostic studies if indicated and discussed diagnosis and management strategies with both the patient and physician assistant / nurse practitioner.  I reviewed the PA/NP's note and agree with the documented findings and plan of care.    Patient returns today for evaluation of her knee she does have flexion limited to approximately 80 degrees.  We discussed she may be trending towards manipulation under anesthesia as she is behind on her motion.  Encouraged working on deep flexion, she starts formal physical therapy next week reviewed dedicated home exercise program.    Isaiah Shaw MD

## 2024-01-29 ENCOUNTER — EVALUATION (OUTPATIENT)
Dept: PHYSICAL THERAPY | Facility: CLINIC | Age: 64
End: 2024-01-29
Payer: COMMERCIAL

## 2024-01-29 DIAGNOSIS — M17.12 UNILATERAL PRIMARY OSTEOARTHRITIS, LEFT KNEE: Primary | ICD-10-CM

## 2024-01-29 PROCEDURE — 97110 THERAPEUTIC EXERCISES: CPT | Mod: GP | Performed by: PHYSICAL THERAPIST

## 2024-01-29 PROCEDURE — 97161 PT EVAL LOW COMPLEX 20 MIN: CPT | Mod: GP | Performed by: PHYSICAL THERAPIST

## 2024-01-29 ASSESSMENT — PAIN SCALES - GENERAL: PAINLEVEL_OUTOF10: 3

## 2024-01-29 ASSESSMENT — PAIN - FUNCTIONAL ASSESSMENT: PAIN_FUNCTIONAL_ASSESSMENT: 0-10

## 2024-01-29 NOTE — PROGRESS NOTES
PT Initial Evaluation    Patient Name:  Savi Govea    MRN:  [id number]    :  1960    Today's Date:  24    Informed Consent  Patient has been informed of all evaluation findings and treatment plans and agrees to participate in Physical Therapy services and plans as outlined.    Diagnosis:  Diagnosis and Precautions: L knee OA    Goals:   By the end of 12 visits patient will be able to do the following with < 1/10 L KNEE pain:    HEP:  Patient will consistently perform HER home exercise program for 20-30 minute sessions, 1-2x/day, 3-4 days/week independently by the end of 12 visits.    Basic ADL's:   Patient will perform bADL's/instrumental ADL's for 30 minutes moving between various closed kinetic chain postures.    ROM and Strength:  Patient will demonstrate 5/5 L KNEE strength in all planes and 0-120 degrees  L KNEE AROM in all planes to improve their ability to lift, stand, ambulate and perform basic ADL's.    Stair Negotiation:  Patient will be able to ambulate up/down stairs for 1-2 flights at a time.    Gait/Locomotion:  Patient will be able to ambulate for 30-60 minutes at a time.  Minimal to no gait deviation across level ground/stairs.  Patient will demonstrate no L KNEE pain with the following movements:  partial squat, lunge, forward/lateral step-up, HR, stepdown and SLS.    WORK:  Patient will return to WORK and perform normal WORK activities pain free.    Sleep:  Patient will sleep thru the night 4/7 nights/week.    Participation restrictions:  Increase LEFS to > or = to 60/80 for increased functional ability.    Pain:  Decrease pain at worst to < or = to 1/10 for improved QOL and ability to sleep.    No point tenderness noted over the L knee.     Plan of Care:      Treatment/Interventions: Cryotherapy, Education/ Instruction, Electrical stimulation, Gait training, Manual therapy, Neuromuscular re-education, Self care/ home management, Taping techniques, Therapeutic activities,  Therapeutic exercises, Vasopneumatic device  PT Plan: Skilled PT  PT Frequency:  (2 visits this week, 1 visit next week, 1 visit week after then 2x/week x 8 visits)  Duration: 12 visits     Certification Period Start Date: 01/29/24  Certification Period End Date: 02/07/24  Number of Treatments Authorized: 12  Rehab Potential: Good  Plan of Care Agreement: Patient    PT Assessment:    Patient is a 63 y.o. FEMALE with c/o L knee pain.   Patient is alert and oriented x 3.  Patient presents with medical diagnosis of L knee OA (24 days s/p L TKA by Dr. Shaw performed on 1/5/2024) contributing to compensatory soft tissue dysfunction, pain, stiffness and weakness of the L knee.   Significant past medical history/past surgical history includes see above.    Skilled care is needed to progress the patient back to these activities without exacerbating symptoms.   Patient requires skilled PT services to address the problems identified and the individualized patient's goals as outlined in the problems and goals section of this evaluation.  A skilled PT is required to address these key impairments and to provide and progress with an appropriate home exercise program. Patient does not have any significant PMH influencing Rx and reports motivation to return to FUNCTIONAL ACTIVITY and RETURN TO WORK.   Patient demonstrates to be a good candidate for physical therapy with good rehab potential and verbalized a good understanding of HER diagnosis, prognosis and treatment.  Goals have been established and reviewed with the patient.      PT Assessment Results: Decreased range of motion, Decreased strength, Decreased endurance, Impaired balance, Decreased mobility, Orthopedic restrictions, Pain  Rehab Prognosis: Good  Evaluation/Treatment Tolerance: Patient limited by fatigue, Patient limited by pain    Complexity:  Low complexity evaluation  due to a 15 minute duration, a past medical history WITHOUT any personal factors and/or  comorbidities that could impact the POC, examination of body systems completed on one to two elements, the patient presents with a stable condition, and clinical decision making using the LEFS was of low complexity.     Prognosis:  Rehab Prognosis: Good    Problem List  Activity Limitations, Decreased Functional Level, Decreased knowledge of HEP, Gait issues, Pain, Range of Motion/joint mobility issues, Strength, and Endurance    Impairments   IMPAIRED ROM LOWER BODY, IMPAIRED STRENGTH LOWER BODY, IMPAIRED GAIT, INCREASED PAIN, IMPAIRED FUNCTIONAL ACTIVITY LEVEL, and IMPAIRED FUNCTIONAL MOVEMENT PATTERNS    Functional Limitations:  LIMITATIONS PERFORMING BASIC ADL'S, ISSUES WITH SLEEP, WORK ISSUES, PARTICIPATION IN HOBBIES, PARTICIPATION IN LEISURE ACTIVITIES, and PARTICIPATION IN HOME MANAGEMENT    General Visit Information:  Reason for Referral: PT Evaluate and Treat  Referred By: Dr. Isaiah Shaw  General Comment: Procedure Laterality Anesthesia  OPEN L TOTAL KNEE ARTHROPLASTY Dr. Isaiah Shaw 1/5/2024, L knee OA    Pre-Cautions:  KITA Fall Risk Score (The score of 4 or more indicates an increased risk of falling): 0  LE Weight Bearing Status: Weight Bearing as Tolerated  Medical Precautions:  (follow TKA protocol))     Protocol:                     Weight Bearing      All primary knees:  weight bearing as tolerated (WBAT) for 4 weeks with assistance.    Progression to a Cane     3 - 4 weeks (able to lift and hold leg off bed/table 3 - 5 seconds)    Exercises     Active range of motion (AROM) flexion and extension:  preferred/encouraged post-op   Passive range of motion (PROM) flexion and extension:  OK post-op if gentle!   Active-assisted range of motion (AAROM) flexion and extenstion:  OK post-op if gentle!   Isometric Quadriceps exercises:  OK post-op   Passive stretch:  OK post-op gently!   Active-assisted stretching (Contract/Relax):  OK post-op gently!   Isotonic Hamstrings (limit 10 lbs.):  3 - 4  weeks   Isotonic Quads (limit 10 - 15 lbs.):  3 - 4 weeks   Stationary Bike:  3 - 4 weeks when able   Hamstring isometrics may start at 4 weeks   Closed chain exercises may start at 4 - 5 weeks    Additional Information     NO! pillows within 6 inches of the popliteal space/crease - NO!   NO! patellar mobilization NO!   May roll to the operative side   Gentle massage to decrease edema, hypersensitivity, and pain is OK   Scar mobilization: OK at 6 - 8 weeks   Neuro-muscular electrical stimulation to quads: after 4 weeks if indicated   CPM:  only used with contractures, post manipulation with epidural    Recreational Activities     Golf:   6 - 8 weeks (putt/chip/short irons)    12 weeks (long irons/woods)      Swimmin - 6 weeks      Bike:   stationary 3 - 4 weeks with high seat    Regular bike outdoors 12 weeks     [place] weeks     Driving Car:  1st follow-up visit, ~ 6 weeks    ADDITIONAL INFORMATION:    Please send copies of therapy notes.    Physician's Signature:                    Isaiah Shaw MD    Reason for Visit:  PT Evaluate and Treat    Initial Evaluation:  Referred By: Dr. Isaiah Shaw    Insurance  Insurance reviewed  Name of Insurance:  [facility]  Visit No.  1    Subjective:    Current Episode  Date of Onset:  20 years ago  Mechanism of injury:  Patient reports having L knee pain for a long time, over 20 years and has been progressively getting worse over the past few years.    Pain Score:  Pain Assessment: 0-10  Pain Assessment  Pain Assessment: 0-10  Pain Score: 3  Pain Type: Chronic pain  Pain Location: Knee  Pain Orientation: Left  Pain Type: Chronic pain  Pain Location: Knee  Pain Orientation: Left     Better with:  ice, meds    Worse with:  prolonged sitting/standing/walking, stairs, in/out of car/shower/bed    Medical History/Surgical History:  Medical Precautions:  (follow TKA protocol)    Reviewed medical history form with patient (medications/allergies reviewed with  patient).  Current Outpatient Medications   Medication Instructions    alpha lipoic acid 300 mg capsule 1 capsule, oral, 2 times daily    ammonium lactate (Lac-Hydrin) 12 % lotion Topical, Daily PRN, As directed     APPLE CIDER VINEGAR ORAL 2 capsules, oral, 2 times daily    ascorbic acid (VITAMIN C) 1,000 mg, oral, Daily    calcium citrate-vitamin D3 200 mg-3.125 mcg (125 unit) tablet 1 tablet, oral, Daily    CHROMIUM PICOLINATE ORAL 1 tablet, oral, Daily    cinnamon (CINNAMON) 500 mg, oral, 2 times daily    docusate sodium (COLACE) 100 mg, oral, 2 times daily    fluticasone (Flonase) 50 mcg/actuation nasal spray 1 spray, Each Nostril, Daily PRN    glimepiride (AMARYL) 4 mg, oral, 2 times daily    irbesartan (AVAPRO) 300 mg, oral, Daily    Januvia 100 mg tablet Take 1 tablet (100 mg) by mouth once daily.    Jardiance 25 mg, oral, Daily    loratadine (CLARITIN) 10 mg, oral, Daily    magnesium oxide 400 mg magnesium capsule 1 capsule, oral, 2 times daily    meloxicam (MOBIC) 15 mg, oral, Daily    metFORMIN (GLUCOPHAGE) 1,000 mg, oral, 2 times daily with meals    metoprolol succinate XL (KAPSPARGO SPRINKLE) 100 mg, oral, Daily    multivitamin tablet 1 tablet, oral, Daily RT    ondansetron ODT (ZOFRAN-ODT) 4 mg, oral, Every 8 hours PRN    OneTouch Delica Plus Lancet 30 gauge misc     OneTouch Verio test strips strip     pomegranate fruit extract 250 mg capsule 1 capsule, oral, 2 times daily    potassium citrate 99 mg, oral, Daily RT    rivaroxaban (XARELTO) 10 mg, oral, Daily    saccharomyces boulardii (FLORASTOR) 250 mg, oral, Daily    Soma 350 mg, oral, Daily    zinc gluconate 50 mg tablet 1 tablet, oral, Daily       Radiology:  Study Result    Narrative & Impression   Interpreted By:  Jhon Castro,   STUDY:  XR KNEE LEFT 3 VIEWS;  ;  1/25/2024 1:25 pm      INDICATION:  Signs/Symptoms:S/P TKR.      ACCESSION NUMBER(S):  [id number]      ORDERING CLINICIAN:  JHON CASTRO      FINDINGS:  Left knee x-rays AP lateral  Merchant view: Patient with evidence of  cemented total knee arthroplasty appropriate position alignment no  evidence of lucency failure or fracture.     Functional Assessment:  Level of [place]:  Level of [place]: Independent with ADLs and functional transfers    Functional Limitations:  prolonged sitting/standing/walking, stairs, in/out of car/shower/bed    Work Status:  EMPLOYED, , not working currently  Patient Awareness:  Patient is aware of HER diagnosis and prognosis.  Social Support/History:  LIVES ALONE    Objective:  Restrictions as per MD's Protocol if surgical:  see protocol above    Weightbearing Status:  WBAT L LE with walker assist    Skin:  L knee surgical TKA incision healing and closed, no active signs of infection    Palpation:   no point tenderness over L knee    Sensation:  Patient denies numbness/tingling of bilateral LOWER extremities.    Gait:  Minor antalgic gait L LE with walker assist    ROM:  AROM  L knee -3 degrees-70 degrees (83 degrees after 2 exercises), R knee AROM 0-115 degrees  R hip AROM WNL's, L hip AROM WNL's, R ankle/foot AROM WNL's, and L ankle/foot AROM WNL's    Strength:    L knee 4-/5 flexion/extension each, L hip flexion 4/5  R knee 5/5 all planes, R hip 5/5 all planes, R ankle/foot 5/5 all planes, and L ankle/foot 5/5 all planes    Outcome measure  Lower Extremity Funtional Score (LEFS): 32/80      Treatment  Time in clinic started at  5:30pm  Time in clinic ended at  6:10pm  Total time in clinic is . 40 minutes  Total timed code time is  35 minutes    Treatment Performed Today:.   PT Initial Evaluation and Therapeutic Exercise  Individual(s) Educated: Patient     Diagnosis and Precautions: L knee OA  Patient complains of increased lower back pain lying supine    Therapeutic Exercise:  Standing lunge stretch at step:  2 sets, 10 reps, 10 second hold  Seated L knee flexion stretch x 15 reps

## 2024-01-30 ENCOUNTER — TELEPHONE (OUTPATIENT)
Dept: ORTHOPEDIC SURGERY | Facility: CLINIC | Age: 64
End: 2024-01-30
Payer: COMMERCIAL

## 2024-01-30 NOTE — TELEPHONE ENCOUNTER
Pt called and said she is having dark bloody urine, and intermittent bloody noses since she has been on the eliquis from her TKR,I called and talked to Niranjan to ask if she could spot the eliquis, he said to stop it immediately , and if symptoms continue with  the bloody nose and urinary symptoms , then she need to go to the er. I called pt back and let her know of Niranjan's recommendations, pt expressed understanding

## 2024-01-31 ENCOUNTER — TREATMENT (OUTPATIENT)
Dept: PHYSICAL THERAPY | Facility: CLINIC | Age: 64
End: 2024-01-31
Payer: COMMERCIAL

## 2024-01-31 DIAGNOSIS — M17.12 UNILATERAL PRIMARY OSTEOARTHRITIS, LEFT KNEE: ICD-10-CM

## 2024-01-31 PROCEDURE — 97110 THERAPEUTIC EXERCISES: CPT | Mod: GP | Performed by: PHYSICAL THERAPIST

## 2024-01-31 ASSESSMENT — PAIN - FUNCTIONAL ASSESSMENT: PAIN_FUNCTIONAL_ASSESSMENT: 0-10

## 2024-01-31 NOTE — PROGRESS NOTES
PHYSICAL THERAPY TREATMENT    Patient name:  Savi Govea    MRN:  [id number]    :  1960    Today's Date:  24    Referral by:  Referred By: Dr. Isaiah Shaw    Diagnoses:  Diagnosis and Precautions: L knee OA    Assessment/Plan:  Therapeutic exercise performed in order to improve L knee strength/ROM/mobility.  Patient requires increased tactile/verbal cues with exercise in order to reduce L knee stress/strain during the particular exercise performed.  Patient challenged with exercises performed in clinic secondary to L knee fatigue.   PT Assessment  PT Assessment Results: Decreased range of motion, Decreased strength, Decreased endurance, Impaired balance, Decreased mobility, Orthopedic restrictions, Pain  Rehab Prognosis: Good  Evaluation/Treatment Tolerance: Patient limited by fatigue, Patient limited by pain  OP PT Plan  PT Plan: Skilled PT  Rehab Potential: Good  Plan of Care Agreement: Patient    General Visit Information  PT  Visit  PT Received On: 24    General  Reason for Referral: PT Evaluate and Treat  Referred By: Dr. Isaiah Shaw  General Comment: Procedure Laterality Anesthesia  OPEN L TOTAL KNEE ARTHROPLASTY Dr. Isaiah Shaw 2024, L knee OA    Insurance  Insurance reviewed  Name of Insurance:  [facility]  Visit No.  2  LEFT TKA M17.12 ; 10% COINSUR / $1600 DED IS MET FOR  / $2000 OOP IS MET FOR / POLICY WILL % AFTER DED & OOP MET [id number]     Subjective:  Patient reports that her L knee is stiff today.    Precautions  STEADI Fall Risk Score (The score of 4 or more indicates an increased risk of falling): 0  LE Weight Bearing Status: Weight Bearing as Tolerated  Medical Precautions:  (follow TKA protocol)  Precautions Comment: Procedure Laterality Anesthesia OPEN L TOTAL KNEE ARTHROPLASTY Dr. Isaiah Shaw 2024, L knee OA    Pain:  Pain Assessment  Pain Assessment: 0-10  Pain Type: Chronic pain, Surgical pain  Pain Location: Knee  Pain  Orientation: Left    ROM  L knee flexion AROM in supine 91 degrees after stretching    Treatments    Therapeutic Exercise  Therapeutic Exercise Performed: Yes  Therapeutic Exercise Activity 1: Bike x 8 minutes,rocking  Therapeutic Exercise Activity 2: Lunge stretch on step 3 sets, 10 reps  Therapeutic Exercise Activity 3: Mini-Squats x 30 reps  Therapeutic Exercise Activity 4: Forward step-ups 4 inch, x 20 reps  Therapeutic Exercise Activity 5: Lateral step-ups 4 inch step, x 20 reps    Treatment  Time in clinic started at  4:15pm  Time in clinic ended at  4:55pm  Total time in clinic is .  40 minutes  Total timed code time is  38 minutes    Treatment Performed Today:.   Therapeutic Exercise x 3 units

## 2024-02-07 ENCOUNTER — PATIENT OUTREACH (OUTPATIENT)
Dept: CARE COORDINATION | Facility: CLINIC | Age: 64
End: 2024-02-07
Payer: COMMERCIAL

## 2024-02-07 ENCOUNTER — TREATMENT (OUTPATIENT)
Dept: PHYSICAL THERAPY | Facility: CLINIC | Age: 64
End: 2024-02-07
Payer: COMMERCIAL

## 2024-02-07 DIAGNOSIS — M17.12 UNILATERAL PRIMARY OSTEOARTHRITIS, LEFT KNEE: Primary | ICD-10-CM

## 2024-02-07 PROCEDURE — 97110 THERAPEUTIC EXERCISES: CPT | Mod: GP | Performed by: PHYSICAL THERAPIST

## 2024-02-07 ASSESSMENT — PAIN - FUNCTIONAL ASSESSMENT: PAIN_FUNCTIONAL_ASSESSMENT: 0-10

## 2024-02-07 NOTE — PROGRESS NOTES
PHYSICAL THERAPY TREATMENT    Patient name:  Savi Govea    MRN:  [id number]    :  1960    Today's Date:  24    Referral by:  Referred By: Dr. Isaiah Shaw    Diagnoses:  Diagnosis and Precautions: L knee OA    Assessment/Plan:  Therapeutic exercise performed in order to improve L knee strength/ROM/mobility.  Patient requires increased tactile/verbal cues with exercise in order to reduce L knee stress/strain during the particular exercise performed.  Patient challenged with exercises performed in clinic secondary to L knee fatigue.   PT Assessment  PT Assessment Results: Decreased range of motion, Decreased strength, Decreased endurance, Impaired balance, Decreased mobility, Orthopedic restrictions, Pain  Rehab Prognosis: Good  Evaluation/Treatment Tolerance: Patient limited by fatigue, Patient limited by pain  OP PT Plan  PT Plan: Skilled PT  Rehab Potential: Good  Plan of Care Agreement: Patient    General Visit Information  PT  Visit  PT Received On: 24    General  Reason for Referral: PT Evaluate and Treat  Referred By: Dr. Isaiah Shaw  General Comment: Procedure Laterality Anesthesia  OPEN L TOTAL KNEE ARTHROPLASTY Dr. Isaiah Shaw 2024, L knee OA    Insurance  Insurance reviewed  Name of Insurance:  [facility]  Visit No.  3  LEFT TKA M17.12 ; 10% COINSUR / $1600 DED IS MET FOR  / $2000 OOP IS MET FOR / POLICY WILL % AFTER DED & OOP MET [id number]     Subjective:  Patient reports that her L knee is a little sore and stiff today.    Precautions  STEADI Fall Risk Score (The score of 4 or more indicates an increased risk of falling): 0  LE Weight Bearing Status: Weight Bearing as Tolerated  Medical Precautions:  (follow TKA protocol)  Precautions Comment: Procedure Laterality Anesthesia OPEN L TOTAL KNEE ARTHROPLASTY Dr. Isaiah Shaw 2024, L knee OA    Pain:  Pain Assessment  Pain Assessment: 0-10  Pain Type: Chronic pain, Surgical pain  Pain Location:  Knee  Pain Orientation: Left    ROM  L knee flexion AROM in supine 100 degrees after stretching    Treatments    Therapeutic Exercise  Therapeutic Exercise Performed: Yes  Therapeutic Exercise Activity 1: Bike x 8 minutes,rocking  Therapeutic Exercise Activity 2: Lunge stretch on step 3 sets, 10 reps  Therapeutic Exercise Activity 3: Mini-Squats x 30 reps  Therapeutic Exercise Activity 4: Forward step-ups 4 inch, x 20 reps  Therapeutic Exercise Activity 5: Lateral step-ups 4 inch step, x 20 reps  Therapeutic Exercise Activity 6: Standing HR/TR x 30 reps each  Therapeutic Exercise Activity 7: PROM L knee flexion in sitting x 10 minutes    Treatment  Time in clinic started at  4:57pm  Time in clinic ended at  5:35pm  Total time in clinic is .   38 minutes  Total timed code time is  38 minutes    Treatment Performed Today:.   Therapeutic Exercise x 3 units

## 2024-02-14 ENCOUNTER — TREATMENT (OUTPATIENT)
Dept: PHYSICAL THERAPY | Facility: CLINIC | Age: 64
End: 2024-02-14
Payer: COMMERCIAL

## 2024-02-14 DIAGNOSIS — M17.12 UNILATERAL PRIMARY OSTEOARTHRITIS, LEFT KNEE: ICD-10-CM

## 2024-02-14 PROCEDURE — 97110 THERAPEUTIC EXERCISES: CPT | Mod: GP | Performed by: PHYSICAL THERAPIST

## 2024-02-14 ASSESSMENT — PAIN - FUNCTIONAL ASSESSMENT: PAIN_FUNCTIONAL_ASSESSMENT: 0-10

## 2024-02-14 NOTE — PROGRESS NOTES
PHYSICAL THERAPY TREATMENT    Patient name:  Savi Govea    MRN:  [id number]    :  1960    Today's Date:  24    Time Calculation  Start Time: 1613  Stop Time: 1655  Time Calculation (min): 42 min     PT Therapeutic Procedures Time Entry  Therapeutic Exercise Time Entry: 38      Referral by:  Referred By: Dr. Isaiah Shaw    Diagnoses:  Diagnosis and Precautions: L knee OA    Assessment/Plan:  Therapeutic exercise performed in order to improve L knee strength/ROM/mobility.  Patient requires increased tactile/verbal cues with exercise in order to reduce L knee stress/strain during the particular exercise performed.  Patient challenged with exercises performed in clinic secondary to L knee fatigue.   PT Assessment  PT Assessment Results: Decreased range of motion, Decreased strength, Decreased endurance, Impaired balance, Decreased mobility, Orthopedic restrictions, Pain  Rehab Prognosis: Good  Evaluation/Treatment Tolerance: Patient limited by fatigue, Patient limited by pain  OP PT Plan  PT Plan: Skilled PT  Rehab Potential: Good  Plan of Care Agreement: Patient    General Visit Information  PT  Visit  PT Received On: 24    General  Reason for Referral: PT Evaluate and Treat  Referred By: Dr. Isaiah Shaw  General Comment: Procedure Laterality Anesthesia  OPEN L TOTAL KNEE ARTHROPLASTY Dr. Isaiah Shaw 2024, L knee OA    Insurance  Insurance reviewed  Name of Insurance:  [facility]  Visit No.  4  LEFT TKA M17.12 ; 10% COINSUR / $1600 DED IS MET FOR  / $2000 OOP IS MET FOR / POLICY WILL % AFTER DED & OOP MET [id number]     Subjective:  Patient reports that her L knee is a little sore and stiff today.    Precautions  STEADI Fall Risk Score (The score of 4 or more indicates an increased risk of falling): 0  LE Weight Bearing Status: Weight Bearing as Tolerated  Medical Precautions:  (follow TKA protocol)  Precautions Comment: Procedure Laterality Anesthesia OPEN L  TOTAL KNEE ARTHROPLASTY Dr. Isaiah Shaw 1/5/2024, L knee OA    Pain:  Pain Assessment  Pain Assessment: 0-10  Pain Location: Knee  Pain Orientation: Left    ROM  L knee flexion AROM in supine 109 degrees after stretching    Treatments    Therapeutic Exercise  Therapeutic Exercise Performed: Yes  Therapeutic Exercise Activity 1: Bike x 8 minutes,rocking  Therapeutic Exercise Activity 2: Lunge stretch on step 3 sets, 10 reps  Therapeutic Exercise Activity 3: Mini-Squats x 30 reps  Therapeutic Exercise Activity 4: Forward step-ups 4 inch, x 20 reps  Therapeutic Exercise Activity 5: Lateral step-ups 4 inch step, x 20 reps  Therapeutic Exercise Activity 6: Standing HR/TR x 30 reps each  Therapeutic Exercise Activity 7: PROM L knee flexion in sitting x 15 minutes    Treatment  Time in clinic started at  4:13pm  Time in clinic ended at  4:55pm  Total time in clinic is .    42 minutes  Total timed code time is  38 minutes    Treatment Performed Today:.   Therapeutic Exercise x 3 units

## 2024-02-20 ENCOUNTER — TREATMENT (OUTPATIENT)
Dept: PHYSICAL THERAPY | Facility: CLINIC | Age: 64
End: 2024-02-20
Payer: COMMERCIAL

## 2024-02-20 DIAGNOSIS — M17.12 UNILATERAL PRIMARY OSTEOARTHRITIS, LEFT KNEE: ICD-10-CM

## 2024-02-20 PROCEDURE — 97110 THERAPEUTIC EXERCISES: CPT | Mod: GP | Performed by: PHYSICAL THERAPIST

## 2024-02-20 ASSESSMENT — PAIN - FUNCTIONAL ASSESSMENT: PAIN_FUNCTIONAL_ASSESSMENT: 0-10

## 2024-02-20 ASSESSMENT — PAIN SCALES - GENERAL: PAINLEVEL_OUTOF10: 2

## 2024-02-20 NOTE — PROGRESS NOTES
PHYSICAL THERAPY TREATMENT    Patient name:  Savi Govea    MRN:  [id number]    :  1960    Today's Date:  24    Time Calculation  Start Time: 1213  Stop Time: 1254  Time Calculation (min): 41 min     PT Therapeutic Procedures Time Entry  Therapeutic Exercise Time Entry: 38      Referral by:  Referred By: Dr. Isaiah Shaw    Diagnoses:  Diagnosis and Precautions: L knee OA    Assessment/Plan:  Therapeutic exercise performed in order to improve L knee strength/ROM/mobility.  Patient requires increased tactile/verbal cues with exercise in order to reduce L knee stress/strain during the particular exercise performed.  Patient challenged with exercises performed in clinic secondary to L knee fatigue.   PT Assessment  PT Assessment Results: Decreased range of motion, Decreased strength, Decreased endurance, Impaired balance, Decreased mobility, Orthopedic restrictions, Pain  Rehab Prognosis: Good  Evaluation/Treatment Tolerance: Patient limited by fatigue, Patient limited by pain  OP PT Plan  PT Plan: Skilled PT  Rehab Potential: Good  Plan of Care Agreement: Patient    General Visit Information  PT  Visit  PT Received On: 24    General  Reason for Referral: PT Evaluate and Treat  Referred By: Dr. Isaiah Shaw  General Comment: Procedure Laterality Anesthesia  OPEN L TOTAL KNEE ARTHROPLASTY Dr. Isaiah Shaw 2024, L knee OA    Insurance  Insurance reviewed  Name of Insurance:  [facility]  Visit No.  5  LEFT TKA M17.12 ; 10% COINSUR / $1600 DED IS MET FOR  / $2000 OOP IS MET FOR / POLICY WILL % AFTER DED & OOP MET [id number]     Subjective:  Patient reports that her L knee is a little sore and stiff today.    Precautions  STEADI Fall Risk Score (The score of 4 or more indicates an increased risk of falling): 0  LE Weight Bearing Status: Weight Bearing as Tolerated  Medical Precautions:  (follow TKA protocol)  Precautions Comment: Procedure Laterality Anesthesia OPEN L  TOTAL KNEE ARTHROPLASTY Dr. Isaiah Shaw 1/5/2024, L knee OA    Pain:  Pain Assessment  Pain Assessment: 0-10  Pain Score: 2  Pain Location: Knee  Pain Orientation: Left    ROM  L knee flexion AROM in supine 105 degrees after stretching    Treatments    Therapeutic Exercise  Therapeutic Exercise Performed: Yes  Therapeutic Exercise Activity 1: Bike x 8 minutes,rocking  Therapeutic Exercise Activity 2: Lunge stretch on step 3 sets, 10 reps  Therapeutic Exercise Activity 3: Mini-Squats x 30 reps  Therapeutic Exercise Activity 4: Forward step-ups 6 inch, x 30 reps  Therapeutic Exercise Activity 5: Lateral step-ups 6 inch step, x 30 reps  Therapeutic Exercise Activity 6: Standing HR/TR x 30 reps each  Therapeutic Exercise Activity 7: Cybex Leg Press x30 reps, 2 plates  Therapeutic Exercise Activity 8: Cable column TKE's 4 plates x 30 reps  Therapeutic Exercise Activity 9: PROM L knee flexion in sitting x 12 minutes    Treatment  Time in clinic started at  12:13pm  Time in clinic ended at 12:54pm  Total time in clinic is .     41 minutes  Total timed code time is  38 minutes    Treatment Performed Today:.   Therapeutic Exercise x 3 units

## 2024-02-22 ENCOUNTER — OFFICE VISIT (OUTPATIENT)
Dept: ORTHOPEDIC SURGERY | Facility: CLINIC | Age: 64
End: 2024-02-22
Payer: COMMERCIAL

## 2024-02-22 ENCOUNTER — TREATMENT (OUTPATIENT)
Dept: PHYSICAL THERAPY | Facility: CLINIC | Age: 64
End: 2024-02-22
Payer: COMMERCIAL

## 2024-02-22 DIAGNOSIS — G89.18 POST-OP PAIN: Primary | ICD-10-CM

## 2024-02-22 DIAGNOSIS — M17.12 UNILATERAL PRIMARY OSTEOARTHRITIS, LEFT KNEE: ICD-10-CM

## 2024-02-22 PROCEDURE — 4010F ACE/ARB THERAPY RXD/TAKEN: CPT | Performed by: STUDENT IN AN ORGANIZED HEALTH CARE EDUCATION/TRAINING PROGRAM

## 2024-02-22 PROCEDURE — 1036F TOBACCO NON-USER: CPT | Performed by: STUDENT IN AN ORGANIZED HEALTH CARE EDUCATION/TRAINING PROGRAM

## 2024-02-22 PROCEDURE — 97110 THERAPEUTIC EXERCISES: CPT | Mod: GP | Performed by: PHYSICAL THERAPIST

## 2024-02-22 PROCEDURE — 99024 POSTOP FOLLOW-UP VISIT: CPT | Performed by: STUDENT IN AN ORGANIZED HEALTH CARE EDUCATION/TRAINING PROGRAM

## 2024-02-22 ASSESSMENT — PAIN - FUNCTIONAL ASSESSMENT: PAIN_FUNCTIONAL_ASSESSMENT: 0-10

## 2024-02-22 ASSESSMENT — PAIN SCALES - GENERAL: PAINLEVEL_OUTOF10: 1

## 2024-02-22 NOTE — PROGRESS NOTES
History of Present Illness:  Patient returns today endorsing mild pain.  Patient notes improving functional status.    Physical Examination:  Well healed incision, there is a small area inferiorly that has some delayed healing , about 2x3mm with some eschar noted  ROM: 0-110  No laxity to varus / valgus stress  + Plantar/dorsiflexion  Negative Nicolas test    Assessment:  Patient status post left total knee arthroplasty, doing well    Plan:  Discussed analgesics.  Reviewed range of motion, strength goals.  Discussed activities to avoid  Dental prophylaxis discussed.  Follow-up:  after her trip to [facility] [place] PA-C        In a face to face encounter, I evaluated the patient and performed a physical examination, discussed pertinent diagnostic studies if indicated and discussed diagnosis and management strategies with both the patient and physician assistant / nurse practitioner.  I reviewed the PA/NP's note and agree with the documented findings and plan of care.        Isaiah Shaw MD

## 2024-02-22 NOTE — PROGRESS NOTES
PHYSICAL THERAPY TREATMENT    Patient name:  Savi Govea    MRN:  [id number]    :  1960    Today's Date:  24    Time Calculation  Start Time:   Stop Time:   Time Calculation (min): 42 min     PT Therapeutic Procedures Time Entry  Therapeutic Exercise Time Entry: 38      Referral by:  Referred By: Dr. Isaiah Shaw    Diagnoses:  Diagnosis and Precautions: L knee OA    Assessment/Plan:  Therapeutic exercise performed in order to improve L knee strength/ROM/mobility.  Patient requires increased tactile/verbal cues with exercise in order to reduce L knee stress/strain during the particular exercise performed.  Patient challenged with exercises performed in clinic secondary to L knee fatigue.   PT Assessment  PT Assessment Results: Decreased range of motion, Decreased strength, Decreased endurance, Impaired balance, Decreased mobility, Orthopedic restrictions, Pain  Rehab Prognosis: Good  Evaluation/Treatment Tolerance: Patient limited by fatigue, Patient limited by pain  OP PT Plan  PT Plan: Skilled PT  Rehab Potential: Good  Plan of Care Agreement: Patient    General Visit Information  PT  Visit  PT Received On: 24    General  Reason for Referral: PT Evaluate and Treat  Referred By: Dr. Isaiah Shaw  General Comment: Procedure Laterality Anesthesia  OPEN L TOTAL KNEE ARTHROPLASTY Dr. Isaiah Shaw 2024, L knee OA    Insurance  Insurance reviewed  Name of Insurance:  [facility]  Visit No.  6  LEFT TKA M17.12 ; 10% COINSUR / $1600 DED IS MET FOR  / $2000 OOP IS MET FOR / POLICY WILL % AFTER DED & OOP MET [id number]     Subjective:  Patient reports that she saw Dr. Shaw earlier today and that he's pleased with her progress thus far.    Precautions  STEADI Fall Risk Score (The score of 4 or more indicates an increased risk of falling): 0  LE Weight Bearing Status: Weight Bearing as Tolerated  Medical Precautions:  (follow TKA protocol)  Precautions Comment:  Procedure Laterality Anesthesia OPEN L TOTAL KNEE ARTHROPLASTY Dr. Isaiah Shaw 1/5/2024, L knee OA    Pain:  Pain Assessment  Pain Assessment: 0-10  Pain Score: 1  Pain Location: Knee  Pain Orientation: Left    ROM  L knee flexion AROM in supine 108 degrees after stretching    Treatments    Therapeutic Exercise  Therapeutic Exercise Performed: Yes  Therapeutic Exercise Activity 1: Bike x 8 minutes,rocking  Therapeutic Exercise Activity 2: Lunge stretch on step 3 sets, 10 reps  Therapeutic Exercise Activity 3: Mini-Squats x 30 reps  Therapeutic Exercise Activity 4: Forward step-ups 6 inch, x 30 reps  Therapeutic Exercise Activity 5: Lateral step-ups 6 inch step, x 30 reps  Therapeutic Exercise Activity 6: Standing HR/TR x 30 reps each  Therapeutic Exercise Activity 7: Cybex Leg Press x30 reps, 2 plates  Therapeutic Exercise Activity 8: Cable column TKE's 5 plates x 30 reps  Therapeutic Exercise Activity 9: PROM L knee flexion in sitting x 12 minutes  Therapeutic Exercise Activity 10: stool scoots x 3 laps    Treatment  Time in clinic started at    4:55pm  Time in clinic ended at    5:37pm  Total time in clinic is .        42 minutes  Total timed code time is   38 minutes    Treatment Performed Today:.   Therapeutic Exercise x 3 units

## 2024-02-26 ENCOUNTER — APPOINTMENT (OUTPATIENT)
Dept: PHYSICAL THERAPY | Facility: CLINIC | Age: 64
End: 2024-02-26
Payer: COMMERCIAL

## 2024-02-28 ENCOUNTER — TREATMENT (OUTPATIENT)
Dept: PHYSICAL THERAPY | Facility: CLINIC | Age: 64
End: 2024-02-28
Payer: COMMERCIAL

## 2024-02-28 DIAGNOSIS — M17.12 UNILATERAL PRIMARY OSTEOARTHRITIS, LEFT KNEE: ICD-10-CM

## 2024-02-28 PROCEDURE — 97110 THERAPEUTIC EXERCISES: CPT | Mod: GP | Performed by: PHYSICAL THERAPIST

## 2024-02-28 ASSESSMENT — PAIN - FUNCTIONAL ASSESSMENT: PAIN_FUNCTIONAL_ASSESSMENT: 0-10

## 2024-02-28 ASSESSMENT — PAIN SCALES - GENERAL: PAINLEVEL_OUTOF10: 1

## 2024-02-28 NOTE — PROGRESS NOTES
PHYSICAL THERAPY TREATMENT    Patient name:  Savi Govea    MRN:  [id number]    :  1960    Today's Date:  24    Time Calculation  Start Time: 1215  Stop Time: 1300  Time Calculation (min): 45 min     PT Therapeutic Procedures Time Entry  Therapeutic Exercise Time Entry: 38      Referral by:  Referred By: Dr. Isaiah Shaw    Diagnoses:  Diagnosis and Precautions: L knee OA    Assessment/Plan:  Therapeutic exercise performed in order to improve L knee strength/ROM/mobility.  Patient requires increased tactile/verbal cues with exercise in order to reduce L knee stress/strain during the particular exercise performed.  Patient challenged with exercises performed in clinic secondary to L knee fatigue.   PT Assessment  PT Assessment Results: Decreased range of motion, Decreased strength, Decreased endurance, Impaired balance, Decreased mobility, Orthopedic restrictions, Pain  Rehab Prognosis: Good  Evaluation/Treatment Tolerance: Patient limited by fatigue, Patient limited by pain  OP PT Plan  PT Plan: Skilled PT  Rehab Potential: Good  Plan of Care Agreement: Patient    General Visit Information  PT  Visit  PT Received On: 24    General  Reason for Referral: PT Evaluate and Treat  Referred By: Dr. Isaiah Shaw  General Comment: Procedure Laterality Anesthesia  OPEN L TOTAL KNEE ARTHROPLASTY Dr. Isaiah Shaw 2024, L knee OA    Insurance  Insurance reviewed  Name of Insurance:  [facility]  Visit No.  7  LEFT TKA M17.12 ; 10% COINSUR / $1600 DED IS MET FOR  / $2000 OOP IS MET FOR / POLICY WILL % AFTER DED & OOP MET [id number]     Subjective:  Patient reports that her L knee is a little sore today.    Precautions  STEADI Fall Risk Score (The score of 4 or more indicates an increased risk of falling): 0  LE Weight Bearing Status: Weight Bearing as Tolerated  Medical Precautions:  (follow TKA protocol)  Precautions Comment: Procedure Laterality Anesthesia OPEN L TOTAL KNEE  ARTHROPLASTY Dr. Isaiah hSaw 1/5/2024, L knee OA    Pain:  Pain Assessment  Pain Assessment: 0-10  Pain Score: 1  Pain Location: Knee  Pain Orientation: Left    ROM  L knee flexion AROM in supine 109 degrees after stretching    Treatments    Therapeutic Exercise  Therapeutic Exercise Performed: Yes  Therapeutic Exercise Activity 1: Bike x 8 minutes,rocking  Therapeutic Exercise Activity 2: Lunge stretch on step 3 sets, 10 reps  Therapeutic Exercise Activity 3: Mini-Squats x 30 reps  Therapeutic Exercise Activity 4: Forward step-ups 6 inch, x 30 reps  Therapeutic Exercise Activity 5: Lateral step-ups 6 inch step, x 30 reps  Therapeutic Exercise Activity 6: Standing HR/TR x 30 reps each  Therapeutic Exercise Activity 7: Cybex Leg Press x30 reps, 2 plates  Therapeutic Exercise Activity 8: Cable column TKE's 5 plates x 30 reps  Therapeutic Exercise Activity 9: PROM L knee flexion in sitting x 12 minutes    Treatment  Time in clinic started at    12:15pm  Time in clinic ended at    1 pm  Total time in clinic is .        45 minutes  Total timed code time is   38 minutes    Treatment Performed Today:.   Therapeutic Exercise x 3 units

## 2024-03-04 ENCOUNTER — TREATMENT (OUTPATIENT)
Dept: PHYSICAL THERAPY | Facility: CLINIC | Age: 64
End: 2024-03-04
Payer: COMMERCIAL

## 2024-03-04 DIAGNOSIS — M17.11 PRIMARY OSTEOARTHRITIS OF RIGHT KNEE: ICD-10-CM

## 2024-03-04 DIAGNOSIS — M17.12 UNILATERAL PRIMARY OSTEOARTHRITIS, LEFT KNEE: Primary | ICD-10-CM

## 2024-03-04 PROCEDURE — 97110 THERAPEUTIC EXERCISES: CPT | Mod: GP,CQ

## 2024-03-04 ASSESSMENT — PAIN - FUNCTIONAL ASSESSMENT: PAIN_FUNCTIONAL_ASSESSMENT: 0-10

## 2024-03-04 ASSESSMENT — PAIN SCALES - GENERAL: PAINLEVEL_OUTOF10: 1

## 2024-03-04 NOTE — PROGRESS NOTES
"PHYSICAL THERAPY TREATMENT    Patient name:  Savi Govea    MRN:  [id number]    :  1960    Today's Date:  24    Time Calculation  Start Time: 232  Stop Time: 315  Time Calculation (min): 43 min     PT Therapeutic Procedures Time Entry  Therapeutic Exercise Time Entry: 40      Referral by:  Referred By: Dr. Isaiah Shaw    Diagnoses:  Diagnosis and Precautions: L knee OA    Assessment/Plan:  Therapeutic exercise performed in order to improve L knee strength/ROM/mobility. No c/o during/ after tx exceot during end range ROM knee flexion as to be expected. To ice at home per pt. Patient would benefit from P.T. to continue to address impairments in order to improve strength, flexibility, posture, and  to decrease symptoms and increase overall function.   Patient challenged with exercises performed in clinic secondary to L knee fatigue.   PT Assessment  Evaluation/Treatment Tolerance: Patient limited by fatigue, Patient limited by pain  OP PT Plan  PT Plan: Skilled PT    General Visit Information   General  Reason for Referral: PT Evaluate and Treat  Referred By: Dr. Isaiah Shaw  General Comment: Procedure Laterality Anesthesia  OPEN L TOTAL KNEE ARTHROPLASTY Dr. Isaiah Shaw 2024, L knee OA    Insurance  Insurance reviewed  Name of Insurance:  [facility]  Visit No.  8  LEFT TKA M17.12 ; 10% COINSUR / $1600 DED IS MET FOR  / $2000 OOP IS MET FOR / POLICY WILL % AFTER DED & OOP MET [id number]     Subjective:  Patient reports that her L knee is \"not too bad\" just a little sore today. Notes not doing HEP yesterday and was cleaning her moms house a lot yesterday. Also notes not icing recently.    Precautions  LE Weight Bearing Status: Weight Bearing as Tolerated  Medical Precautions:  (follow TKA protocol)  Precautions Comment: Procedure Laterality Anesthesia OPEN L TOTAL KNEE ARTHROPLASTY Dr. Isaiah Shaw 2024, L knee OA    Pain:  Pain Assessment  Pain " "Assessment: 0-10  Pain Score: 1  Pain Location: Knee  Pain Orientation: Left    Objective:  ROM  L knee flexion AROM in supine 104 degrees after stretching.    Treatments  Therapeutic Exercise Activity 1: Bike (SH:15) x 6 minutes,rocking  Therapeutic Exercise Activity 2: Lunge stretch on step 3 sets, 10 reps  Therapeutic Exercise Activity 3: Mini-Squats x 15 reps  Therapeutic Exercise Activity 4: Forward step-ups 6 inch, x 15 reps  Therapeutic Exercise Activity 5: Lateral step-ups 6 inch step, x 15 reps  Retro step-ups 6 inch, x 15 reps  Therapeutic Exercise Activity 6: Standing HR/TR x 30 reps each  Therapeutic Exercise Activity 7: Cybex Leg Press x30 reps, 2 plates  Therapeutic Exercise Activity 8: Cable column TKE's 5 plates x 30 reps nt  Therapeutic Exercise Activity 9: PROM L knee flexion in supine x 10 minutes   Dynamic Ab march, hsc, abd 2x30'ea  Gait tx  LAQ 4# x15  HSC BTB x15  Hamstring Stretch 3x30\"         "

## 2024-03-06 ENCOUNTER — TREATMENT (OUTPATIENT)
Dept: PHYSICAL THERAPY | Facility: CLINIC | Age: 64
End: 2024-03-06
Payer: COMMERCIAL

## 2024-03-06 DIAGNOSIS — M17.12 UNILATERAL PRIMARY OSTEOARTHRITIS, LEFT KNEE: ICD-10-CM

## 2024-03-06 PROCEDURE — 97110 THERAPEUTIC EXERCISES: CPT | Mod: GP | Performed by: PHYSICAL THERAPIST

## 2024-03-06 ASSESSMENT — PAIN - FUNCTIONAL ASSESSMENT: PAIN_FUNCTIONAL_ASSESSMENT: 0-10

## 2024-03-06 NOTE — PROGRESS NOTES
PHYSICAL THERAPY TREATMENT    Patient name:  Savi Govea    MRN:  [id number]    :  1960    Today's Date:  24    Time Calculation  Start Time: 1530  Stop Time: 1615  Time Calculation (min): 45 min     PT Therapeutic Procedures Time Entry  Therapeutic Exercise Time Entry: 38      Referral by:  Referred By: Dr. Isaiah Shaw    Diagnoses:  Diagnosis and Precautions: L knee OA    Assessment/Plan:  Therapeutic exercise performed in order to improve L knee strength/ROM/mobility.  Patient requires increased tactile/verbal cues with exercise in order to reduce L knee stress/strain during the particular exercise performed.  Patient challenged with exercises performed in clinic secondary to L knee fatigue.   PT Assessment  PT Assessment Results: Decreased range of motion, Decreased strength, Decreased endurance, Impaired balance, Decreased mobility, Orthopedic restrictions, Pain  Rehab Prognosis: Good  Evaluation/Treatment Tolerance: Patient limited by fatigue  OP PT Plan  PT Plan: Skilled PT  Rehab Potential: Good  Plan of Care Agreement: Patient    General Visit Information  PT  Visit  PT Received On: 24    General  Reason for Referral: PT Evaluate and Treat  Referred By: Dr. Isaiah Shaw  General Comment: Procedure Laterality Anesthesia  OPEN L TOTAL KNEE ARTHROPLASTY Dr. Isaiah Shaw 2024, L knee OA    Insurance  Insurance reviewed  Name of Insurance:  [facility]  Visit No.  9  LEFT TKA M17.12 ; 10% COINSUR / $1600 DED IS MET FOR  / $2000 OOP IS MET FOR / POLICY WILL % AFTER DED & OOP MET [id number]     Subjective:  Patient reports that her L knee is a little sore after the last session.    Precautions  LE Weight Bearing Status: Weight Bearing as Tolerated  Medical Precautions:  (follow TKA protocol)  Precautions Comment: Procedure Laterality Anesthesia OPEN L TOTAL KNEE ARTHROPLASTY Dr. Isaiah Shaw 2024, L knee OA    Pain:  Pain Assessment  Pain Assessment:  0-10  Pain Location: Knee  Pain Orientation: Left    ROM  L knee flexion AROM in supine 110 degrees after stretching    Treatments    Therapeutic Exercise  Therapeutic Exercise Performed: Yes  Therapeutic Exercise Activity 1: Bike x 8 minutes  Therapeutic Exercise Activity 2: Lunge stretch on step 3 sets, 10 reps  Therapeutic Exercise Activity 3: Mini-Squats x 30 reps  Therapeutic Exercise Activity 4: Forward step-ups 6 inch, x 30 reps  Therapeutic Exercise Activity 5: Lateral step-ups 6 inch step, x 30 reps  Therapeutic Exercise Activity 6: Standing HR/TR x 30 reps each  Therapeutic Exercise Activity 7: Cybex Leg Press x30 reps, 2 plates  Therapeutic Exercise Activity 8: stool scoots x 3 laps  Therapeutic Exercise Activity 9: PROM L knee flexion in sitting x 10 minutes    Treatment  Time in clinic started at    3:30pm  Time in clinic ended at    4:15pm  Total time in clinic is .         45 minutes  Total timed code time is   38 minutes    Treatment Performed Today:.   Therapeutic Exercise x 3 units

## 2024-03-11 ENCOUNTER — TREATMENT (OUTPATIENT)
Dept: PHYSICAL THERAPY | Facility: CLINIC | Age: 64
End: 2024-03-11
Payer: COMMERCIAL

## 2024-03-11 DIAGNOSIS — M17.12 UNILATERAL PRIMARY OSTEOARTHRITIS, LEFT KNEE: ICD-10-CM

## 2024-03-11 PROCEDURE — 97110 THERAPEUTIC EXERCISES: CPT | Mod: GP | Performed by: PHYSICAL THERAPIST

## 2024-03-11 ASSESSMENT — PAIN - FUNCTIONAL ASSESSMENT: PAIN_FUNCTIONAL_ASSESSMENT: 0-10

## 2024-03-11 NOTE — PROGRESS NOTES
PHYSICAL THERAPY TREATMENT    Patient name:  Savi Govea    MRN:  [id number]    :  1960    Today's Date:  24    Time Calculation  Start Time:   Stop Time: 0  Time Calculation (min): 47 min     PT Therapeutic Procedures Time Entry  Therapeutic Exercise Time Entry: 38      Referral by:  Referred By: Dr. Isaiah Shaw    Diagnoses:  Diagnosis and Precautions: L knee OA    Assessment/Plan:  Therapeutic exercise performed in order to improve L knee strength/ROM/mobility.  Patient requires increased tactile/verbal cues with exercise in order to reduce L knee stress/strain during the particular exercise performed.  Patient challenged with exercises performed in clinic secondary to L knee fatigue.   PT Assessment  PT Assessment Results: Decreased range of motion, Decreased strength, Decreased endurance, Impaired balance, Decreased mobility, Orthopedic restrictions, Pain  Rehab Prognosis: Good  Evaluation/Treatment Tolerance: Patient limited by fatigue  OP PT Plan  PT Plan: Skilled PT  Rehab Potential: Good  Plan of Care Agreement: Patient    General Visit Information  PT  Visit  PT Received On: 24    General  Reason for Referral: PT Evaluate and Treat  Referred By: Dr. Isaiah Shaw  General Comment: Procedure Laterality Anesthesia  OPEN L TOTAL KNEE ARTHROPLASTY Dr. Isaiah Shaw 2024, L knee OA    Insurance  Insurance reviewed  Name of Insurance:  [facility]  Visit No.  10/12  LEFT TKA M17.12 ; 10% COINSUR / $1600 DED IS MET FOR  / $2000 OOP IS MET FOR / POLICY WILL % AFTER DED & OOP MET [id number]     Subjective:  Patient reports that her L knee is just stiff today and also states that she has not been getting good sleep lately (3 hours/night).    Precautions  LE Weight Bearing Status: Weight Bearing as Tolerated  Medical Precautions:  (follow TKA protocol)  Precautions Comment: Procedure Laterality Anesthesia OPEN L TOTAL KNEE ARTHROPLASTY Dr. Isaiah Shaw  1/5/2024, L knee OA    Pain:  Pain Assessment  Pain Assessment: 0-10  Pain Location: Knee  Pain Orientation: Left    ROM  L knee flexion AROM in supine 109 degrees after stretching    Treatments    Therapeutic Exercise  Therapeutic Exercise Performed: Yes  Therapeutic Exercise Activity 1: Bike x 8 minutes  Therapeutic Exercise Activity 2: Lunge stretch on step 3 sets, 10 reps  Therapeutic Exercise Activity 3: Mini-Squats x 30 reps  Therapeutic Exercise Activity 4: Forward step-ups 6 inch, x 30 reps  Therapeutic Exercise Activity 5: Lateral step-ups 6 inch step, x 30 reps  Therapeutic Exercise Activity 6: Standing HR/TR x 30 reps each  Therapeutic Exercise Activity 7: Cybex Leg Press x30 reps, 2 plates  Therapeutic Exercise Activity 8: PROM L knee flexion in sitting x 10 minutes    Treatment  Time in clinic started at    4:43pm  Time in clinic ended at    5:30pm  Total time in clinic is .          47 minutes  Total timed code time is   38 minutes    Treatment Performed Today:.   Therapeutic Exercise x 3 units

## 2024-03-13 ENCOUNTER — TREATMENT (OUTPATIENT)
Dept: PHYSICAL THERAPY | Facility: CLINIC | Age: 64
End: 2024-03-13
Payer: COMMERCIAL

## 2024-03-13 DIAGNOSIS — M17.12 UNILATERAL PRIMARY OSTEOARTHRITIS, LEFT KNEE: ICD-10-CM

## 2024-03-13 PROCEDURE — 97110 THERAPEUTIC EXERCISES: CPT | Mod: GP | Performed by: PHYSICAL THERAPIST

## 2024-03-13 ASSESSMENT — PAIN SCALES - GENERAL: PAINLEVEL_OUTOF10: 1

## 2024-03-13 ASSESSMENT — PAIN - FUNCTIONAL ASSESSMENT: PAIN_FUNCTIONAL_ASSESSMENT: 0-10

## 2024-03-13 NOTE — PROGRESS NOTES
PHYSICAL THERAPY TREATMENT    Patient name:  Savi Govea    MRN:  [id number]    :  1960    Today's Date:  24    Time Calculation  Start Time: 1400  Stop Time: 1445  Time Calculation (min): 45 min     PT Therapeutic Procedures Time Entry  Therapeutic Exercise Time Entry: 38      Referral by:  Referred By: Dr. Isaiah Shaw    Diagnoses:  Diagnosis and Precautions: L knee OA    Assessment/Plan:  Therapeutic exercise performed in order to improve L knee strength/ROM/mobility.  Patient requires increased tactile/verbal cues with exercise in order to reduce L knee stress/strain during the particular exercise performed.  Patient challenged with exercises performed in clinic secondary to L knee fatigue.   PT Assessment  PT Assessment Results: Decreased range of motion, Decreased strength, Decreased endurance, Impaired balance, Decreased mobility, Orthopedic restrictions, Pain  Rehab Prognosis: Good  Evaluation/Treatment Tolerance: Patient limited by fatigue  OP PT Plan  PT Plan: Skilled PT  Rehab Potential: Good  Plan of Care Agreement: Patient    General Visit Information  PT  Visit  PT Received On: 24    General  Reason for Referral: PT Evaluate and Treat  Referred By: Dr. Isaiah Shaw  General Comment: Procedure Laterality Anesthesia  OPEN L TOTAL KNEE ARTHROPLASTY Dr. Isaiah Shaw 2024, L knee OA    Insurance  Insurance reviewed  Name of Insurance:  [facility]  Visit No.  11/12  LEFT TKA M17.12 ; 10% COINSUR / $1600 DED IS MET FOR  / $2000 OOP IS MET FOR / POLICY WILL % AFTER DED & OOP MET [id number]     Subjective:  Patient reports that her L knee feeling better today overall and got some better sleep last night.    Precautions  LE Weight Bearing Status: Weight Bearing as Tolerated  Medical Precautions:  (follow TKA protocol)  Precautions Comment: Procedure Laterality Anesthesia OPEN L TOTAL KNEE ARTHROPLASTY Dr. Isaiah Shaw 2024, L knee OA    Pain:  Pain  Assessment  Pain Assessment: 0-10  Pain Score: 1  Pain Location: Knee  Pain Orientation: Left    ROM  L knee flexion AROM in supine 113 degrees after stretching    Treatments    Therapeutic Exercise  Therapeutic Exercise Performed: Yes  Therapeutic Exercise Activity 1: Bike x 8 minutes  Therapeutic Exercise Activity 2: Lunge stretch on step 3 sets, 10 reps  Therapeutic Exercise Activity 3: Mini-Squats x 30 reps  Therapeutic Exercise Activity 4: Forward step-ups 6 inch, x 30 reps  Therapeutic Exercise Activity 5: Lateral step-ups 6 inch step, x 30 reps  Therapeutic Exercise Activity 6: Standing HR/TR x 30 reps each  Therapeutic Exercise Activity 7: Cybex Leg Press x30 reps, 2 plates  Therapeutic Exercise Activity 8: PROM L knee flexion in sitting x 10 minutes  Therapeutic Exercise Activity 9: stool scoots x 2 laps    Treatment  Time in clinic started at    2pm  Time in clinic ended at    2:45pm  Total time in clinic is .         45 minutes  Total timed code time is   38 minutes    Treatment Performed Today:.   Therapeutic Exercise x 3 units

## 2024-03-18 ENCOUNTER — TREATMENT (OUTPATIENT)
Dept: PHYSICAL THERAPY | Facility: CLINIC | Age: 64
End: 2024-03-18
Payer: COMMERCIAL

## 2024-03-18 DIAGNOSIS — M17.12 UNILATERAL PRIMARY OSTEOARTHRITIS, LEFT KNEE: ICD-10-CM

## 2024-03-18 PROCEDURE — 97110 THERAPEUTIC EXERCISES: CPT | Mod: GP | Performed by: PHYSICAL THERAPIST

## 2024-03-18 ASSESSMENT — PAIN - FUNCTIONAL ASSESSMENT: PAIN_FUNCTIONAL_ASSESSMENT: 0-10

## 2024-03-18 ASSESSMENT — PAIN SCALES - GENERAL: PAINLEVEL_OUTOF10: 0 - NO PAIN

## 2024-03-18 NOTE — PROGRESS NOTES
PHYSICAL THERAPY TREATMENT    Patient name:  Savi Govea    MRN:  [id number]    :  1960    Today's Date:  24    Time Calculation  Start Time: 1430  Stop Time: 1515  Time Calculation (min): 45 min     PT Therapeutic Procedures Time Entry  Therapeutic Exercise Time Entry: 38      Referral by:  Referred By: Dr. Isaiah Shaw    Diagnoses:  Diagnosis and Precautions: L knee OA    Goals:   By the end of 12 visits patient will be able to do the following with < 1/10 L KNEE pain:    HEP:  Patient will consistently perform HER home exercise program for 20-30 minute sessions, 1-2x/day, 3-4 days/week independently by the end of 12 visits.  MET    Basic ADL's:   Patient will perform bADL's/instrumental ADL's for 30 minutes moving between various closed kinetic chain postures.  MET    ROM and Strength:  Patient will demonstrate 5/5 L KNEE strength in all planes and 0-120 degrees  L KNEE AROM in all planes to improve their ability to lift, stand, ambulate and perform basic ADL's.  PROGRESSING VERY WELL    Stair Negotiation:  Patient will be able to ambulate up/down stairs for 1-2 flights at a time.  MET    Gait/Locomotion:  Patient will be able to ambulate for 30-60 minutes at a time.  MET  Minimal to no gait deviation across level ground/stairs.  MET  Patient will demonstrate no L KNEE pain with the following movements:  partial squat, lunge, forward/lateral step-up, HR, stepdown and SLS.    WORK:  Patient will return to WORK and perform normal WORK activities pain free.  PROGRESSING    Sleep:  Patient will sleep thru the night 4/7 nights/week.  MINIMAL CHANGE    Participation restrictions:  Increase LEFS to > or = to 60/80 for increased functional ability.  PROGRESSING VERY WELL    Pain:  Decrease pain at worst to < or = to 1/10 for improved QOL and ability to sleep.  MET    No point tenderness noted over the L knee.   MET    Assessment/Plan:  Patient comes into clinic today for PT Re-Evaluation secondary to  being 10.5 weeks s/p OPEN L TOTAL KNEE ARTHROPLASTY Dr. Isaiah Shaw 1/5/2024, L knee OA.  Patient demonstrates very good progress with regards to her L knee rehab thus far.  Patient has met 7/11 PT goals, is independent with her HEP and patient placed on hold until after MD visit.    Therapeutic exercise performed in order to improve L knee strength/ROM/mobility.  Patient requires increased tactile/verbal cues with exercise in order to reduce L knee stress/strain during the particular exercise performed.  Patient challenged with exercises performed in clinic secondary to L knee fatigue.   PT Assessment  PT Assessment Results: Decreased range of motion  Rehab Prognosis: Good  OP PT Plan  PT Plan: Other (Comment) (patient placed on hold)  PT Frequency:  (patient placed on hold until after MD visit)  Duration: patient placed on hold unti after MD visit  Number of Treatments Authorized: 1  Rehab Potential: Good  Plan of Care Agreement: Patient    General Visit Information  PT  Visit  PT Received On: 03/18/24    General  Reason for Referral: PT Evaluate and Treat  Referred By: Dr. Isaiah Shaw  General Comment: Procedure Laterality Anesthesia  OPEN L TOTAL KNEE ARTHROPLASTY Dr. Isaiah Shaw 1/5/2024, L knee OA    Insurance  Insurance reviewed  Name of Insurance:  [facility]  Visit No.  12/12  LEFT TKA M17.12 ; 10% COINSUR / $1600 DED IS MET FOR 2024 / $2000 OOP IS MET FOR 2024/ POLICY WILL % AFTER DED & OOP MET [id number]     Subjective:  Patient comes into clinic today for PT Re-Evaluation secondary to being 10.5 weeks s/p OPEN L TOTAL KNEE ARTHROPLASTY Dr. Isaiah Shaw 1/5/2024, L knee OA.  Patient reports that her L knee feels 85% improved with regards to performing basic ADL's.  Patient to see MD tomorrow.    Precautions  LE Weight Bearing Status: Weight Bearing as Tolerated  Medical Precautions:  (follow TKA protocol)  Precautions Comment: Procedure Laterality Anesthesia OPEN L TOTAL KNEE  ARTHROPLASTY Dr. Isaiah Shaw 1/5/2024, L knee OA    Pain:  Pain Assessment  Pain Assessment: 0-10  Pain Score: 0 - No pain  Pain Location: Knee  Pain Orientation: Left    Objective:  Restrictions as per MD's Protocol if surgical:  see protocol above    Weightbearing Status:  FWB L LE    Skin:  L knee surgical TKA incision healed and closed, no active signs of infection    Palpation:   no point tenderness over L knee    Sensation:  Patient denies numbness/tingling of bilateral LOWER extremities.    Gait:  normal gait L LE    ROM:  AROM  L knee 0 degrees-114 degrees    Strength:    L knee 4+/5 flexion/extension each,    Outcome measure  Lower Extremity Funtional Score (LEFS): 48/80    Treatments    Therapeutic Exercise  Therapeutic Exercise Performed: Yes  Therapeutic Exercise Activity 2: Lunge stretch on step 3 sets, 10 reps  Therapeutic Exercise Activity 3: Mini-Squats x 30 reps  Therapeutic Exercise Activity 4: Forward step-ups 6 inch, x 30 reps  Therapeutic Exercise Activity 5: Lateral step-ups 6 inch step, x 30 reps  Therapeutic Exercise Activity 6: Standing HR/TR x 30 reps each  Therapeutic Exercise Activity 7: PROM L knee flexion in sitting x 10 minutes  Therapeutic Exercise Activity 8: Re-Evaluation    Treatment  Time in clinic started at    2:30pm  Time in clinic ended at    3:15pm  Total time in clinic is .         45 minutes  Total timed code time is   38 minutes    Treatment Performed Today:.   Therapeutic Exercise x 3 units

## 2024-03-19 ENCOUNTER — OFFICE VISIT (OUTPATIENT)
Dept: ORTHOPEDIC SURGERY | Facility: CLINIC | Age: 64
End: 2024-03-19
Payer: COMMERCIAL

## 2024-03-19 DIAGNOSIS — G89.18 POST-OP PAIN: Primary | ICD-10-CM

## 2024-03-19 DIAGNOSIS — Z96.652 AFTERCARE FOLLOWING LEFT KNEE JOINT REPLACEMENT SURGERY: ICD-10-CM

## 2024-03-19 DIAGNOSIS — Z47.1 AFTERCARE FOLLOWING LEFT KNEE JOINT REPLACEMENT SURGERY: ICD-10-CM

## 2024-03-19 PROCEDURE — 99024 POSTOP FOLLOW-UP VISIT: CPT | Performed by: ORTHOPAEDIC SURGERY

## 2024-03-19 PROCEDURE — 1036F TOBACCO NON-USER: CPT | Performed by: ORTHOPAEDIC SURGERY

## 2024-03-19 PROCEDURE — 4010F ACE/ARB THERAPY RXD/TAKEN: CPT | Performed by: ORTHOPAEDIC SURGERY

## 2024-03-19 RX ORDER — DIAZEPAM 5 MG/1
TABLET ORAL
Qty: 5 TABLET | Refills: 0 | Status: SHIPPED | OUTPATIENT
Start: 2024-03-19

## 2024-03-19 NOTE — LETTER
March 19, 2024     Patient: Savi Govea   YOB: 1960   Date of Visit: 3/19/2024       To Whom it May Concern:    Savi Govea was seen in my clinic on 3/19/2024. She  may return to work on 03/29/2024 with no restrictions .    If you have any questions or concerns, please don't hesitate to call.         Sincerely,          Isaiah Shaw MD        CC: No Recipients

## 2024-03-19 NOTE — PROGRESS NOTES
History of Present Illness:  Patient returns today endorsing mild pain.  Patient notes improving functional status.  She states that she is having a rather difficult time sleeping, averages about 2 to 3 hours per night and has done this for about 5 weeks.  She is traveling to [place] shortly for a small vacation.    Physical Examination:  Well healed incision   ROM: 0-110  No laxity to varus / valgus stress  + Plantar/dorsiflexion  Negative Nicolas test    Assessment:  Patient status post left total knee arthroplasty, doing well    Plan:  Discussed analgesics.  We discussed a small prescription of Valium for her to help her fall asleep as we do feel her problem is primarily related to falling asleep instead of staying asleep.  Recommended following up with primary care to discuss options for sleep aids if her problem persists.  Reviewed range of motion, strength goals.  Discussed activities to avoid  Dental prophylaxis discussed.  Follow-up: As needed    Niranjan Wesley PA-C     In a face to face encounter, I evaluated the patient and performed a physical examination, discussed pertinent diagnostic studies if indicated and discussed diagnosis and management strategies with both the patient and physician assistant / nurse practitioner.  I reviewed the PA/NP's note and agree with the documented findings and plan of care.        Isaiah Shaw MD

## 2024-03-20 ENCOUNTER — APPOINTMENT (OUTPATIENT)
Dept: PHYSICAL THERAPY | Facility: CLINIC | Age: 64
End: 2024-03-20
Payer: COMMERCIAL

## 2024-06-12 ENCOUNTER — APPOINTMENT (OUTPATIENT)
Dept: GASTROENTEROLOGY | Facility: CLINIC | Age: 64
End: 2024-06-12
Payer: COMMERCIAL

## 2024-06-17 ENCOUNTER — LAB (OUTPATIENT)
Dept: LAB | Facility: LAB | Age: 64
End: 2024-06-17
Payer: COMMERCIAL

## 2024-06-17 ENCOUNTER — APPOINTMENT (OUTPATIENT)
Dept: GASTROENTEROLOGY | Facility: CLINIC | Age: 64
End: 2024-06-17
Payer: COMMERCIAL

## 2024-06-17 VITALS
OXYGEN SATURATION: 94 % | SYSTOLIC BLOOD PRESSURE: 150 MMHG | DIASTOLIC BLOOD PRESSURE: 80 MMHG | RESPIRATION RATE: 18 BRPM | HEART RATE: 68 BPM | HEIGHT: 65 IN | WEIGHT: 252.6 LBS | BODY MASS INDEX: 42.09 KG/M2

## 2024-06-17 DIAGNOSIS — R10.84 GENERALIZED ABDOMINAL PAIN: Primary | ICD-10-CM

## 2024-06-17 DIAGNOSIS — R10.84 GENERALIZED ABDOMINAL PAIN: ICD-10-CM

## 2024-06-17 LAB
ALBUMIN SERPL BCP-MCNC: 4.3 G/DL (ref 3.4–5)
ALP SERPL-CCNC: 86 U/L (ref 33–136)
ALT SERPL W P-5'-P-CCNC: 31 U/L (ref 7–45)
AST SERPL W P-5'-P-CCNC: 18 U/L (ref 9–39)
BASOPHILS # BLD AUTO: 0.08 X10*3/UL (ref 0–0.1)
BASOPHILS NFR BLD AUTO: 0.7 %
BILIRUB DIRECT SERPL-MCNC: 0.1 MG/DL (ref 0–0.3)
BILIRUB SERPL-MCNC: 0.4 MG/DL (ref 0–1.2)
EOSINOPHIL # BLD AUTO: 0.27 X10*3/UL (ref 0–0.7)
EOSINOPHIL NFR BLD AUTO: 2.2 %
ERYTHROCYTE [DISTWIDTH] IN BLOOD BY AUTOMATED COUNT: 16 % (ref 11.5–14.5)
HCT VFR BLD AUTO: 46.4 % (ref 36–46)
HGB BLD-MCNC: 14.2 G/DL (ref 12–16)
IMM GRANULOCYTES # BLD AUTO: 0.03 X10*3/UL (ref 0–0.7)
IMM GRANULOCYTES NFR BLD AUTO: 0.2 % (ref 0–0.9)
LYMPHOCYTES # BLD AUTO: 1.45 X10*3/UL (ref 1.2–4.8)
LYMPHOCYTES NFR BLD AUTO: 12.1 %
MCH RBC QN AUTO: 25 PG (ref 26–34)
MCHC RBC AUTO-ENTMCNC: 30.6 G/DL (ref 32–36)
MCV RBC AUTO: 82 FL (ref 80–100)
MONOCYTES # BLD AUTO: 0.67 X10*3/UL (ref 0.1–1)
MONOCYTES NFR BLD AUTO: 5.6 %
NEUTROPHILS # BLD AUTO: 9.52 X10*3/UL (ref 1.2–7.7)
NEUTROPHILS NFR BLD AUTO: 79.2 %
NRBC BLD-RTO: 0 /100 WBCS (ref 0–0)
PLATELET # BLD AUTO: 354 X10*3/UL (ref 150–450)
PROT SERPL-MCNC: 7 G/DL (ref 6.4–8.2)
RBC # BLD AUTO: 5.67 X10*6/UL (ref 4–5.2)
WBC # BLD AUTO: 12 X10*3/UL (ref 4.4–11.3)

## 2024-06-17 PROCEDURE — 99205 OFFICE O/P NEW HI 60 MIN: CPT | Performed by: STUDENT IN AN ORGANIZED HEALTH CARE EDUCATION/TRAINING PROGRAM

## 2024-06-17 PROCEDURE — 36415 COLL VENOUS BLD VENIPUNCTURE: CPT

## 2024-06-17 PROCEDURE — 85025 COMPLETE CBC W/AUTO DIFF WBC: CPT

## 2024-06-17 PROCEDURE — 3079F DIAST BP 80-89 MM HG: CPT | Performed by: STUDENT IN AN ORGANIZED HEALTH CARE EDUCATION/TRAINING PROGRAM

## 2024-06-17 PROCEDURE — 3077F SYST BP >= 140 MM HG: CPT | Performed by: STUDENT IN AN ORGANIZED HEALTH CARE EDUCATION/TRAINING PROGRAM

## 2024-06-17 PROCEDURE — 80076 HEPATIC FUNCTION PANEL: CPT

## 2024-06-17 PROCEDURE — 83013 H PYLORI (C-13) BREATH: CPT

## 2024-06-17 PROCEDURE — 4010F ACE/ARB THERAPY RXD/TAKEN: CPT | Performed by: STUDENT IN AN ORGANIZED HEALTH CARE EDUCATION/TRAINING PROGRAM

## 2024-06-17 NOTE — PROGRESS NOTES
"Subjective     History of Present Illness:   Savi Govea is a 63 y.o. female with hx of T2DM (A1c of ~ 7), obesity who presents to clinic for evaluation of chronic symptoms of nausea, emesis, abdominal pain and diarrhea.       For the past two years, she has abdominal pain daily. The pain \"moves around\". Sometimes it is present on the right and sometimes on the left. Radiating to her upper back. It is associated with belching and burping. No association with meals. No association with medications. Does not awake her from sleep. The pain is cramping or sharp. In addition 2-3 x times a week monthly will have emesis that is sporadic and NBNB. She otherwise has nausea daily that is worsened when she has the pain. The pain is present all day but waxes and wanes. Does not awake her from her sleep.    Ozempic used for just one month prior to the onset of these symptoms.     No weight loss or blood in stool    She did try protonix and states she started vomiting the next morning so has not been on PPI    No EtOH. Does use Mobic daily for OA.     She does have family hx of NE cancer in her brother.     Past Medical History   has a past medical history of Abnormal weight gain, Arrhythmia, Arthritis, Diabetes mellitus (Multi), Hypertension, Irregular heart beat, Irritable bowel syndrome, Joint pain, Nephrolithiasis, Other abnormal glucose, Other conditions influencing health status, Personal history of other diseases of the circulatory system, Personal history of other diseases of the musculoskeletal system and connective tissue, Radiculopathy, lumbosacral region, Seasonal allergies, Sprain of ligaments of thoracic spine, initial encounter, Urinary tract infection, and Wears glasses.     Social History   reports that she quit smoking about 19 years ago. Her smoking use included cigarettes. She started smoking about 29 years ago. She has a 5 pack-year smoking history. She has never used smokeless tobacco. She reports that " she does not drink alcohol and does not use drugs.     Family History  family history includes Diabetes in her brother, maternal grandmother, and paternal grandfather; Graves' disease in her brother; Hypertension in her father, maternal grandfather, and paternal grandfather; Multiple sclerosis in her brother; Skin cancer in her maternal grandmother; Thyroid cancer in her sister; Uterine cancer in her mother.     Allergies  Allergies   Allergen Reactions    Aspirin GI Upset     Jittery feeling    Cefditoren Unknown    Cetirizine Other     Leg cramps    Fexofenadine Other     Leg cramps    Phenylephrine-Guaifenesin Other     Patient states that she gets very hyper and cannot sleep.    Pseudoephedrine-Guaifenesin Unknown    Salicylates Other    Spectracef [Cefditoren Pivoxil] Unknown       Medications  Current Outpatient Medications   Medication Instructions    alpha lipoic acid 300 mg capsule 1 capsule, oral, 2 times daily    ammonium lactate (Lac-Hydrin) 12 % lotion Topical, Daily PRN, As directed     APPLE CIDER VINEGAR ORAL 2 capsules, oral, 2 times daily    ascorbic acid (VITAMIN C) 1,000 mg, oral, Daily    calcium citrate-vitamin D3 200 mg-3.125 mcg (125 unit) tablet 1 tablet, oral, Daily    CHROMIUM PICOLINATE ORAL 1 tablet, oral, Daily    cinnamon (CINNAMON) 500 mg, oral, 2 times daily    fluticasone (Flonase) 50 mcg/actuation nasal spray 1 spray, Each Nostril, Daily PRN    glimepiride (AMARYL) 4 mg, oral, 2 times daily    irbesartan (AVAPRO) 300 mg, oral, Daily    Januvia 100 mg tablet Take 1 tablet (100 mg) by mouth once daily.    Jardiance 25 mg, oral, Daily    loratadine (CLARITIN) 10 mg, oral, Daily    magnesium oxide 400 mg magnesium capsule 1 capsule, oral, 2 times daily    meloxicam (MOBIC) 15 mg, oral, Daily    metFORMIN (GLUCOPHAGE) 1,000 mg, oral, 2 times daily (morning and late afternoon)    metoprolol succinate XL (KAPSPARGO SPRINKLE) 100 mg, oral, Daily    multivitamin tablet 1 tablet, oral, Daily  RT    OneTouch Delica Plus Lancet 30 gauge misc     OneTouch Verio test strips strip     pomegranate fruit extract 250 mg capsule 1 capsule, oral, 2 times daily    potassium citrate 99 mg, oral, Daily RT    rivaroxaban (XARELTO) 10 mg, oral, Daily    saccharomyces boulardii (FLORASTOR) 250 mg, oral, Daily    Soma 350 mg, oral, Daily    zinc gluconate 50 mg tablet 1 tablet, oral, Daily        Objective   Visit Vitals  /80 (BP Location: Right arm, Patient Position: Sitting, BP Cuff Size: Large adult)   Pulse 68   Resp 18      Physical Exam  Constitutional:       General: She is not in acute distress.     Appearance: Normal appearance.   HENT:      Head: Normocephalic and atraumatic.      Mouth/Throat:      Mouth: Mucous membranes are moist.   Eyes:      Extraocular Movements: Extraocular movements intact.   Pulmonary:      Effort: Pulmonary effort is normal.      Breath sounds: No stridor. No wheezing.   Abdominal:      General: Abdomen is flat. There is no distension.   Musculoskeletal:         General: Normal range of motion.   Skin:     General: Skin is warm and dry.   Neurological:      General: No focal deficit present.      Mental Status: She is alert.   Psychiatric:         Mood and Affect: Mood normal.         Judgment: Judgment normal.         Assessment/Plan   Savi Govea is a 63 y.o. female with hx of T2DM (A1c of ~ 7), obesity who presents to clinic for evaluation of a two year hx of a constellation of GI concerns including sporadic NBNB emesis, daily abdominal pain and occasional diarrhea. No alarm signs. Warrants bidrectional endoscopy as symptom are broad and cannot be yet localized. She has not had prior EGD or colonoscopy.  Note that CT AP in 08/2023 showed nonspecific enteritis. Agree with evaluation with urology as well given hx of nephrolithiasis and hematuria.     Problem List Items Addressed This Visit    None  Visit Diagnoses       Generalized abdominal pain    -  Primary    Relevant  Orders    Colonoscopy Diagnostic (diarrhea)    Esophagogastroduodenoscopy (EGD)    H. Pylori Breath Test    CBC and Auto Differential    Hepatic function panel                   Melly Winn MD         My final recommendations will be communicated back to the requesting physician by way of shared Medical record or letter to requesting physician via fax.

## 2024-06-18 LAB — UREA BREATH TEST QL: NEGATIVE

## 2024-07-02 ENCOUNTER — APPOINTMENT (OUTPATIENT)
Dept: RADIOLOGY | Facility: HOSPITAL | Age: 64
End: 2024-07-02
Payer: COMMERCIAL

## 2024-07-02 ENCOUNTER — HOSPITAL ENCOUNTER (EMERGENCY)
Facility: HOSPITAL | Age: 64
Discharge: HOME | End: 2024-07-02
Payer: COMMERCIAL

## 2024-07-02 ENCOUNTER — APPOINTMENT (OUTPATIENT)
Dept: CARDIOLOGY | Facility: HOSPITAL | Age: 64
End: 2024-07-02
Payer: COMMERCIAL

## 2024-07-02 ENCOUNTER — TELEPHONE (OUTPATIENT)
Dept: GASTROENTEROLOGY | Facility: CLINIC | Age: 64
End: 2024-07-02
Payer: COMMERCIAL

## 2024-07-02 VITALS
HEART RATE: 73 BPM | TEMPERATURE: 96.8 F | DIASTOLIC BLOOD PRESSURE: 77 MMHG | WEIGHT: 252 LBS | OXYGEN SATURATION: 98 % | RESPIRATION RATE: 20 BRPM | SYSTOLIC BLOOD PRESSURE: 130 MMHG | BODY MASS INDEX: 41.99 KG/M2 | HEIGHT: 65 IN

## 2024-07-02 DIAGNOSIS — R10.84 GENERALIZED ABDOMINAL PAIN: ICD-10-CM

## 2024-07-02 DIAGNOSIS — K57.90 DIVERTICULOSIS: ICD-10-CM

## 2024-07-02 DIAGNOSIS — N30.91 CYSTITIS WITH HEMATURIA: Primary | ICD-10-CM

## 2024-07-02 DIAGNOSIS — Z12.11 COLON CANCER SCREENING: ICD-10-CM

## 2024-07-02 LAB
ALBUMIN SERPL BCP-MCNC: 4.1 G/DL (ref 3.4–5)
ALP SERPL-CCNC: 79 U/L (ref 33–136)
ALT SERPL W P-5'-P-CCNC: 21 U/L (ref 7–45)
ANION GAP SERPL CALC-SCNC: 16 MMOL/L (ref 10–20)
APPEARANCE UR: ABNORMAL
AST SERPL W P-5'-P-CCNC: 16 U/L (ref 9–39)
ATRIAL RATE: 77 BPM
BACTERIA #/AREA URNS AUTO: ABNORMAL /HPF
BASOPHILS # BLD AUTO: 0.08 X10*3/UL (ref 0–0.1)
BASOPHILS NFR BLD AUTO: 0.8 %
BILIRUB SERPL-MCNC: 0.5 MG/DL (ref 0–1.2)
BILIRUB UR STRIP.AUTO-MCNC: NEGATIVE MG/DL
BUN SERPL-MCNC: 12 MG/DL (ref 6–23)
CALCIUM SERPL-MCNC: 9.3 MG/DL (ref 8.6–10.3)
CARDIAC TROPONIN I PNL SERPL HS: 4 NG/L (ref 0–13)
CHLORIDE SERPL-SCNC: 104 MMOL/L (ref 98–107)
CO2 SERPL-SCNC: 21 MMOL/L (ref 21–32)
COLOR UR: YELLOW
CREAT SERPL-MCNC: 0.8 MG/DL (ref 0.5–1.05)
EGFRCR SERPLBLD CKD-EPI 2021: 83 ML/MIN/1.73M*2
EOSINOPHIL # BLD AUTO: 0.11 X10*3/UL (ref 0–0.7)
EOSINOPHIL NFR BLD AUTO: 1 %
ERYTHROCYTE [DISTWIDTH] IN BLOOD BY AUTOMATED COUNT: 15.5 % (ref 11.5–14.5)
GLUCOSE SERPL-MCNC: 216 MG/DL (ref 74–99)
GLUCOSE UR STRIP.AUTO-MCNC: ABNORMAL MG/DL
HCT VFR BLD AUTO: 43.8 % (ref 36–46)
HGB BLD-MCNC: 14 G/DL (ref 12–16)
IMM GRANULOCYTES # BLD AUTO: 0.05 X10*3/UL (ref 0–0.7)
IMM GRANULOCYTES NFR BLD AUTO: 0.5 % (ref 0–0.9)
KETONES UR STRIP.AUTO-MCNC: ABNORMAL MG/DL
LEUKOCYTE ESTERASE UR QL STRIP.AUTO: ABNORMAL
LIPASE SERPL-CCNC: 28 U/L (ref 9–82)
LYMPHOCYTES # BLD AUTO: 1.33 X10*3/UL (ref 1.2–4.8)
LYMPHOCYTES NFR BLD AUTO: 12.6 %
MCH RBC QN AUTO: 25.5 PG (ref 26–34)
MCHC RBC AUTO-ENTMCNC: 32 G/DL (ref 32–36)
MCV RBC AUTO: 80 FL (ref 80–100)
MONOCYTES # BLD AUTO: 0.64 X10*3/UL (ref 0.1–1)
MONOCYTES NFR BLD AUTO: 6.1 %
MUCOUS THREADS #/AREA URNS AUTO: ABNORMAL /LPF
NEUTROPHILS # BLD AUTO: 8.32 X10*3/UL (ref 1.2–7.7)
NEUTROPHILS NFR BLD AUTO: 79 %
NITRITE UR QL STRIP.AUTO: NEGATIVE
NRBC BLD-RTO: 0 /100 WBCS (ref 0–0)
P AXIS: 40 DEGREES
P OFFSET: 197 MS
P ONSET: 136 MS
PH UR STRIP.AUTO: 5.5 [PH]
PLATELET # BLD AUTO: 331 X10*3/UL (ref 150–450)
POTASSIUM SERPL-SCNC: 3.9 MMOL/L (ref 3.5–5.3)
PR INTERVAL: 158 MS
PROT SERPL-MCNC: 7.1 G/DL (ref 6.4–8.2)
PROT UR STRIP.AUTO-MCNC: ABNORMAL MG/DL
Q ONSET: 215 MS
QRS COUNT: 12 BEATS
QRS DURATION: 82 MS
QT INTERVAL: 404 MS
QTC CALCULATION(BAZETT): 457 MS
QTC FREDERICIA: 439 MS
R AXIS: -21 DEGREES
RBC # BLD AUTO: 5.48 X10*6/UL (ref 4–5.2)
RBC # UR STRIP.AUTO: ABNORMAL /UL
RBC #/AREA URNS AUTO: >20 /HPF
SODIUM SERPL-SCNC: 137 MMOL/L (ref 136–145)
SP GR UR STRIP.AUTO: 1.03
SQUAMOUS #/AREA URNS AUTO: ABNORMAL /HPF
T AXIS: 22 DEGREES
T OFFSET: 417 MS
UROBILINOGEN UR STRIP.AUTO-MCNC: NORMAL MG/DL
VENTRICULAR RATE: 77 BPM
WBC # BLD AUTO: 10.5 X10*3/UL (ref 4.4–11.3)
WBC #/AREA URNS AUTO: >50 /HPF

## 2024-07-02 PROCEDURE — 2500000004 HC RX 250 GENERAL PHARMACY W/ HCPCS (ALT 636 FOR OP/ED): Performed by: REGISTERED NURSE

## 2024-07-02 PROCEDURE — 71045 X-RAY EXAM CHEST 1 VIEW: CPT | Performed by: RADIOLOGY

## 2024-07-02 PROCEDURE — 83690 ASSAY OF LIPASE: CPT | Performed by: REGISTERED NURSE

## 2024-07-02 PROCEDURE — 85025 COMPLETE CBC W/AUTO DIFF WBC: CPT | Performed by: REGISTERED NURSE

## 2024-07-02 PROCEDURE — 93005 ELECTROCARDIOGRAM TRACING: CPT

## 2024-07-02 PROCEDURE — 36415 COLL VENOUS BLD VENIPUNCTURE: CPT | Performed by: REGISTERED NURSE

## 2024-07-02 PROCEDURE — 96374 THER/PROPH/DIAG INJ IV PUSH: CPT | Mod: 59

## 2024-07-02 PROCEDURE — 99285 EMERGENCY DEPT VISIT HI MDM: CPT | Mod: 25

## 2024-07-02 PROCEDURE — 74177 CT ABD & PELVIS W/CONTRAST: CPT

## 2024-07-02 PROCEDURE — 84484 ASSAY OF TROPONIN QUANT: CPT | Performed by: REGISTERED NURSE

## 2024-07-02 PROCEDURE — 74177 CT ABD & PELVIS W/CONTRAST: CPT | Performed by: RADIOLOGY

## 2024-07-02 PROCEDURE — 80053 COMPREHEN METABOLIC PANEL: CPT | Performed by: REGISTERED NURSE

## 2024-07-02 PROCEDURE — 81001 URINALYSIS AUTO W/SCOPE: CPT | Performed by: REGISTERED NURSE

## 2024-07-02 PROCEDURE — 2550000001 HC RX 255 CONTRASTS: Performed by: REGISTERED NURSE

## 2024-07-02 PROCEDURE — 71045 X-RAY EXAM CHEST 1 VIEW: CPT

## 2024-07-02 PROCEDURE — 96375 TX/PRO/DX INJ NEW DRUG ADDON: CPT

## 2024-07-02 PROCEDURE — 87086 URINE CULTURE/COLONY COUNT: CPT | Mod: ELYLAB | Performed by: REGISTERED NURSE

## 2024-07-02 RX ORDER — ONDANSETRON HYDROCHLORIDE 2 MG/ML
4 INJECTION, SOLUTION INTRAVENOUS ONCE
Status: COMPLETED | OUTPATIENT
Start: 2024-07-02 | End: 2024-07-02

## 2024-07-02 RX ORDER — MORPHINE SULFATE 4 MG/ML
4 INJECTION, SOLUTION INTRAMUSCULAR; INTRAVENOUS ONCE
Status: COMPLETED | OUTPATIENT
Start: 2024-07-02 | End: 2024-07-02

## 2024-07-02 RX ORDER — DICYCLOMINE HYDROCHLORIDE 20 MG/1
20 TABLET ORAL 4 TIMES DAILY PRN
Qty: 20 TABLET | Refills: 0 | Status: SHIPPED | OUTPATIENT
Start: 2024-07-02 | End: 2024-07-12

## 2024-07-02 RX ORDER — CEFDINIR 300 MG/1
300 CAPSULE ORAL 2 TIMES DAILY
Qty: 20 CAPSULE | Refills: 0 | Status: SHIPPED | OUTPATIENT
Start: 2024-07-02 | End: 2024-07-12

## 2024-07-02 ASSESSMENT — LIFESTYLE VARIABLES
HAVE YOU EVER FELT YOU SHOULD CUT DOWN ON YOUR DRINKING: NO
EVER FELT BAD OR GUILTY ABOUT YOUR DRINKING: NO
EVER HAD A DRINK FIRST THING IN THE MORNING TO STEADY YOUR NERVES TO GET RID OF A HANGOVER: NO
TOTAL SCORE: 0
HAVE PEOPLE ANNOYED YOU BY CRITICIZING YOUR DRINKING: NO

## 2024-07-02 ASSESSMENT — PAIN SCALES - GENERAL
PAINLEVEL_OUTOF10: 0 - NO PAIN
PAINLEVEL_OUTOF10: 4
PAINLEVEL_OUTOF10: 7
PAINLEVEL_OUTOF10: 7

## 2024-07-02 ASSESSMENT — COLUMBIA-SUICIDE SEVERITY RATING SCALE - C-SSRS
2. HAVE YOU ACTUALLY HAD ANY THOUGHTS OF KILLING YOURSELF?: NO
6. HAVE YOU EVER DONE ANYTHING, STARTED TO DO ANYTHING, OR PREPARED TO DO ANYTHING TO END YOUR LIFE?: NO
1. IN THE PAST MONTH, HAVE YOU WISHED YOU WERE DEAD OR WISHED YOU COULD GO TO SLEEP AND NOT WAKE UP?: NO

## 2024-07-02 ASSESSMENT — PAIN - FUNCTIONAL ASSESSMENT
PAIN_FUNCTIONAL_ASSESSMENT: 0-10

## 2024-07-02 ASSESSMENT — PAIN DESCRIPTION - PROGRESSION: CLINICAL_PROGRESSION: RESOLVED

## 2024-07-02 NOTE — TELEPHONE ENCOUNTER
Spoke with patient in regards to concerns below. Patient rescheduled with Dr. Winn on 07/11/2024 as well as discussed concerns described below. Patient states she is afraid to eat d/t the pain. Writer advised attempting BRAT diet, as well as to go to the ED if pain became unbearable. Patient expresses understanding.       ----- Message from Savi Govea sent at 7/2/2024 10:15 AM EDT -----  Regarding: Scopes  Contact: [phone]  Good morning, can you please have your assistant call me about rescheduling my scope? I need to see if I can get in earlier. My symptoms are getting much worse since my visit. I had specifically requested [place] but am willing to go to any of your locations to get in sooner. Also, is there anything I can do in the meantime to help? I’ve been much worse, I vomited 3 times Sunday, again yesterday and today and I’m struggling to eat. I need to eat at least to take my meds. I have a few that require that I take with food.   Thank you  
FAMILY HISTORY:  No pertinent family history in first degree relatives

## 2024-07-02 NOTE — ED PROVIDER NOTES
"HPI   Chief Complaint   Patient presents with    Abdominal Pain     'Here for stomach pain that has gotten worse over the last week or two.  N/V/D.\"         History provided by:  Patient   used: No      64-year-old female presents emergency department today for evaluation of abdominal pain.  Patient tells me approximately 1 hour prior to arrival she started to have a sharp pain across her lower abdomen.  Patient tells me she has been battling with nausea vomiting diarrhea and abdominal pain for 2 years.  She tells me typically her abdominal pain is in individual spaces in the right, left lower quadrants and in the suprapubic area.  She tells me that typically the it is not a band across her lower abdomen.  Patient tells me she is scheduled to have a colonoscopy next week regarding her diarrhea and pain however when she called this morning to see if she could be scoped sooner they told her to come to the emergency department today.  Patient currently rates her pain a 4/10 on the pain scale.  Patient does endorse diarrhea but she tells me that she typically has diarrhea over the last 2 years.  Patient also tells me she vomited this morning which again is normal for her but usually after she eats something..  She tells me she continues to be on a feeding despite trying to eat a granola bar this morning.                  Nik Coma Scale Score: 15                     Patient History   Past Medical History:   Diagnosis Date    Abnormal weight gain     Weight gain, abnormal    Arrhythmia     Arthritis     Diabetes mellitus (Multi)     Hypertension     Irregular heart beat     Irritable bowel syndrome     Joint pain     Nephrolithiasis     Other abnormal glucose     Abnormal glucose    Other conditions influencing health status     Neck sprain and strain    Personal history of other diseases of the circulatory system     History of hypertension    Personal history of other diseases of the " musculoskeletal system and connective tissue     History of degenerative disc disease    Radiculopathy, lumbosacral region     Lumbosacral neuritis    Seasonal allergies     Sprain of ligaments of thoracic spine, initial encounter     Thoracic sprain and strain    Urinary tract infection     Wears glasses      Past Surgical History:   Procedure Laterality Date    CARPAL TUNNEL RELEASE      DILATION AND CURETTAGE OF UTERUS      ENDOMETRIAL ABLATION      HAND SURGERY  2015    Hand Surgery                                                                                                                                                          JOINT REPLACEMENT Right     KNEE ARTHROSCOPY W/ DEBRIDEMENT  2015    Arthroscopy Knee Right    LIPOMA RESECTION Left     left arm    TONSILLECTOMY  2015    Tonsillectomy    TUBAL LIGATION  2015    Tubal Ligation     Family History   Problem Relation Name Age of Onset    Uterine cancer Mother      Hypertension Father      Thyroid cancer Sister      Diabetes Brother      Graves' disease Brother      Multiple sclerosis Brother      Diabetes Maternal Grandmother      Skin cancer Maternal Grandmother      Hypertension Maternal Grandfather      Diabetes Paternal Grandfather      Hypertension Paternal Grandfather       Social History     Tobacco Use    Smoking status: Former     Current packs/day: 0.00     Average packs/day: 0.5 packs/day for 10.0 years (5.0 ttl pk-yrs)     Types: Cigarettes     Start date:      Quit date:      Years since quittin.5    Smokeless tobacco: Never   Vaping Use    Vaping status: Never Used   Substance Use Topics    Alcohol use: Never    Drug use: Never       Physical Exam   ED Triage Vitals [24 1146]   Temperature Heart Rate Respirations BP   36 °C (96.8 °F) 80 (!) 22 144/82      Pulse Ox Temp Source Heart Rate Source Patient Position   97 % Temporal Monitor Sitting      BP Location FiO2 (%)     Right arm --        Physical Exam  Vitals and nursing note reviewed.   Constitutional:       Appearance: She is well-developed. She is obese.   Cardiovascular:      Rate and Rhythm: Normal rate and regular rhythm.   Pulmonary:      Effort: Pulmonary effort is normal.      Breath sounds: Normal breath sounds.   Abdominal:      General: There is no distension.      Palpations: Abdomen is soft.      Tenderness: There is abdominal tenderness in the periumbilical area.   Skin:     General: Skin is warm and dry.      Capillary Refill: Capillary refill takes less than 2 seconds.   Neurological:      Mental Status: She is alert.         ED Course & MDM   Diagnoses as of 07/02/24 1421   Cystitis with hematuria   Diverticulosis       Medical Decision Making  Patient seen examined emergency department; patient is healthy nontoxic appearance and appearing acute distress.  Patient's abdomen is soft and round.  Patient does complain of tenderness with palpation in the left and right lower quadrants.  Bowel sounds are normal active.  Patient skin is warm and dry no diaphoresis.  Pulses are palpable.  Lung sounds are clear bilaterally without any adventitious noise.    Obtain CT of the abdomen pelvis to evaluate for obstruction, perforation, or other abnormalities requiring surgical intervention.  Will also order chest x-ray, EKG and troponin to rule out acute ACS as extremity factor to her symptoms.  Also obtain CBC, CMP, lipase.  Patient declined offer pain and nausea medication although I did encourage her to notify myself or the bedside nurse if this does change.  Will order IV fluids given that she has been having diarrhea.  Patient is in agreement with this.    EKG at 12:49 with ventricular rate of 77, as interpreted by me, shows a normal rate and rhythm, normal axis, normal intervals. And normal ST and T wave pattern with no evidence of acute ischemia or other acute findings    Patient's high sensitive troponin is negative.  Patient CMP  significant for hyperglycemia with a glucose of 216.  Lipase is 28.  CBC unremarkable on my review.  CT of the abdomen pelvis without evidence of bowel obstruction, free air, or free fluid.  Patient does have mild colonic diverticulosis without diverticulitis.  No obstructive calculi in the kidneys bilaterally.  Patient's urinalysis with large amount of leukocytes and large amount of blood.    Upon reevaluation patient tells me she is feeling better post administration medications.  Patient discussed her diagnostic results including cystitis with hematuria.  Diverticulosis.  Patient tells me she has never been diagnosed with diverticulosis before.  Patient provided education on what this means.  Patient be discharged home with prescription for cefdinir as this is what she has been on previously for cystitis per chart review.  Patient also discharged home with prescription for Bentyl for abdominal pain and cramping.  Patient declined offer of prescription for Zofran she tells me she has this at home.  Patient encouraged to keep her appointment with gastroenterology next week for her scope.  All patient's questions and concerns were addressed prior to discharge. Patient discharged home in stable condition.    Labs Reviewed   CBC WITH AUTO DIFFERENTIAL - Abnormal       Result Value    WBC 10.5      nRBC 0.0      RBC 5.48 (*)     Hemoglobin 14.0      Hematocrit 43.8      MCV 80      MCH 25.5 (*)     MCHC 32.0      RDW 15.5 (*)     Platelets 331      Neutrophils % 79.0      Immature Granulocytes %, Automated 0.5      Lymphocytes % 12.6      Monocytes % 6.1      Eosinophils % 1.0      Basophils % 0.8      Neutrophils Absolute 8.32 (*)     Immature Granulocytes Absolute, Automated 0.05      Lymphocytes Absolute 1.33      Monocytes Absolute 0.64      Eosinophils Absolute 0.11      Basophils Absolute 0.08     COMPREHENSIVE METABOLIC PANEL - Abnormal    Glucose 216 (*)     Sodium 137      Potassium 3.9      Chloride 104       Bicarbonate 21      Anion Gap 16      Urea Nitrogen 12      Creatinine 0.80      eGFR 83      Calcium 9.3      Albumin 4.1      Alkaline Phosphatase 79      Total Protein 7.1      AST 16      Bilirubin, Total 0.5      ALT 21     URINALYSIS WITH REFLEX CULTURE AND MICROSCOPIC - Abnormal    Color, Urine Yellow      Appearance, Urine Turbid (*)     Specific Gravity, Urine 1.028      pH, Urine 5.5      Protein, Urine 50 (1+) (*)     Glucose, Urine OVER (4+) (*)     Blood, Urine OVER (3+) (*)     Ketones, Urine 20 (1+) (*)     Bilirubin, Urine NEGATIVE      Urobilinogen, Urine Normal      Nitrite, Urine NEGATIVE      Leukocyte Esterase, Urine 500 Renzo/µL (*)    MICROSCOPIC ONLY, URINE - Abnormal    WBC, Urine >50 (*)     RBC, Urine >20 (*)     Squamous Epithelial Cells, Urine 1-9 (SPARSE)      Bacteria, Urine 1+ (*)     Mucus, Urine 1+     LIPASE - Normal    Lipase 28      Narrative:     Venipuncture immediately after or during the administration of Metamizole may lead to falsely low results. Testing should be performed immediately prior to Metamizole dosing.   TROPONIN I, HIGH SENSITIVITY - Normal    Troponin I, High Sensitivity 4      Narrative:     Less than 99th percentile of normal range cutoff-  Female and children under 18 years old <14 ng/L; Male <21 ng/L: Negative  Repeat testing should be performed if clinically indicated.     Female and children under 18 years old 14-50 ng/L; Male 21-50 ng/L:  Consistent with possible cardiac damage and possible increased clinical   risk. Serial measurements may help to assess extent of myocardial damage.     >50 ng/L: Consistent with cardiac damage, increased clinical risk and  myocardial infarction. Serial measurements may help assess extent of   myocardial damage.      NOTE: Children less than 1 year old may have higher baseline troponin   levels and results should be interpreted in conjunction with the overall   clinical context.     NOTE: Troponin I testing is performed  using a different   testing methodology at [facility] than at other   [facility]. Direct result comparisons should only   be made within the same method.   URINE CULTURE   URINALYSIS WITH REFLEX CULTURE AND MICROSCOPIC    Narrative:     The following orders were created for panel order Urinalysis with Reflex Culture and Microscopic.  Procedure                               Abnormality         Status                     ---------                               -----------         ------                     Urinalysis with Reflex C...[[id number]]  Abnormal            Final result               Extra Urine Gray Tube[[id number]]                            In process                   Please view results for these tests on the individual orders.   EXTRA URINE GRAY TUBE       CT abdomen pelvis w IV contrast   Final Result   1. No evidence of bowel obstruction, free intraperitoneal air or   abnormal intra-abdominal fluid collection.   2. Mild colonic diverticulosis, without evidence of acute   diverticulitis.   3. Nonobstructive calculi in the kidneys bilaterally.        MACRO:   None        Signed by: Stoney Garner 7/2/2024 1:58 PM   Dictation workstation:   [id number]      XR chest 1 view   Final Result   No acute cardiopulmonary disease.        Signed by: Hussain Victoria 7/2/2024 1:35 PM   Dictation workstation:   [id number]            Procedure  Procedures     Poppy Quach, TUCKER-CNP  07/02/24 6941

## 2024-07-03 LAB
BACTERIA UR CULT: NORMAL
HOLD SPECIMEN: NORMAL

## 2024-07-03 RX ORDER — SODIUM, POTASSIUM,MAG SULFATES 17.5-3.13G
SOLUTION, RECONSTITUTED, ORAL ORAL
Qty: 1 EACH | Refills: 0 | Status: SHIPPED | OUTPATIENT
Start: 2024-07-03

## 2024-07-11 ENCOUNTER — APPOINTMENT (OUTPATIENT)
Dept: GASTROENTEROLOGY | Facility: HOSPITAL | Age: 64
End: 2024-07-11
Payer: COMMERCIAL

## 2024-07-11 LAB
ATRIAL RATE: 77 BPM
P AXIS: 40 DEGREES
P OFFSET: 197 MS
P ONSET: 136 MS
PR INTERVAL: 158 MS
Q ONSET: 215 MS
QRS COUNT: 12 BEATS
QRS DURATION: 82 MS
QT INTERVAL: 404 MS
QTC CALCULATION(BAZETT): 457 MS
QTC FREDERICIA: 439 MS
R AXIS: -21 DEGREES
T AXIS: 22 DEGREES
T OFFSET: 417 MS
VENTRICULAR RATE: 77 BPM

## 2024-07-17 RX ORDER — ONDANSETRON HYDROCHLORIDE 2 MG/ML
4 INJECTION, SOLUTION INTRAVENOUS ONCE AS NEEDED
Status: CANCELLED | OUTPATIENT
Start: 2024-07-17

## 2024-07-18 ENCOUNTER — ANESTHESIA (OUTPATIENT)
Dept: GASTROENTEROLOGY | Facility: HOSPITAL | Age: 64
End: 2024-07-18
Payer: COMMERCIAL

## 2024-07-18 ENCOUNTER — HOSPITAL ENCOUNTER (OUTPATIENT)
Dept: GASTROENTEROLOGY | Facility: HOSPITAL | Age: 64
Discharge: HOME | End: 2024-07-18
Payer: COMMERCIAL

## 2024-07-18 ENCOUNTER — ANESTHESIA EVENT (OUTPATIENT)
Dept: GASTROENTEROLOGY | Facility: HOSPITAL | Age: 64
End: 2024-07-18
Payer: COMMERCIAL

## 2024-07-18 VITALS
DIASTOLIC BLOOD PRESSURE: 61 MMHG | WEIGHT: 238.1 LBS | OXYGEN SATURATION: 98 % | TEMPERATURE: 96.6 F | HEART RATE: 78 BPM | BODY MASS INDEX: 39.62 KG/M2 | SYSTOLIC BLOOD PRESSURE: 133 MMHG | RESPIRATION RATE: 20 BRPM

## 2024-07-18 DIAGNOSIS — K57.90 DIVERTICULOSIS: ICD-10-CM

## 2024-07-18 DIAGNOSIS — K52.9 COLITIS: Primary | ICD-10-CM

## 2024-07-18 DIAGNOSIS — R10.84 GENERALIZED ABDOMINAL PAIN: ICD-10-CM

## 2024-07-18 LAB — GLUCOSE BLD MANUAL STRIP-MCNC: 225 MG/DL (ref 74–99)

## 2024-07-18 PROCEDURE — 82947 ASSAY GLUCOSE BLOOD QUANT: CPT

## 2024-07-18 PROCEDURE — 45380 COLONOSCOPY AND BIOPSY: CPT | Performed by: STUDENT IN AN ORGANIZED HEALTH CARE EDUCATION/TRAINING PROGRAM

## 2024-07-18 PROCEDURE — 7100000010 HC PHASE TWO TIME - EACH INCREMENTAL 1 MINUTE: Performed by: STUDENT IN AN ORGANIZED HEALTH CARE EDUCATION/TRAINING PROGRAM

## 2024-07-18 PROCEDURE — 2500000005 HC RX 250 GENERAL PHARMACY W/O HCPCS: Performed by: ANESTHESIOLOGY

## 2024-07-18 PROCEDURE — 3700000001 HC GENERAL ANESTHESIA TIME - INITIAL BASE CHARGE: Performed by: STUDENT IN AN ORGANIZED HEALTH CARE EDUCATION/TRAINING PROGRAM

## 2024-07-18 PROCEDURE — 7100000009 HC PHASE TWO TIME - INITIAL BASE CHARGE: Performed by: STUDENT IN AN ORGANIZED HEALTH CARE EDUCATION/TRAINING PROGRAM

## 2024-07-18 PROCEDURE — 2500000004 HC RX 250 GENERAL PHARMACY W/ HCPCS (ALT 636 FOR OP/ED): Performed by: ANESTHESIOLOGY

## 2024-07-18 PROCEDURE — 2500000004 HC RX 250 GENERAL PHARMACY W/ HCPCS (ALT 636 FOR OP/ED): Performed by: STUDENT IN AN ORGANIZED HEALTH CARE EDUCATION/TRAINING PROGRAM

## 2024-07-18 PROCEDURE — 3700000002 HC GENERAL ANESTHESIA TIME - EACH INCREMENTAL 1 MINUTE: Performed by: STUDENT IN AN ORGANIZED HEALTH CARE EDUCATION/TRAINING PROGRAM

## 2024-07-18 PROCEDURE — 43239 EGD BIOPSY SINGLE/MULTIPLE: CPT | Performed by: STUDENT IN AN ORGANIZED HEALTH CARE EDUCATION/TRAINING PROGRAM

## 2024-07-18 RX ORDER — ONDANSETRON 4 MG/1
4 TABLET, ORALLY DISINTEGRATING ORAL EVERY 8 HOURS PRN
Qty: 9 TABLET | Refills: 3 | Status: SHIPPED | OUTPATIENT
Start: 2024-07-18

## 2024-07-18 RX ORDER — SODIUM CHLORIDE, SODIUM LACTATE, POTASSIUM CHLORIDE, CALCIUM CHLORIDE 600; 310; 30; 20 MG/100ML; MG/100ML; MG/100ML; MG/100ML
20 INJECTION, SOLUTION INTRAVENOUS CONTINUOUS
Status: DISCONTINUED | OUTPATIENT
Start: 2024-07-18 | End: 2024-07-19 | Stop reason: HOSPADM

## 2024-07-18 RX ORDER — LIDOCAINE HYDROCHLORIDE 20 MG/ML
INJECTION, SOLUTION EPIDURAL; INFILTRATION; INTRACAUDAL; PERINEURAL AS NEEDED
Status: DISCONTINUED | OUTPATIENT
Start: 2024-07-18 | End: 2024-07-18

## 2024-07-18 RX ORDER — PROPOFOL 10 MG/ML
INJECTION, EMULSION INTRAVENOUS AS NEEDED
Status: DISCONTINUED | OUTPATIENT
Start: 2024-07-18 | End: 2024-07-18

## 2024-07-18 RX ORDER — PREDNISONE 20 MG/1
TABLET ORAL
Qty: 35 TABLET | Refills: 0 | Status: SHIPPED | OUTPATIENT
Start: 2024-07-18 | End: 2024-08-15

## 2024-07-18 RX ORDER — ONDANSETRON HYDROCHLORIDE 2 MG/ML
INJECTION, SOLUTION INTRAVENOUS AS NEEDED
Status: DISCONTINUED | OUTPATIENT
Start: 2024-07-18 | End: 2024-07-18

## 2024-07-18 RX ORDER — DICYCLOMINE HYDROCHLORIDE 20 MG/1
20 TABLET ORAL 4 TIMES DAILY PRN
Qty: 359.6 TABLET | Refills: 3 | Status: SHIPPED | OUTPATIENT
Start: 2024-07-18

## 2024-07-18 RX ORDER — LABETALOL HYDROCHLORIDE 5 MG/ML
INJECTION, SOLUTION INTRAVENOUS AS NEEDED
Status: DISCONTINUED | OUTPATIENT
Start: 2024-07-18 | End: 2024-07-18

## 2024-07-18 RX ORDER — GLYCOPYRROLATE 0.2 MG/ML
INJECTION INTRAMUSCULAR; INTRAVENOUS AS NEEDED
Status: DISCONTINUED | OUTPATIENT
Start: 2024-07-18 | End: 2024-07-18

## 2024-07-18 RX ORDER — DICYCLOMINE HYDROCHLORIDE 20 MG/1
20 TABLET ORAL 4 TIMES DAILY PRN
Qty: 359.6 TABLET | Refills: 3 | Status: SHIPPED | OUTPATIENT
Start: 2024-07-18 | End: 2024-07-18

## 2024-07-18 SDOH — HEALTH STABILITY: MENTAL HEALTH: CURRENT SMOKER: 0

## 2024-07-18 ASSESSMENT — COLUMBIA-SUICIDE SEVERITY RATING SCALE - C-SSRS
6. HAVE YOU EVER DONE ANYTHING, STARTED TO DO ANYTHING, OR PREPARED TO DO ANYTHING TO END YOUR LIFE?: NO
2. HAVE YOU ACTUALLY HAD ANY THOUGHTS OF KILLING YOURSELF?: NO
1. IN THE PAST MONTH, HAVE YOU WISHED YOU WERE DEAD OR WISHED YOU COULD GO TO SLEEP AND NOT WAKE UP?: NO

## 2024-07-18 ASSESSMENT — PAIN SCALES - GENERAL
PAINLEVEL_OUTOF10: 0 - NO PAIN
PAINLEVEL_OUTOF10: 0 - NO PAIN
PAINLEVEL_OUTOF10: 2

## 2024-07-18 ASSESSMENT — PAIN - FUNCTIONAL ASSESSMENT
PAIN_FUNCTIONAL_ASSESSMENT: 0-10

## 2024-07-18 ASSESSMENT — PAIN DESCRIPTION - DESCRIPTORS: DESCRIPTORS: CRAMPING

## 2024-07-18 NOTE — DISCHARGE INSTRUCTIONS
Patient Instructions after a Colonoscopy      The anesthetics, sedatives or narcotics which were given to you today will be acting in your body for the next 24 hours, so you might feel a little sleepy or groggy.  This feeling should slowly wear off. Carefully read and follow the instructions.     You received sedation today:  - Do not drive or operate any machinery or power tools of any kind.   - No alcoholic beverages today, not even beer or wine.  - Do not make any important decisions or sign any legal documents.  - No over the counter medications that contain alcohol or that may cause drowsiness.  - Do not make any important decisions or sign any legal documents.    While it is common to experience mild to moderate abdominal distention, gas, or belching after your procedure, if any of these symptoms occur following discharge from the GI Lab or within one week of having your procedure, call the [facility] to be advised whether a visit to your nearest Urgent Care or Emergency Department is indicated.  Take this paper with you if you go.     - If you develop an allergic reaction to the medications that were given during your procedure such as difficulty breathing, rash, hives, severe nausea, vomiting or lightheadedness.  - If you experience chest pain, shortness of breath, severe abdominal pain, fevers and chills.  -If you develop signs and symptoms of bleeding such as blood in your spit, if your stools turn black, tarry, or bloody  - If you have not urinated within 8 hours following your procedure.  - If your IV site becomes painful, red, inflamed, or looks infected.    If you received a biopsy/polypectomy/sphincterotomy the following instructions apply below:    __ Do not use Aspirin containing products, non-steroidal medications or anti-coagulants for one week following your procedure. (Examples of these types of medications are: Advil, Arthrotec, Aleve, Coumadin, Ecotrin, Heparin, Ibuprofen,  Indocin, Motrin, Naprosyn, Nuprin, Plavix, Vioxx, and Voltarin, or their generic forms.  This list is not all-inclusive.  Check with your physician or pharmacist before resuming medications.)   __ Eat a soft diet today.  Avoid foods that are poorly digested for the next 24 hours.  These foods would include: nuts, beans, lettuce, red meats, and fried foods. Start with liquids and advance your diet as tolerated, gradually work up to eating solids.   __ Do not have a Barium Study or Enema for one week.    Your physician recommends the additional following instructions:    -You have a contact number available for emergencies. The signs and symptoms of potential delayed complications were discussed with you. You may return to normal activities tomorrow.  -Resume your previous diet.  -Continue your present medications.   -We are waiting for your pathology results.  -Your physician has recommended a repeat colonoscopy (date to be determined after pending pathology results are reviewed) for surveillance based on pathology results.  -The findings and recommendations have been discussed with you.  -The findings and recommendations were discussed with your family.  - Please see Medication Reconciliation Form for new medication/medications prescribed.     Upper GI Endoscopy Discharge Instructions    About this topic  This procedure is done to view your upper gastrointestinal (GI) tract. This includes your throat and food pipe (esophagus). It also includes your stomach and the first part of the small bowel. Some people have this test for problems like coughing or throwing up blood. Other people may be having bad belly pain or blood in their stool. You may be having trouble swallowing or problems with acid reflux.  Doctors often use this test to look for problems like:  Ulcers  Cancer or tumor growths  Internal bleeding  Swelling  Inflammation  Infection  Rizvi's esophagus  Gastroesophageal reflux disease or GERD  Swallowing  problems    What care is needed at home?  Ask your doctor what you need to do when you go home. Make sure you ask questions if you do not understand what the doctor says. This way you will know what you need to do.  Your throat may feel sore. Your belly may also feel bloated. Your doctor may give you drugs to help with pain.  Talk to your doctor about when it is safe for you to go back to eating your normal diet and taking your drugs. You can drink fluid once the numbing drugs in your throat wear off.  What follow-up care is needed?  Your doctor may ask you to make visits to the office to check on your progress. Be sure to keep these visits.  The results of this test may help your doctor understand what kind of problem you have with your upper GI tract. Together you can make a plan for more care.  What drugs may be needed?  The doctor may order drugs to:  Help with pain  Decrease the acid in your stomach  Will physical activity be limited?  You may need to limit your activity for the rest of the day. After that, you will likely be able to go back to your normal activities.  What changes to diet are needed?  Soft foods like pudding or soups may be easier to eat at first. Ask your doctor if you need to make any changes to your diet.  What problems could happen?  Painful swallowing  Upset stomach  Injury to food pipe  Throwing up  Tear in the esophagus  When do I need to call the doctor?  Signs of infection. These include a fever of 100.4°F (38°C) or higher, chills.  Very bad belly pain  Throwing up blood  Your belly is hard and swollen  Trouble swallowing or breathing  Upset stomach and throwing up  Bloody or black tarry stools  Cough, shortness of breath, or chest pain  A crunching feeling under the skin in your neck  You are not feeling better in 2 to 3 days or you are feeling worse  Teach Back: Helping You Understand  The Teach Back Method helps you understand the information we are giving you. After you talk with  the staff, tell them in your own words what you learned. This helps to make sure the staff has described each thing clearly. It also helps to explain things that may have been confusing. Before going home, make sure you can do these:  I can tell you about my procedure.  I can tell you what changes I need to make with my diet or drugs.  I can tell you what I will do if I have very bad belly pain, throwing up blood, or my belly is hard and swollen.  Where can I learn more?  [employer]  https://www.gastro.org/practice-guidance/gi-patient-center/topic/upper-gi-endoscopy  Last Reviewed Date    Moderate Sedation in Adults Discharge Instructions    About this topic  Moderate sedation is also known as conscious sedation. It changes your state of being awake or consciousness. With this sedation, you may feel slight pain or pressure during a procedure. The drugs help you to relax and may even allow you to sleep. It will be easy to wake you and you may talk and answer questions while under sedation. Most likely, you will not remember what happens while under this sedation.  What care is needed at home?  Ask your doctor what you need to do when you go home. Make sure you understand everything the doctor says. This way you will know what you need to do.  You will not be allowed to drive right away after the procedure. Ask a family member or a friend to drive you home.  Do not operate heavy or dangerous machinery for at least 24 hours.  Do not make major decisions or sign important papers for at least 24 hours. You may not be thinking clearly.  Avoid beer, wine, or mixed drinks (alcohol) for at least 24 hours.  You are at a higher risk of falling for at least 24 hours after moderate sedation.  Take extra care when you get up.  Do not change positions quickly.  Do not rush when you need to go to the bathroom or to answer the phone.  Ask for help if you feel unsteady when you try to walk.  Wear shoes  with non-slip soles and low heels.  What follow-up care is needed?  Your doctor may ask you to make visits to the office to check on your progress. Be sure to keep these visits. Your doctor may also refer you to other doctors or tell you that you need more tests or care.  What drugs may be needed?  The doctor may order drugs to:  Help with pain  Treat an upset stomach or throwing up  Will physical activity be limited?  Rest for the day of the procedure. Avoid strenuous activities like heavy lifting and hard exercise. Talk to your doctor about whether you need to limit lifting or exercise after your procedure.  What changes to diet are needed?  Start with a light diet when you are fully awake. This includes things that are easy to swallow like soups, pudding, jello, toast, and eggs. Slowly progress to your normal diet.  What problems could happen?  Low blood pressure  Breathing problems  Upset stomach or throwing up  Dizziness  When do I need to call the doctor?  Feel dizzy, weak, or tired  Faint  Very bad headache  Upset stomach or throwing up  To follow up for more tests or care  Teach Back: Helping You Understand  The Teach Back Method helps you understand the information we are giving you. After you talk with the staff, tell them in your own words what you learned. This helps to make sure the staff has described each thing clearly. It also helps to explain things that may have been confusing. Before going home, make sure you can do these:  I can tell you about my procedure.  I can tell you if I need more tests or care.  I can tell you what is good for me to eat and drink the next day.  I can tell you what I would do if I feel dizzy, weak, or tired.  Last Reviewed Date  2020-03-02

## 2024-07-18 NOTE — H&P
Procedure H&P    Patient Profile-Procedures  Name Savi Govea  Date of Birth 1960  MRN [id number]  Address   [address] [facility] [id number] [facility] [address]    Primary Phone Number [phone]  Secondary Phone Number    PCP Mary Cortez    Procedure(s):  Procedures: EGD and Colonoscopy  Primary contact name and number   Extended Emergency Contact Information  Primary Emergency Contact: Sana Cortez  Address: [address] [place] [address] [place] [address] [facility]  Mobile Phone: [phone]  Relation: Daughter    General Health  Weight   Vitals:    07/18/24 0829   Weight: 108 kg (238 lb 1.6 oz)     BMI Body mass index is 39.62 kg/m².    Allergies  Allergies   Allergen Reactions    Aspirin GI Upset     Jittery feeling    Caffeine Unknown    Cefditoren Unknown    Cetirizine Other     Leg cramps    Fexofenadine Other     Leg cramps    Phenylephrine-Guaifenesin Other     Patient states that she gets very hyper and cannot sleep.    Pseudoephedrine-Guaifenesin Unknown    Salicylates Other    Spectracef [Cefditoren Pivoxil] Unknown       Past Medical History   Past Medical History:   Diagnosis Date    Abnormal weight gain     Weight gain, abnormal    Arrhythmia     Arthritis     COVID-19     VACCINATED    Diabetes mellitus (Multi)     Hyperlipidemia     Hypertension     Irregular heart beat     Irritable bowel syndrome     Joint pain     Nephrolithiasis     Other abnormal glucose     Abnormal glucose    Other conditions influencing health status     Neck sprain and strain    Personal history of other diseases of the circulatory system     History of hypertension    Personal history of other diseases of the musculoskeletal system and connective tissue     History of degenerative disc disease    Radiculopathy, lumbosacral region     Lumbosacral neuritis    Seasonal allergies     Sprain of ligaments of thoracic spine, initial encounter     Thoracic sprain and strain     Type 2 diabetes mellitus (Multi)     Urinary tract infection     Wears glasses        Provider assessment  Diagnosis:  diarrhea, Colon Cancer Screening/Surveillance , and Abdominal Pain. Patient continues to experience abdominal pain similar to pain during clinic visit and recent ER visit where CT AP showed no intraabdominal pathology. Previously responded to Bentyl    Medication Reviewed - yes  Prior to Admission medications    Medication Sig Start Date End Date Taking? Authorizing Provider   alpha lipoic acid 300 mg capsule Take 1 capsule by mouth 2 times a day.   Yes Historical Provider, MD   ammonium lactate (Lac-Hydrin) 12 % lotion Apply topically once daily as needed. As directed 8/29/23  Yes Historical Provider, MD   ascorbic acid (Vitamin C) 1,000 mg tablet Take 1 tablet (1,000 mg) by mouth once daily.   Yes Historical Provider, MD   calcium citrate-vitamin D3 200 mg-3.125 mcg (125 unit) tablet Take 1 tablet by mouth once daily. 3/19/15  Yes Historical Provider, MD   CHROMIUM PICOLINATE ORAL Take 1 tablet by mouth once daily.   Yes Historical Provider, MD   Cinnamon 500 mg capsule Take 1 capsule (500 mg) by mouth 2 times a day. 10/2/19  Yes Historical Provider, MD   glimepiride (Amaryl) 4 mg tablet Take 1 tablet (4 mg) by mouth 2 times a day.   Yes Historical Provider, MD   irbesartan (Avapro) 300 mg tablet Take 1 tablet (300 mg) by mouth once daily. 10/2/19  Yes Historical Provider, MD   Januvia 100 mg tablet Take 1 tablet (100 mg) by mouth once daily. 8/3/23  Yes Historical Provider, MD   Jardiance 25 mg Take 1 tablet (25 mg) by mouth once daily.   Yes Historical Provider, MD   loratadine (Claritin) 10 mg tablet Take 1 tablet (10 mg) by mouth once daily. 10/2/19  Yes Historical Provider, MD   magnesium oxide 400 mg magnesium capsule Take 1 capsule (400 mg) by mouth once daily.   Yes Historical Provider, MD   meloxicam (Mobic) 15 mg tablet Take 1 tablet (15 mg) by mouth once daily. 12/16/14  Yes Historical  Provider, MD   metFORMIN (Glucophage) 1,000 mg tablet Take 1 tablet (1,000 mg) by mouth 2 times daily (morning and late afternoon).   Yes Historical Provider, MD   metoprolol succinate XL (Kapspargo Sprinkle) 100 mg 24 hr capsule Take 1 capsule (100 mg) by mouth once daily. 10/2/19  Yes Historical Provider, MD   multivitamin tablet Take 1 tablet by mouth once daily.   Yes Historical Provider, MD   OneTouch Delica Plus Lancet 30 gauge misc  8/28/23  Yes Historical Provider, MD   OneTouch Verio test strips strip  8/28/23  Yes Historical Provider, MD   pomegranate fruit extract 250 mg capsule Take 1 capsule by mouth 2 times a day.   Yes Historical Provider, MD   potassium citrate 99 mg capsule Take 99 mg by mouth once daily.   Yes Historical Provider, MD   saccharomyces boulardii (Florastor) 250 mg capsule Take 1 capsule (250 mg) by mouth 2 times a day.   Yes Historical Provider, MD   sodium,potassium,mag sulfates (Suprep) 17.5-3.13-1.6 gram recon soln solution Take one bottle twice as directed by the prep instructions 7/3/24  Yes Melly Winn MD   Soma 350 mg tablet Take 1 tablet (350 mg) by mouth once daily.   Yes Historical Provider, MD   zinc gluconate 50 mg tablet Take 1 tablet (50 mg) by mouth once daily.   Yes Historical Provider, MD   cefdinir (Omnicef) 300 mg capsule Take 1 capsule (300 mg) by mouth 2 times a day for 10 days. 7/2/24 7/12/24  GARDENIA Garcia   dicyclomine (Bentyl) 20 mg tablet Take 1 tablet (20 mg) by mouth 4 times a day as needed (Abdominal cramping or diarrhea) for up to 10 days. 7/2/24 7/12/24  GARDENIA Garcia   rivaroxaban (Xarelto) 10 mg tablet Take 1 tablet (10 mg) by mouth once daily.  Patient not taking: Reported on 7/18/2024 1/6/24 2/5/24  Sylvia Mathis MD   APPLE CIDER VINEGAR ORAL Take 2 capsules by mouth 2 times a day.  7/16/24  Historical Provider, MD   fluticasone (Flonase) 50 mcg/actuation nasal spray Administer 1 spray into each nostril  once daily as needed. 8/29/23 7/16/24  Historical Provider, MD       Physical Exam  Vitals:    07/18/24 0829   BP: 157/83   Pulse: 73   Resp: 18   Temp: 35.8 °C (96.4 °F)   SpO2: 99%        General: A&Ox3, NAD.  HEENT: AT/NC.   CV: RRR. No murmur.  Resp: CTA bilaterally. No wheezing, rhonchi or rales.   GI: Soft,  Mild TTP in the mid abdomen with no rebound or guarding, no distention   Extrem: No edema. Pulses intact.  Neuro: No focal deficits.   Psych: Normal mood and affect.      Procedure Plan - pre-procedural (re)assesment completed by physician:  discharge/transfer patient when discharge criteria met    Melly Winn MD  7/18/2024 8:42 AM

## 2024-07-18 NOTE — Clinical Note
Huddle and Timeout completed together with team. Patient wristband and NAVEEN information verified.  Anesthesia safety check completed

## 2024-07-18 NOTE — ANESTHESIA PREPROCEDURE EVALUATION
Patient: Savi Govea    Procedure Information       Anesthesia Start Date/Time: 07/18/24 1003    Scheduled providers: Melly Winn MD; TUCKER Lopez-CRNA    Procedures:       COLONOSCOPY      EGD    Location: [facility]            Relevant Problems   Cardiac   (+) Essential hypertension   (+) Hyperlipidemia      Neuro   (+) Acute lumbar radiculopathy   (+) Cervical neuritis   (+) Left cervical radiculopathy      Endocrine   (+) Diabetes mellitus, type 2 (Multi)   (+) Obesity      Musculoskeletal   (+) Cervical spondylosis with radiculopathy   (+) Osteoarthritis of left hip   (+) Osteoarthritis, knee   (+) Primary osteoarthritis of right knee   (+) Unilateral primary osteoarthritis, left knee       Clinical information reviewed:   Tobacco  Allergies  Meds   Med Hx  Surg Hx   Fam Hx  Soc Hx        NPO Detail:  NPO/Void Status  Carbohydrate Drink Given Prior to Surgery? : N  Date of Last Liquid: 07/18/24  Time of Last Liquid: 0315  Date of Last Solid: 07/16/24  Time of Last Solid: 1700  Last Intake Type: Light meal  Time of Last Void: 0700         Physical Exam    Airway  Mallampati: II     Cardiovascular - normal exam  Rhythm: regular     Dental - normal exam     Pulmonary - normal exam     Abdominal - normal exam             Anesthesia Plan    History of general anesthesia?: yes  History of complications of general anesthesia?: no    ASA 2     MAC     The patient is not a current smoker.    intravenous induction   Anesthetic plan and risks discussed with patient.    Plan discussed with CRNA.

## 2024-07-18 NOTE — ANESTHESIA POSTPROCEDURE EVALUATION
Patient: Savi Govea    Procedure Summary       Date: 07/18/24 Room / Location: [facility]    Anesthesia Start: 1003 Anesthesia Stop:     Procedures:       COLONOSCOPY      EGD Diagnosis: Generalized abdominal pain    Scheduled Providers: Melly Winn MD; ISSA Lopez Responsible Provider: ISSA Lopez    Anesthesia Type: MAC ASA Status: Not recorded            Anesthesia Type: MAC    Vitals Value Taken Time   /76 07/18/24 1041   Temp 36 07/18/24 1041   Pulse 79 07/18/24 1041   Resp 16 07/18/24 1041   SpO2 99% 07/18/24 1041       Anesthesia Post Evaluation    Patient location during evaluation: bedside  Patient participation: complete - patient participated  Level of consciousness: awake and anxious  Pain management: adequate  Airway patency: patent  Cardiovascular status: acceptable, blood pressure returned to baseline and hemodynamically stable  Respiratory status: acceptable, nonlabored ventilation, spontaneous ventilation, room air and unassisted  Hydration status: acceptable  Postoperative Nausea and Vomiting: none      There were no known notable events for this encounter.

## 2024-07-18 NOTE — Clinical Note
Patient tolerated procedure well. Appears comfortable with no complaints of pain. VS stable. Arousable prior to transport. Patient transported to [facility] via cart.  Report called              . Handoff completed

## 2024-07-23 DIAGNOSIS — K57.90 DIVERTICULOSIS: ICD-10-CM

## 2024-07-23 RX ORDER — DICYCLOMINE HYDROCHLORIDE 20 MG/1
20 TABLET ORAL 4 TIMES DAILY PRN
Qty: 90 TABLET | Refills: 3 | Status: SHIPPED | OUTPATIENT
Start: 2024-07-23

## 2024-07-24 ENCOUNTER — LAB (OUTPATIENT)
Dept: LAB | Facility: LAB | Age: 64
End: 2024-07-24
Payer: COMMERCIAL

## 2024-07-24 DIAGNOSIS — R10.84 GENERALIZED ABDOMINAL PAIN: ICD-10-CM

## 2024-07-24 DIAGNOSIS — K52.9 COLITIS: ICD-10-CM

## 2024-07-24 PROCEDURE — 83993 ASSAY FOR CALPROTECTIN FECAL: CPT

## 2024-07-24 PROCEDURE — 87328 CRYPTOSPORIDIUM AG IA: CPT

## 2024-07-24 PROCEDURE — 87329 GIARDIA AG IA: CPT

## 2024-07-24 PROCEDURE — 87506 IADNA-DNA/RNA PROBE TQ 6-11: CPT

## 2024-07-25 LAB

## 2024-07-27 LAB
CRYPTOSP AG STL QL IA: NEGATIVE
G LAMBLIA AG STL QL IA: NEGATIVE

## 2024-07-28 LAB — CALPROTECTIN STL-MCNT: 105 UG/G

## 2024-07-29 LAB — O+P STL MICRO: NEGATIVE

## 2024-08-01 LAB
LABORATORY COMMENT REPORT: NORMAL
PATH REPORT.COMMENTS IMP SPEC: NORMAL
PATH REPORT.FINAL DX SPEC: NORMAL
PATH REPORT.GROSS SPEC: NORMAL
PATH REPORT.TOTAL CANCER: NORMAL

## 2024-08-02 ENCOUNTER — HOSPITAL ENCOUNTER (OUTPATIENT)
Dept: RADIOLOGY | Facility: HOSPITAL | Age: 64
Discharge: HOME | End: 2024-08-02
Payer: COMMERCIAL

## 2024-08-02 ENCOUNTER — LAB (OUTPATIENT)
Dept: LAB | Facility: LAB | Age: 64
End: 2024-08-02
Payer: COMMERCIAL

## 2024-08-02 DIAGNOSIS — K52.9 COLITIS: ICD-10-CM

## 2024-08-02 DIAGNOSIS — R10.84 GENERALIZED ABDOMINAL PAIN: ICD-10-CM

## 2024-08-02 DIAGNOSIS — K50.119 CROHN'S DISEASE OF COLON WITH COMPLICATION (MULTI): Primary | ICD-10-CM

## 2024-08-02 LAB
CRP SERPL-MCNC: 0.54 MG/DL
HBV CORE AB SER QL: NONREACTIVE
HBV SURFACE AB SER-ACNC: <3.1 MIU/ML
HBV SURFACE AG SERPL QL IA: NONREACTIVE
HCV AB SER QL: NONREACTIVE

## 2024-08-02 PROCEDURE — 86704 HEP B CORE ANTIBODY TOTAL: CPT

## 2024-08-02 PROCEDURE — 2500000005 HC RX 250 GENERAL PHARMACY W/O HCPCS: Performed by: STUDENT IN AN ORGANIZED HEALTH CARE EDUCATION/TRAINING PROGRAM

## 2024-08-02 PROCEDURE — 86481 TB AG RESPONSE T-CELL SUSP: CPT

## 2024-08-02 PROCEDURE — 2550000001 HC RX 255 CONTRASTS: Performed by: STUDENT IN AN ORGANIZED HEALTH CARE EDUCATION/TRAINING PROGRAM

## 2024-08-02 PROCEDURE — 86803 HEPATITIS C AB TEST: CPT

## 2024-08-02 PROCEDURE — 74177 CT ABD & PELVIS W/CONTRAST: CPT

## 2024-08-02 PROCEDURE — 81335 TPMT GENE COM VARIANTS: CPT

## 2024-08-02 PROCEDURE — 86140 C-REACTIVE PROTEIN: CPT

## 2024-08-02 PROCEDURE — 36415 COLL VENOUS BLD VENIPUNCTURE: CPT

## 2024-08-02 PROCEDURE — 86706 HEP B SURFACE ANTIBODY: CPT

## 2024-08-02 PROCEDURE — 87340 HEPATITIS B SURFACE AG IA: CPT

## 2024-08-04 ENCOUNTER — TELEPHONE (OUTPATIENT)
Dept: GASTROENTEROLOGY | Facility: CLINIC | Age: 64
End: 2024-08-04
Payer: COMMERCIAL

## 2024-08-04 LAB
NIL(NEG) CONTROL SPOT COUNT: NORMAL
PANEL A SPOT COUNT: 0
PANEL B SPOT COUNT: 0
POS CONTROL SPOT COUNT: NORMAL
T-SPOT. TB INTERPRETATION: NEGATIVE

## 2024-08-04 RX ORDER — ALBUTEROL SULFATE 0.83 MG/ML
3 SOLUTION RESPIRATORY (INHALATION) AS NEEDED
OUTPATIENT
Start: 2024-08-04

## 2024-08-04 RX ORDER — ACETAMINOPHEN 325 MG/1
650 TABLET ORAL ONCE
OUTPATIENT
Start: 2024-08-04

## 2024-08-04 RX ORDER — EPINEPHRINE 0.3 MG/.3ML
0.3 INJECTION SUBCUTANEOUS EVERY 5 MIN PRN
OUTPATIENT
Start: 2024-08-04

## 2024-08-04 RX ORDER — FAMOTIDINE 10 MG/ML
20 INJECTION INTRAVENOUS ONCE AS NEEDED
OUTPATIENT
Start: 2024-08-04

## 2024-08-04 RX ORDER — DIPHENHYDRAMINE HYDROCHLORIDE 50 MG/ML
50 INJECTION INTRAMUSCULAR; INTRAVENOUS AS NEEDED
OUTPATIENT
Start: 2024-08-04

## 2024-08-04 NOTE — TELEPHONE ENCOUNTER
Called patient to discuss biopsy results showing findings concerning for Crohn's disease. CTE pending but note prior CT in 2023 demonstrated findings of possible enteritis which raises concern for ileocolonic disease. Plan to initiate process for Skyrizi. Still pending TB test which is in process. Will arrange sooner appointment to further discuss any follow up concerns in clinic. She is agreeable with plan.    High risk medications: As with all patients taking immunosuppressive biologic therapies or immunomodulators, we provide a balanced discussion on benefits and risks associated with these medications. Regarding potential risks, we employ a strategy of active monitoring for medication related toxicities. Toxicities and risks discussed in monitoring include, but are not limited to the following: infusion reactions including anaphylaxis; bacterial, viral and fungal infections; pancreatitis; heart failure; neurologic reactions; hematologic and solid tumors malignancy including an increased risk of lymphoma and skin cancers; bone marrow toxicity including anemia, lymphopenia and immune suppression; hepatotoxicity and potential renal toxicity. Patients are actively assessed through routine laboratories (Q4 month or more frequently) which I personally review and are encouraged to contact us with any questions regarding new symptom development.     Melly Winn MD

## 2024-08-05 ENCOUNTER — TELEPHONE (OUTPATIENT)
Dept: GASTROENTEROLOGY | Facility: CLINIC | Age: 64
End: 2024-08-05
Payer: COMMERCIAL

## 2024-08-05 ENCOUNTER — SPECIALTY PHARMACY (OUTPATIENT)
Dept: PHARMACY | Facility: CLINIC | Age: 64
End: 2024-08-05

## 2024-08-05 NOTE — TELEPHONE ENCOUNTER
Spoke with patient regarding new diagnosis of Crohn's disease and prescribed medication, Skyrizi. Discussed its role in managing inflammation and dosing schedule. Pt was given nurses line  for any future needs.

## 2024-08-08 ENCOUNTER — DOCUMENTATION (OUTPATIENT)
Dept: INFUSION THERAPY | Facility: CLINIC | Age: 64
End: 2024-08-08
Payer: COMMERCIAL

## 2024-08-08 NOTE — PROGRESS NOTES
CLINICAL CLEARANCE FOR OUTPATIENT INFUSION      Patient to be scheduled forNew Start  of Skyrizi   For Diagnosis: Crohn Disease     Dosed: Induction    Labs…  CBC-D Drawn:    Lab Results   Component Value Date    WBC 10.5 07/02/2024    HGB 14.0 07/02/2024    HCT 43.8 07/02/2024    MCV 80 07/02/2024     07/02/2024    (baseline)  CMP Drawn:     Chemistry    Lab Results   Component Value Date/Time     07/02/2024 1219    K 3.9 07/02/2024 1219     07/02/2024 1219    CO2 21 07/02/2024 1219    BUN 12 07/02/2024 1219    CREATININE 0.80 07/02/2024 1219    Lab Results   Component Value Date/Time    CALCIUM 9.3 07/02/2024 1219    ALKPHOS 79 07/02/2024 1219    AST 16 07/02/2024 1219    ALT 21 07/02/2024 1219    BILITOT 0.5 07/02/2024 1219          (baseline)  Liver Enzymes / Bilirubin Drawn/Results:   Lab Results   Component Value Date    ALT 21 07/02/2024    AST 16 07/02/2024    ALKPHOS 79 07/02/2024    BILITOT 0.5 07/02/2024     (baseline and during treatment)    AST: 16    ALT:   Lab Results   Component Value Date    ALT 21 07/02/2024       Total Bilirubin:   Lab Results   Component Value Date    ALT 21 07/02/2024    AST 16 07/02/2024    ALKPHOS 79 07/02/2024    BILITOT 0.5 07/02/2024       WNL? Yes  (If elevated discuss with prescribing provider prior to proceeding)    TB Drawn/Results:   Lab Results   Component Value Date    TBSIN Negative 08/02/2024      (Must be negative)  Hep B / Hep C Drawn/Results:   Lab Results   Component Value Date    HEPBCAB Nonreactive 08/02/2024    HEPCAB Nonreactive 08/02/2024      Lab Results   Component Value Date    HEPBSAG Nonreactive 08/02/2024        Pregnancy test ordered prior to first infusion for patients of childbearing ability? No   (If NO please enter order)    Any history of malignancy especially skin cancer? No   (If YES discuss with prescribing provider prior to proceeding)    Past Medical History:   Diagnosis Date    Abnormal weight gain     Weight gain,  abnormal    Arrhythmia     Arthritis     COVID-19     VACCINATED    Diabetes mellitus (Multi)     Hyperlipidemia     Hypertension     Irregular heart beat     Irritable bowel syndrome     Joint pain     Nephrolithiasis     Other abnormal glucose     Abnormal glucose    Other conditions influencing health status     Neck sprain and strain    Personal history of other diseases of the circulatory system     History of hypertension    Personal history of other diseases of the musculoskeletal system and connective tissue     History of degenerative disc disease    Radiculopathy, lumbosacral region     Lumbosacral neuritis    Seasonal allergies     Sprain of ligaments of thoracic spine, initial encounter     Thoracic sprain and strain    Type 2 diabetes mellitus (Multi)     Urinary tract infection     Wears glasses       Past Medical History:   Diagnosis Date    Abnormal weight gain     Weight gain, abnormal    Arrhythmia     Arthritis     COVID-19     VACCINATED    Diabetes mellitus (Multi)     Hyperlipidemia     Hypertension     Irregular heart beat     Irritable bowel syndrome     Joint pain     Nephrolithiasis     Other abnormal glucose     Abnormal glucose    Other conditions influencing health status     Neck sprain and strain    Personal history of other diseases of the circulatory system     History of hypertension    Personal history of other diseases of the musculoskeletal system and connective tissue     History of degenerative disc disease    Radiculopathy, lumbosacral region     Lumbosacral neuritis    Seasonal allergies     Sprain of ligaments of thoracic spine, initial encounter     Thoracic sprain and strain    Type 2 diabetes mellitus (Multi)     Urinary tract infection     Wears glasses         Last infusion/injection received: N/A (if continuation)    Due: Anytime    Okay to schedule for treatment as ordered by prescribing provider.

## 2024-08-10 LAB — SCAN RESULT: NORMAL

## 2024-08-14 ENCOUNTER — APPOINTMENT (OUTPATIENT)
Dept: GASTROENTEROLOGY | Facility: CLINIC | Age: 64
End: 2024-08-14
Payer: COMMERCIAL

## 2024-08-14 VITALS
DIASTOLIC BLOOD PRESSURE: 80 MMHG | RESPIRATION RATE: 18 BRPM | HEART RATE: 60 BPM | SYSTOLIC BLOOD PRESSURE: 124 MMHG | OXYGEN SATURATION: 96 %

## 2024-08-14 DIAGNOSIS — K52.9 COLITIS: ICD-10-CM

## 2024-08-14 DIAGNOSIS — R10.84 GENERALIZED ABDOMINAL PAIN: ICD-10-CM

## 2024-08-14 PROCEDURE — 3079F DIAST BP 80-89 MM HG: CPT | Performed by: STUDENT IN AN ORGANIZED HEALTH CARE EDUCATION/TRAINING PROGRAM

## 2024-08-14 PROCEDURE — 3074F SYST BP LT 130 MM HG: CPT | Performed by: STUDENT IN AN ORGANIZED HEALTH CARE EDUCATION/TRAINING PROGRAM

## 2024-08-14 PROCEDURE — 99215 OFFICE O/P EST HI 40 MIN: CPT | Performed by: STUDENT IN AN ORGANIZED HEALTH CARE EDUCATION/TRAINING PROGRAM

## 2024-08-14 PROCEDURE — 1036F TOBACCO NON-USER: CPT | Performed by: STUDENT IN AN ORGANIZED HEALTH CARE EDUCATION/TRAINING PROGRAM

## 2024-08-14 PROCEDURE — 4010F ACE/ARB THERAPY RXD/TAKEN: CPT | Performed by: STUDENT IN AN ORGANIZED HEALTH CARE EDUCATION/TRAINING PROGRAM

## 2024-08-14 RX ORDER — PROCHLORPERAZINE MALEATE 10 MG
10 TABLET ORAL EVERY 6 HOURS PRN
Qty: 30 TABLET | Refills: 0 | Status: SHIPPED | OUTPATIENT
Start: 2024-08-14

## 2024-08-14 RX ORDER — PREDNISONE 20 MG/1
TABLET ORAL
Qty: 21 TABLET | Refills: 0 | Status: SHIPPED | OUTPATIENT
Start: 2024-08-14 | End: 2024-09-03

## 2024-08-14 NOTE — PROGRESS NOTES
Subjective     History of Present Illness:   Savi Govea is a 63 y.o. female with hx of T2DM (A1c of ~ 7), obesity who presents to clinic for evaluation of chronic symptoms of nausea, emesis, abdominal pain and diarrhea with new findings of likely colonic Crohn's disease on colonoscopy.     She was started on a course of prednisone and has now tapered down to 10 mg a day.  She states she does not feel dramatically improved compared with previously.  She notes that she had recent stressors that required antiemetic use due to daily nausea.  She attributes this to stress from moving.  She notes over the last 3 days things have calm down.  She was switched from Bentyl to Levsin as Bentyl is causing leg cramps.  The Levsin does improve her symptoms of pain however feels like this worsens her nausea.    She is planning to start Skyrizi infusions.    She had slight amount of blood in her stool this morning and attributes that to a hemorrhoid.  Has not noticed any change in her diarrhea with the prednisone however states this is because she has had diarrhea for years.      Past Medical History   has a past medical history of Abnormal weight gain, Arrhythmia, Arthritis, COVID-19, Diabetes mellitus (Multi), Hyperlipidemia, Hypertension, Irregular heart beat, Irritable bowel syndrome, Joint pain, Nephrolithiasis, Other abnormal glucose, Other conditions influencing health status, Personal history of other diseases of the circulatory system, Personal history of other diseases of the musculoskeletal system and connective tissue, Radiculopathy, lumbosacral region, Seasonal allergies, Sprain of ligaments of thoracic spine, initial encounter, Type 2 diabetes mellitus (Multi), Urinary tract infection, and Wears glasses.     Social History   reports that she quit smoking about 19 years ago. Her smoking use included cigarettes. She started smoking about 29 years ago. She has a 5 pack-year smoking history. She has never used  smokeless tobacco. She reports that she does not drink alcohol and does not use drugs.     Family History  family history includes Diabetes in her brother, maternal grandmother, and paternal grandfather; Graves' disease in her brother; Hypertension in her father, maternal grandfather, and paternal grandfather; Multiple sclerosis in her brother; Skin cancer in her maternal grandmother; Thyroid cancer in her sister; Uterine cancer in her mother.     Allergies  Allergies   Allergen Reactions    Aspirin GI Upset     Jittery feeling    Caffeine Unknown    Cefditoren Unknown    Cetirizine Other     Leg cramps    Fexofenadine Other     Leg cramps    Phenylephrine-Guaifenesin Other     Patient states that she gets very hyper and cannot sleep.    Pseudoephedrine-Guaifenesin Unknown    Salicylates Other    Spectracef [Cefditoren Pivoxil] Unknown       Medications  Current Outpatient Medications   Medication Instructions    alpha lipoic acid 300 mg capsule 1 capsule, oral, 2 times daily    ammonium lactate (Lac-Hydrin) 12 % lotion Topical, Daily PRN, As directed     ascorbic acid (VITAMIN C) 1,000 mg, oral, Daily    calcium citrate-vitamin D3 200 mg-3.125 mcg (125 unit) tablet 1 tablet, oral, Daily    CHROMIUM PICOLINATE ORAL 1 tablet, oral, Daily    cinnamon (CINNAMON) 500 mg, oral, 2 times daily    dicyclomine (BENTYL) 20 mg, oral, 4 times daily PRN    glimepiride (AMARYL) 4 mg, oral, 2 times daily    hyoscyamine (ANASPAZ, LEVSIN) 0.125 mg, oral, Every 6 hours PRN    irbesartan (AVAPRO) 300 mg, oral, Daily    Januvia 100 mg tablet Take 1 tablet (100 mg) by mouth once daily.    Jardiance 25 mg, oral, Daily    magnesium oxide 400 mg magnesium capsule 1 capsule, oral, Daily    metFORMIN (GLUCOPHAGE) 1,000 mg, oral, 2 times daily (morning and late afternoon)    metoprolol succinate XL (KAPSPARGO SPRINKLE) 100 mg, oral, Daily    multivitamin tablet 1 tablet, oral, Daily RT    OneTouch Delica Plus Lancet 30 gauge misc     OneTouch  Verio test strips strip     pomegranate fruit extract 250 mg capsule 1 capsule, oral, 2 times daily    potassium citrate 99 mg, oral, Daily RT    predniSONE (Deltasone) 20 mg tablet Take 1.5 tablets (30 mg) by mouth once daily for 7 days, THEN 1 tablet (20 mg) once daily for 7 days, THEN 0.5 tablets (10 mg) once daily for 7 days. Take by mouth as directed.    prochlorperazine (COMPAZINE) 10 mg, oral, Every 6 hours PRN    risankizumab-rzaa (SKYRIZI) 180 mg, subcutaneous, Every 8 weeks    saccharomyces boulardii (FLORASTOR) 250 mg, oral, 2 times daily    sodium,potassium,mag sulfates (Suprep) 17.5-3.13-1.6 gram recon soln solution Take one bottle twice as directed by the prep instructions    Soma 350 mg, oral, Daily    zinc gluconate 50 mg tablet 1 tablet, oral, Daily        Objective   Visit Vitals  /80 (BP Location: Left arm, Patient Position: Sitting, BP Cuff Size: Large adult)   Pulse 60   Resp 18      Physical Exam    Assessment/Plan   Savi Govea is a 63 y.o. female with hx of T2DM (A1c of ~ 7), obesity who presents to clinic for evaluation of chronic symptoms of nausea, emesis, abdominal pain and diarrhea with new findings of likely colonic Crohn's disease on colonoscopy.     Interestingly patient's predominant complaint is daily nausea.  She has no findings worrisome for obstructive process on enterography or bidirectional endoscopy.  It is unclear if this is a sequelae of her colonic inflammation versus a secondary process such as gastroparesis or adrenal insufficiency?.    She is planned to start Skyrizi infusions which was just approved.  Will obtain gastric emptying study.  If her symptoms of nausea persist despite treatment of inflammatory bowel disease will need to expand workup for other causes of nausea including adrenal insufficiency and central causes.    Will refill prednisone for 3-week taper while she awaits Skyrizi appointment.    She is agreeable to plan all questions  answered.    Problem List Items Addressed This Visit    None  Visit Diagnoses       Generalized abdominal pain        Relevant Medications    prochlorperazine (Compazine) 10 mg tablet    predniSONE (Deltasone) 20 mg tablet    Other Relevant Orders    NM gastric emptying solid    Colitis        Relevant Medications    prochlorperazine (Compazine) 10 mg tablet    predniSONE (Deltasone) 20 mg tablet                   Melly Winn MD         My final recommendations will be communicated back to the requesting physician by way of shared Medical record or letter to requesting physician via fax.

## 2024-08-15 ENCOUNTER — TELEPHONE (OUTPATIENT)
Dept: GASTROENTEROLOGY | Facility: CLINIC | Age: 64
End: 2024-08-15
Payer: COMMERCIAL

## 2024-08-15 NOTE — TELEPHONE ENCOUNTER
Spoke with patient over the phone. She has an active auth on file for Skyrizi IV. She is scheduled for her first infusion. She may have received a denial from [employer] for her maintenance doses .  specialty is working this auth. I will keep pt informed of any updates.

## 2024-08-26 ENCOUNTER — APPOINTMENT (OUTPATIENT)
Dept: INFUSION THERAPY | Facility: CLINIC | Age: 64
End: 2024-08-26
Payer: COMMERCIAL

## 2024-08-26 VITALS
RESPIRATION RATE: 18 BRPM | TEMPERATURE: 97.8 F | BODY MASS INDEX: 38.96 KG/M2 | SYSTOLIC BLOOD PRESSURE: 133 MMHG | WEIGHT: 234.13 LBS | DIASTOLIC BLOOD PRESSURE: 66 MMHG | HEART RATE: 71 BPM | OXYGEN SATURATION: 97 %

## 2024-08-26 DIAGNOSIS — K50.119 CROHN'S DISEASE OF LARGE INTESTINE WITH COMPLICATION (MULTI): Primary | ICD-10-CM

## 2024-08-26 DIAGNOSIS — K52.9 COLITIS: ICD-10-CM

## 2024-08-26 DIAGNOSIS — K50.119 CROHN'S DISEASE OF COLON WITH COMPLICATION (MULTI): ICD-10-CM

## 2024-08-26 PROCEDURE — 96365 THER/PROPH/DIAG IV INF INIT: CPT | Performed by: REGISTERED NURSE

## 2024-08-26 RX ORDER — ACETAMINOPHEN 325 MG/1
650 TABLET ORAL ONCE
OUTPATIENT
Start: 2024-09-23

## 2024-08-26 RX ORDER — DIPHENHYDRAMINE HYDROCHLORIDE 50 MG/ML
50 INJECTION INTRAMUSCULAR; INTRAVENOUS AS NEEDED
OUTPATIENT
Start: 2024-09-23

## 2024-08-26 RX ORDER — ACETAMINOPHEN 325 MG/1
650 TABLET ORAL ONCE
Status: COMPLETED | OUTPATIENT
Start: 2024-08-26 | End: 2024-08-26

## 2024-08-26 RX ORDER — ALBUTEROL SULFATE 0.83 MG/ML
3 SOLUTION RESPIRATORY (INHALATION) AS NEEDED
OUTPATIENT
Start: 2024-09-23

## 2024-08-26 RX ORDER — FAMOTIDINE 10 MG/ML
20 INJECTION INTRAVENOUS ONCE AS NEEDED
OUTPATIENT
Start: 2024-09-23

## 2024-08-26 RX ORDER — EPINEPHRINE 0.3 MG/.3ML
0.3 INJECTION SUBCUTANEOUS EVERY 5 MIN PRN
OUTPATIENT
Start: 2024-09-23

## 2024-08-26 ASSESSMENT — ENCOUNTER SYMPTOMS
ROS GI COMMENTS: CRAMPING
VOMITING: 1
NEUROLOGICAL NEGATIVE: 1
ABDOMINAL PAIN: 1
DIARRHEA: 1
CARDIOVASCULAR NEGATIVE: 1
ARTHRALGIAS: 1
HEMATOLOGIC/LYMPHATIC NEGATIVE: 1
ENDOCRINE NEGATIVE: 1
CONSTITUTIONAL NEGATIVE: 1
RESPIRATORY NEGATIVE: 1
EYES NEGATIVE: 1

## 2024-08-26 NOTE — PATIENT INSTRUCTIONS
Today :We administered acetaminophen and risankizumab-rzaa (Skyrizi) 600 mg in dextrose 5% 260 mL IV.     For:   1. Crohn's disease of colon with complication (Multi)         Your next appointment is due in:  4 WEEKS        Please read the  Medication Guide that was given to you and reviewed during todays visit.     (Tell all doctors including dentists that you are taking this medication)     Go to the emergency room or call 911 if:  -You have signs of allergic reaction:   -Rash, hives, itching.   -Swollen, blistered, peeling skin.   -Swelling of face, lips, mouth, tongue or throat.   -Tightness of chest, trouble breathing, swallowing or talking     Call your doctor:  - If IV / injection site gets red, warm, swollen, itchy or leaks fluid or pus.     (Leave dressing on your IV site for at least 2 hours and keep area clean and dry  - If you get sick or have symptoms of infection or are not feeling well for any reason.    (Wash your hands often, stay away from people who are sick)  - If you have side effects from your medication that do not go away or are bothersome.     (Refer to the teaching your nurse gave you for side effects to call your doctor about)    - Common side effects may include:  stuffy nose, headache, feeling tired, muscle aches, upset stomach  - Before receiving any vaccines     - Call the [facility] at [phone]  If:  - You get sick, are on antibiotics, have had a recent vaccine, have surgery or dental work and your doctor wants your visit rescheduled.  - You need to cancel and reschedule your visit for any reason. Call at least 2 days before your visit if you need to cancel.   - Your insurance changes before your next visit.    (We will need to get approval from your new insurance. This can take up to two weeks.)     [facility] is opened Monday thru Friday. We are closed on weekends and holidays.   Voice mail will take your call if the center is closed. If you  leave a message please allow 24 hours for a call back during weekdays. If you leave a message on a weekend/holiday, we will call you back the next business day.

## 2024-08-26 NOTE — PROGRESS NOTES
[facility]   Infusion Clinic Note   Date: 2024   Name: Savi Govea  : 1960   MRN: [id number]          Reason for Visit: OP Infusion (600 MG SKYRIZI INFUSION)   FIRST INFUSION      Today: We administered acetaminophen and risankizumab-rzaa (Skyrizi) 600 mg in dextrose 5% 260 mL IV.       Visit Type: INFUSION              Accompanied by:Self       Diagnosis: Crohn's disease of colon with complication (Multi)        Allergies:   Allergies as of 2024 - Reviewed 2024   Allergen Reaction Noted    Aspirin GI Upset 10/08/2023    Caffeine Unknown 2024    Cefditoren Unknown 2012    Cetirizine Other 2012    Fexofenadine Other 2012    Phenylephrine-guaifenesin Other 2012    Pseudoephedrine-guaifenesin Unknown 10/08/2023    Salicylates Other 2012    Spectracef [cefditoren pivoxil] Unknown 10/08/2023          Current Medications has a current medication list which includes the following prescription(s): alpha lipoic acid, ammonium lactate, ascorbic acid, calcium citrate-vitamin d3, chromium picolinate, cinnamon, dicyclomine, glimepiride, hyoscyamine, irbesartan, januvia, jardiance, magnesium oxide, metformin, metoprolol succinate xl, multivitamin, onetouch delica plus lancet, onetouch verio test strips, pomegranate fruit extract, potassium citrate, prednisone, prochlorperazine, risankizumab-rzaa, saccharomyces boulardii, sodium,potassium,mag sulfates, soma, and zinc gluconate, and the following Facility-Administered Medications: risankizumab-rzaa (Skyrizi) 600 mg in dextrose 5% 260 mL IV.       Vitals:   Vitals:    24 1548   BP: 133/66   Pulse: 71   Resp: 18   Temp: 36.6 °C (97.8 °F)   SpO2: 97%   Weight: 106 kg (234 lb 2.1 oz)             Infusion Pre-procedure Checklist:   - Allergies reviewed: yes   - Medications reviewed: yes       - Previous reaction to current treatment: n/a      Assess patient for the concerns below.  Document provider notification as appropriate.  - Active or recent infection with/without current antibiotic use: no  - Recent or planned invasive dental work: no  - Recent or planned surgeries: no  - Recently received or plans to receive vaccinations: no  - Has treatment related toxicities: no  - Is pregnant:  no      Pain: 6   - Is the pain different from normal: no   - Is the pain tolerable: yes   - Is your Doctor aware:  yes       Labs: N/A          Fall Risk Screening: Kidd Fall Risk  History of Falling, Immediate or Within 3 Months: No  Secondary Diagnosis: No  Ambulatory Aid: Walks without aid/bedrest/nurse assist  Intravenous Therapy/Heparin Lock: No  Gait/Transferring: Normal/bedrest/immobile  Mental Status: Oriented to own ability  Kidd Fall Risk Score: 0       Review Of Systems:  Review of Systems   Constitutional: Negative.    HENT:  Negative.     Eyes: Negative.    Respiratory: Negative.     Cardiovascular: Negative.    Gastrointestinal:  Positive for abdominal pain, diarrhea and vomiting.        CRAMPING   Endocrine: Negative.    Musculoskeletal:  Positive for arthralgias.   Skin: Negative.    Neurological: Negative.    Hematological: Negative.    Psychiatric/Behavioral:          NA           ROS completed? Yes      Infusion Readiness:  - Assessment Concerns Related to Infusion: No  - Provider notified: n/a      Document Below Only If Indicated:   New Patient Education:    NEW PATIENT MEDICATION EDUCATION PT PROVIDED WITH WRITTEN (SEWORKS PT EDUCATION SHEET) AND VERBAL EDUCATION REGARDING MEDICATION GIVEN. VERIFIED MEDICATION NAME WITH PATIENT AND DISCUSSED REASON FOR USE. BRIEFLY DISCUSSED HOW MEDICATION WORKS AND EDUCATED ON GOAL OF TREATMENT, FREQUENCY OF TREATMENT, ADVERSE RXN'S AND COMMON SIDE EFFECTS TO MONITOR FOR. INSTRUCTED PT TO ASSURE THAT ALL PROVIDERS INCLUDING DENTISTS ARE AWARE OF MEDICATION RECEIVED. DISCUSSED FLOW OF VISIT AND ORIENTED TO INFUSION CENTER. PT VERBALIZES  "UNDERSTANDING. CALL LIGHT PROVIDED AND PT AWARE TO ALERT STAFF OF ANY CONCERNS DURING TREATMENT.        Treatment Conditions & Drug Specific Questions:    Risankizumab  (SKYRIZI)   (Unless otherwise specified on patient specific therapy plan):     TREATMENT CONDITIONS:   Unless otherwise specified on patient specific therapy plan HOLD and notify provider prior to proceeding with treatment if:   o Positive Hepatitis B Surface Ag  o Positive T-Spot  o Signs or symptoms of hepatotoxicity  o Elevated AST / ALT  o Total Bilirubin GREATER THAN 2.2 TIMES ULN  - Positive Pregnancy    Lab Results   Component Value Date    HEPBSAG Nonreactive 08/02/2024      Lab Results   Component Value Date    TBSIN Negative 08/02/2024      No results found for: \"NONUHFIRE\", \"NONUHSWGH\", \"NONUHFISH\", \"EXTHEPBSAG\"    Chemistry    Lab Results   Component Value Date/Time     07/02/2024 1219    K 3.9 07/02/2024 1219     07/02/2024 1219    CO2 21 07/02/2024 1219    BUN 12 07/02/2024 1219    CREATININE 0.80 07/02/2024 1219    Lab Results   Component Value Date/Time    CALCIUM 9.3 07/02/2024 1219    ALKPHOS 79 07/02/2024 1219    AST 16 07/02/2024 1219    ALT 21 07/02/2024 1219    BILITOT 0.5 07/02/2024 1219          Lab Results   Component Value Date    ALT 21 07/02/2024    AST 16 07/02/2024    ALKPHOS 79 07/02/2024    BILITOT 0.5 07/02/2024        Labs reviewed and patient meets treatment conditions? Yes    REMINDER:  *Negative pregnancy test prior to first infusion in patients of childbearing ability.     Infusion 1: Provide patient with I and love and you patient education sheet and review. Assist pt in connecting with nurse navigator if not already connected (pt to call [phone]).    Infusion 2: Assure patient is established with nurse navigator in the community. Have patient watch [employer] OBI instructional video. ([email or web address]-resources-to-stay-on-track#obi-training-video)    Infusion 3: " Complete on body injection training. Instruct patient on timing of first self injection. Typically due 4 weeks from 3rd infusion and then every 8 weeks thereafter (1st injection due 12 weeks from week 0 infusion). Refer to individual script.          - Completed? Yes. Week 1 training complete    Recommended Vitals/Observation:  Vitals: Obtain vital signs at start and end of infusion.   Observation: Patient may leave immediately after        Weight Based Drug Calculations:    WEIGHT BASED DRUGS: NOT APPLICABLE / FLAT DOSE          Initiated By: Linda Kong RN      All questions and concerns were addressed at time of visit. Patient was not independently evaluated by the Nurse Practitioner on site at [facility].    08/26/24 at 5:42 PM - TUCKER Schaffer-CNP

## 2024-08-28 ENCOUNTER — HOSPITAL ENCOUNTER (OUTPATIENT)
Dept: RADIOLOGY | Facility: HOSPITAL | Age: 64
Discharge: HOME | End: 2024-08-28
Payer: COMMERCIAL

## 2024-08-28 DIAGNOSIS — R10.84 GENERALIZED ABDOMINAL PAIN: ICD-10-CM

## 2024-08-28 PROCEDURE — 3430000001 HC RX 343 DIAGNOSTIC RADIOPHARMACEUTICALS: Performed by: STUDENT IN AN ORGANIZED HEALTH CARE EDUCATION/TRAINING PROGRAM

## 2024-08-28 PROCEDURE — 78264 GASTRIC EMPTYING IMG STUDY: CPT

## 2024-09-11 ENCOUNTER — APPOINTMENT (OUTPATIENT)
Dept: GASTROENTEROLOGY | Facility: CLINIC | Age: 64
End: 2024-09-11
Payer: COMMERCIAL

## 2024-09-11 ENCOUNTER — OFFICE VISIT (OUTPATIENT)
Dept: GASTROENTEROLOGY | Facility: CLINIC | Age: 64
End: 2024-09-11
Payer: COMMERCIAL

## 2024-09-11 VITALS
SYSTOLIC BLOOD PRESSURE: 158 MMHG | HEART RATE: 72 BPM | WEIGHT: 231.8 LBS | OXYGEN SATURATION: 98 % | RESPIRATION RATE: 18 BRPM | BODY MASS INDEX: 38.62 KG/M2 | DIASTOLIC BLOOD PRESSURE: 88 MMHG | HEIGHT: 65 IN

## 2024-09-11 DIAGNOSIS — R11.2 NAUSEA AND VOMITING, UNSPECIFIED VOMITING TYPE: Primary | ICD-10-CM

## 2024-09-11 PROCEDURE — 4010F ACE/ARB THERAPY RXD/TAKEN: CPT | Performed by: STUDENT IN AN ORGANIZED HEALTH CARE EDUCATION/TRAINING PROGRAM

## 2024-09-11 PROCEDURE — 3008F BODY MASS INDEX DOCD: CPT | Performed by: STUDENT IN AN ORGANIZED HEALTH CARE EDUCATION/TRAINING PROGRAM

## 2024-09-11 PROCEDURE — 99215 OFFICE O/P EST HI 40 MIN: CPT | Performed by: STUDENT IN AN ORGANIZED HEALTH CARE EDUCATION/TRAINING PROGRAM

## 2024-09-11 PROCEDURE — 3077F SYST BP >= 140 MM HG: CPT | Performed by: STUDENT IN AN ORGANIZED HEALTH CARE EDUCATION/TRAINING PROGRAM

## 2024-09-11 PROCEDURE — 3079F DIAST BP 80-89 MM HG: CPT | Performed by: STUDENT IN AN ORGANIZED HEALTH CARE EDUCATION/TRAINING PROGRAM

## 2024-09-11 PROCEDURE — 1036F TOBACCO NON-USER: CPT | Performed by: STUDENT IN AN ORGANIZED HEALTH CARE EDUCATION/TRAINING PROGRAM

## 2024-09-11 RX ORDER — ONDANSETRON 8 MG/1
8 TABLET, ORALLY DISINTEGRATING ORAL EVERY 8 HOURS PRN
Qty: 27 TABLET | Refills: 1 | Status: SHIPPED | OUTPATIENT
Start: 2024-09-11

## 2024-09-11 NOTE — PROGRESS NOTES
Subjective     History of Present Illness:   Savi Govea is a 63 y.o. female with hx of T2DM (A1c of ~ 7), obesity who presents to clinic for evaluation of chronic symptoms of nausea, emesis, abdominal pain and diarrhea with new findings of colonic Crohn's disease on colonoscopy.     Since our last visit she was started on first infusion of Skyrizi 08/26/she is here for the first time in several years she had resolution of her nausea after a week of the infusion.  Her abdominal pain also resolved.  Things were going well up until this morning when her symptoms recurred specifically mostly nausea with nonbloody nonbilious emesis.  She has also noticed that her blood pressures have been lower at home as well as 110s over 50s before taking her antihypertensives and she is planning to follow-up with her cardiologist regarding this.    She has previously tried Copmazine for nausea that did not help  Bentyl caused leg cramps, but zofran might have as well.    She is planning on dental surgery October 10 for dental implant.  Past Medical History   has a past medical history of Abnormal weight gain, Arrhythmia, Arthritis, COVID-19, Diabetes mellitus (Multi), Hyperlipidemia, Hypertension, Irregular heart beat, Irritable bowel syndrome, Joint pain, Nephrolithiasis, Other abnormal glucose, Other conditions influencing health status, Personal history of other diseases of the circulatory system, Personal history of other diseases of the musculoskeletal system and connective tissue, Radiculopathy, lumbosacral region, Seasonal allergies, Sprain of ligaments of thoracic spine, initial encounter, Type 2 diabetes mellitus (Multi), Urinary tract infection, and Wears glasses.     Social History   reports that she quit smoking about 19 years ago. Her smoking use included cigarettes. She started smoking about 29 years ago. She has a 5 pack-year smoking history. She has never used smokeless tobacco. She reports that she does not  drink alcohol and does not use drugs.     Family History  family history includes Diabetes in her brother, maternal grandmother, and paternal grandfather; Graves' disease in her brother; Hypertension in her father, maternal grandfather, and paternal grandfather; Multiple sclerosis in her brother; Skin cancer in her maternal grandmother; Thyroid cancer in her sister; Uterine cancer in her mother.     Allergies  Allergies   Allergen Reactions    Aspirin GI Upset     Jittery feeling    Caffeine Unknown    Cefditoren Unknown    Cetirizine Other     Leg cramps    Fexofenadine Other     Leg cramps    Phenylephrine-Guaifenesin Other     Patient states that she gets very hyper and cannot sleep.    Pseudoephedrine-Guaifenesin Unknown    Salicylates Other    Spectracef [Cefditoren Pivoxil] Unknown       Medications  Current Outpatient Medications   Medication Instructions    alpha lipoic acid 300 mg capsule 1 capsule, oral, 2 times daily    ammonium lactate (Lac-Hydrin) 12 % lotion Topical, Daily PRN, As directed     ascorbic acid (VITAMIN C) 1,000 mg, oral, Daily    calcium citrate-vitamin D3 200 mg-3.125 mcg (125 unit) tablet 1 tablet, oral, Daily    CHROMIUM PICOLINATE ORAL 1 tablet, oral, Daily    cinnamon (CINNAMON) 500 mg, oral, 2 times daily    glimepiride (AMARYL) 4 mg, oral, 2 times daily    irbesartan (AVAPRO) 300 mg, oral, Daily    Januvia 100 mg tablet Take 1 tablet (100 mg) by mouth once daily.    Jardiance 25 mg, oral, Daily    magnesium oxide 400 mg magnesium capsule 1 capsule, oral, Daily    metFORMIN (GLUCOPHAGE) 1,000 mg, oral, 2 times daily (morning and late afternoon)    metoprolol succinate XL (KAPSPARGO SPRINKLE) 100 mg, oral, Daily    multivitamin tablet 1 tablet, oral, Daily RT    ondansetron ODT (ZOFRAN-ODT) 8 mg, oral, Every 8 hours PRN    OneTouch Delica Plus Lancet 30 gauge misc     OneTouch Verio test strips strip     pomegranate fruit extract 250 mg capsule 1 capsule, oral, 2 times daily     potassium citrate 99 mg, oral, Daily RT    risankizumab-rzaa (SKYRIZI) 180 mg, subcutaneous, Every 8 weeks    saccharomyces boulardii (FLORASTOR) 250 mg, oral, 2 times daily    Soma 350 mg, oral, Daily    zinc gluconate 50 mg tablet 1 tablet, oral, Daily        Objective   Visit Vitals  /88 (BP Location: Left arm, Patient Position: Sitting, BP Cuff Size: Large adult)   Pulse 72   Resp 18      Physical Exam  Constitutional:       General: She is not in acute distress.     Appearance: Normal appearance.   HENT:      Head: Normocephalic and atraumatic.      Mouth/Throat:      Mouth: Mucous membranes are moist.   Eyes:      Extraocular Movements: Extraocular movements intact.   Pulmonary:      Effort: Pulmonary effort is normal.      Breath sounds: No stridor. No wheezing.   Abdominal:      General: Abdomen is flat. There is no distension.   Musculoskeletal:         General: Normal range of motion.   Skin:     General: Skin is warm and dry.   Neurological:      General: No focal deficit present.      Mental Status: She is alert.   Psychiatric:         Mood and Affect: Mood normal.         Judgment: Judgment normal.         Assessment/Plan   Savi Govea is a 63 y.o. female with hx of T2DM (A1c of ~ 7), obesity who presents to clinic for evaluation of chronic symptoms of nausea, emesis, abdominal pain and diarrhea with findings of newly diagnosed colonic Crohn's disease. Had improvement in symptoms after first infusion of Skyrizi though nausea recurred this week.    Interestingly patient's predominant complaint is daily nausea.  She has no findings worrisome for obstructive process on enterography or bidirectional endoscopy.  It is unclear if this is a sequelae of her colonic inflammation versus a secondary process. GES reviewed and shows dumping phenotype, however this might be due to enhanced gastrocolic reflex in setting of untreated IBD.  She notes her nausea is worsened with standing and physical activity  so unclear if ?hypotension is contributing for which she is planned to follow up with cardiology. Will plan on MRI brain to expand workup of nausea. In the interim will continue with Skyrizi therapy. May require further evaluation with AM cortisol depending on clinical course and results of above evaluation.    Note made that patient is planned for dental implant on 10/10. Discussed with patient data regarding pre-operative management for Skyrizi is overall scarce and there is no strong data to suggest holding therapy before surgery decreases risks of infection. Regardless we will plan that dental procedure is staggered so it is at least 14 days post infusion. Will also delay last infusion dose to ~ 3-4 weeks to optimize wound healing as a precautionary measure.    She is agreeable to plan all questions answered.        Problem List Items Addressed This Visit    None  Visit Diagnoses       Nausea and vomiting, unspecified vomiting type    -  Primary    Relevant Medications    ondansetron ODT (Zofran-ODT) 8 mg disintegrating tablet    Other Relevant Orders    MR brain w and wo IV contrast                   Melly Winn MD         My final recommendations will be communicated back to the requesting physician by way of shared Medical record or letter to requesting physician via fax.

## 2024-09-23 ENCOUNTER — APPOINTMENT (OUTPATIENT)
Dept: INFUSION THERAPY | Facility: CLINIC | Age: 64
End: 2024-09-23
Payer: COMMERCIAL

## 2024-09-23 VITALS
DIASTOLIC BLOOD PRESSURE: 68 MMHG | RESPIRATION RATE: 20 BRPM | HEART RATE: 66 BPM | OXYGEN SATURATION: 97 % | SYSTOLIC BLOOD PRESSURE: 123 MMHG

## 2024-09-23 DIAGNOSIS — K50.119 CROHN'S DISEASE OF COLON WITH COMPLICATION (MULTI): ICD-10-CM

## 2024-09-23 PROCEDURE — 96365 THER/PROPH/DIAG IV INF INIT: CPT | Performed by: REGISTERED NURSE

## 2024-09-23 RX ORDER — ACETAMINOPHEN 325 MG/1
650 TABLET ORAL ONCE
OUTPATIENT
Start: 2024-10-21

## 2024-09-23 RX ORDER — FAMOTIDINE 10 MG/ML
20 INJECTION INTRAVENOUS ONCE AS NEEDED
OUTPATIENT
Start: 2024-10-21

## 2024-09-23 RX ORDER — DIPHENHYDRAMINE HYDROCHLORIDE 50 MG/ML
50 INJECTION INTRAMUSCULAR; INTRAVENOUS AS NEEDED
OUTPATIENT
Start: 2024-10-21

## 2024-09-23 RX ORDER — ACETAMINOPHEN 325 MG/1
650 TABLET ORAL ONCE
Status: COMPLETED | OUTPATIENT
Start: 2024-09-23 | End: 2024-09-23

## 2024-09-23 RX ORDER — EPINEPHRINE 0.3 MG/.3ML
0.3 INJECTION SUBCUTANEOUS EVERY 5 MIN PRN
OUTPATIENT
Start: 2024-10-21

## 2024-09-23 RX ORDER — ALBUTEROL SULFATE 0.83 MG/ML
3 SOLUTION RESPIRATORY (INHALATION) AS NEEDED
OUTPATIENT
Start: 2024-10-21

## 2024-09-23 ASSESSMENT — ENCOUNTER SYMPTOMS
DIARRHEA: 0
ABDOMINAL DISTENTION: 0
ABDOMINAL PAIN: 0
BACK PAIN: 1

## 2024-09-23 NOTE — PROGRESS NOTES
[facility]   Infusion Clinic Note   Date: 2024   Name: Savi Govea  : 1960   MRN: [id number]          Reason for Visit: OP Infusion (Skyrizi 600 mg)         Today: We administered acetaminophen and risankizumab-rzaa (Skyrizi) 600 mg in dextrose 5% 260 mL IV.       Visit Type: INFUSION       Ordered By: GAVIN ARCHULETA MD       Accompanied by:Relative       Diagnosis: Crohn's disease of colon with complication (Multi)        Allergies:   Allergies as of 2024 - Reviewed 2024   Allergen Reaction Noted    Aspirin GI Upset 10/08/2023    Caffeine Unknown 2024    Cefditoren Unknown 2012    Cetirizine Other 2012    Fexofenadine Other 2012    Phenylephrine-guaifenesin Other 2012    Pseudoephedrine-guaifenesin Unknown 10/08/2023    Salicylates Other 2012    Spectracef [cefditoren pivoxil] Unknown 10/08/2023          Current Medications has a current medication list which includes the following prescription(s): alpha lipoic acid, ammonium lactate, ascorbic acid, calcium citrate-vitamin d3, chromium picolinate, cinnamon, glimepiride, irbesartan, januvia, jardiance, magnesium oxide, metformin, metoprolol succinate xl, multivitamin, ondansetron odt, onetouch delica plus lancet, onetouch verio test strips, pomegranate fruit extract, potassium citrate, risankizumab-rzaa, saccharomyces boulardii, soma, and zinc gluconate.       Vitals:   Vitals:    24 1705 24 1725   BP: 150/90 123/68   Pulse: 69 66   Resp: 20    SpO2: 97%              Infusion Pre-procedure Checklist:   - Allergies reviewed: yes   - Medications reviewed: yes       - Previous reaction to current treatment: no      Assess patient for the concerns below. Document provider notification as appropriate.  - Active or recent infection with/without current antibiotic use: no  - Recent or planned invasive dental work: yes Oct 10 th  - Recent or planned surgeries: no  -  "Recently received or plans to receive vaccinations: no  - Has treatment related toxicities: no  - Is pregnant:  no      Pain: 0   - Is the pain different from normal: n/a   - Is your Doctor aware:  n/a       Labs: N/A          Fall Risk Screening: Bernabe Fall Risk  History of Falling, Immediate or Within 3 Months: No  Ambulatory Aid: Walks without aid/bedrest/nurse assist  Intravenous Therapy/Heparin Lock: Yes  Gait/Transferring: Normal/bedrest/immobile  Mental Status: Oriented to own ability       Review Of Systems:  Review of Systems   Gastrointestinal:  Negative for abdominal distention, abdominal pain and diarrhea.        Symptoms much less after 1st infusion   Musculoskeletal:  Positive for back pain.         ROS completed? Yes      Infusion Readiness:  - Assessment Concerns Related to Infusion: Schedule may be off to to dental work; will check with pharmacy and notify pt of next appointment.  - Provider notified: yes      Document Below Only If Indicated:   New Patient Education:    N/A (returning patient for continuation of therapy. Ongoing education provided as needed.)        Treatment Conditions & Drug Specific Questions:    Risankizumab  (SKYRIZI)   (Unless otherwise specified on patient specific therapy plan):     TREATMENT CONDITIONS:   Unless otherwise specified on patient specific therapy plan HOLD and notify provider prior to proceeding with treatment if:   o Positive Hepatitis B Surface Ag  o Positive T-Spot  o Signs or symptoms of hepatotoxicity  o Elevated AST / ALT  o Total Bilirubin GREATER THAN 2.2 TIMES ULN  - Positive Pregnancy    Lab Results   Component Value Date    HEPBSAG Nonreactive 08/02/2024      Lab Results   Component Value Date    TBSIN Negative 08/02/2024      No results found for: \"NONUHFIRE\", \"NONUHSWGH\", \"NONUHFISH\", \"EXTHEPBSAG\"    Chemistry    Lab Results   Component Value Date/Time     07/02/2024 1219    K 3.9 07/02/2024 1219     07/02/2024 1219    CO2 21 " 07/02/2024 1219    BUN 12 07/02/2024 1219    CREATININE 0.80 07/02/2024 1219    Lab Results   Component Value Date/Time    CALCIUM 9.3 07/02/2024 1219    ALKPHOS 79 07/02/2024 1219    AST 16 07/02/2024 1219    ALT 21 07/02/2024 1219    BILITOT 0.5 07/02/2024 1219          Lab Results   Component Value Date    ALT 21 07/02/2024    AST 16 07/02/2024    ALKPHOS 79 07/02/2024    BILITOT 0.5 07/02/2024        Labs reviewed and patient meets treatment conditions? Yes    REMINDER:  *Negative pregnancy test prior to first infusion in patients of childbearing ability.     Infusion 1: Provide patient with [employer]by patient education sheet and review. Assist pt in connecting with nurse navigator if not already connected (pt to call [phone]).    Infusion 2: Assure patient is established with nurse navigator in the community. Have patient watch [employer]I instructional video. ([email or web address]-resources-to-stay-on-track#obi-training-video)    Infusion 3: Complete on body injection training. Instruct patient on timing of first self injection. Typically due 4 weeks from 3rd infusion and then every 8 weeks thereafter (1st injection due 12 weeks from week 0 infusion). Refer to individual script.          - Completed? Yes. Week 2 training complete    Recommended Vitals/Observation:  Vitals: Obtain vital signs at start and end of infusion.   Observation: Patient may leave immediately after        Weight Based Drug Calculations:    WEIGHT BASED DRUGS: NOT APPLICABLE / FLAT DOSE          Initiated By: Christina Vaughan RN      All questions and concerns were addressed at time of visit. Patient was not independently evaluated by the Nurse Practitioner on site at [facility].    09/23/24 at 6:31 PM - TUCKER Schaffer-CNP

## 2024-09-23 NOTE — PATIENT INSTRUCTIONS
Today :We administered acetaminophen and risankizumab-rzaa (Skyrizi) 600 mg in dextrose 5% 260 mL IV.     For:   1. Crohn's disease of colon with complication (Multi)         Your next appointment is due in:  pt will call to schedule next appointment in approx 5 weeks        Please read the  Medication Guide that was given to you and reviewed during todays visit.     (Tell all doctors including dentists that you are taking this medication)     Go to the emergency room or call 911 if:  -You have signs of allergic reaction:   -Rash, hives, itching.   -Swollen, blistered, peeling skin.   -Swelling of face, lips, mouth, tongue or throat.   -Tightness of chest, trouble breathing, swallowing or talking     Call your doctor:  - If IV / injection site gets red, warm, swollen, itchy or leaks fluid or pus.     (Leave dressing on your IV site for at least 2 hours and keep area clean and dry  - If you get sick or have symptoms of infection or are not feeling well for any reason.    (Wash your hands often, stay away from people who are sick)  - If you have side effects from your medication that do not go away or are bothersome.     (Refer to the teaching your nurse gave you for side effects to call your doctor about)    - Common side effects may include:  stuffy nose, headache, feeling tired, muscle aches, upset stomach  - Before receiving any vaccines     - Call the [facility] at   If:  - You get sick, are on antibiotics, have had a recent vaccine, have surgery or dental work and your doctor wants your visit rescheduled.  - You need to cancel and reschedule your visit for any reason. Call at least 2 days before your visit if you need to cancel.   - Your insurance changes before your next visit.    (We will need to get approval from your new insurance. This can take up to two weeks.)     [facility] is opened Monday thru Friday. We are closed on weekends and holidays.   Voice mail will take  your call if the center is closed. If you leave a message please allow 24 hours for a call back during weekdays. If you leave a message on a weekend/holiday, we will call you back the next business day.

## 2024-10-01 ENCOUNTER — PATIENT MESSAGE (OUTPATIENT)
Dept: GASTROENTEROLOGY | Facility: CLINIC | Age: 64
End: 2024-10-01
Payer: COMMERCIAL

## 2024-10-04 ENCOUNTER — HOSPITAL ENCOUNTER (OUTPATIENT)
Dept: RADIOLOGY | Facility: HOSPITAL | Age: 64
Discharge: HOME | End: 2024-10-04
Payer: COMMERCIAL

## 2024-10-04 DIAGNOSIS — R11.2 NAUSEA AND VOMITING, UNSPECIFIED VOMITING TYPE: ICD-10-CM

## 2024-10-04 PROCEDURE — A9575 INJ GADOTERATE MEGLUMI 0.1ML: HCPCS | Performed by: STUDENT IN AN ORGANIZED HEALTH CARE EDUCATION/TRAINING PROGRAM

## 2024-10-04 PROCEDURE — 70553 MRI BRAIN STEM W/O & W/DYE: CPT

## 2024-10-04 PROCEDURE — 2550000001 HC RX 255 CONTRASTS: Performed by: STUDENT IN AN ORGANIZED HEALTH CARE EDUCATION/TRAINING PROGRAM

## 2024-10-04 RX ORDER — GADOTERATE MEGLUMINE 376.9 MG/ML
0.2 INJECTION INTRAVENOUS
Status: COMPLETED | OUTPATIENT
Start: 2024-10-04 | End: 2024-10-04

## 2024-10-07 ENCOUNTER — HOSPITAL ENCOUNTER (OUTPATIENT)
Facility: HOSPITAL | Age: 64
Setting detail: OBSERVATION
Discharge: HOME | End: 2024-10-08
Attending: EMERGENCY MEDICINE | Admitting: STUDENT IN AN ORGANIZED HEALTH CARE EDUCATION/TRAINING PROGRAM
Payer: COMMERCIAL

## 2024-10-07 ENCOUNTER — APPOINTMENT (OUTPATIENT)
Dept: RADIOLOGY | Facility: HOSPITAL | Age: 64
End: 2024-10-07
Payer: COMMERCIAL

## 2024-10-07 DIAGNOSIS — G08 CEREBRAL VENOUS THROMBOSIS OF SIGMOID SINUS (HHS-HCC): Primary | ICD-10-CM

## 2024-10-07 LAB
ANION GAP SERPL CALC-SCNC: 14 MMOL/L (ref 10–20)
APTT PPP: 36 SECONDS (ref 27–38)
BASOPHILS # BLD AUTO: 0.07 X10*3/UL (ref 0–0.1)
BASOPHILS NFR BLD AUTO: 0.8 %
BUN SERPL-MCNC: 7 MG/DL (ref 6–23)
CALCIUM SERPL-MCNC: 9.8 MG/DL (ref 8.6–10.3)
CHLORIDE SERPL-SCNC: 104 MMOL/L (ref 98–107)
CO2 SERPL-SCNC: 27 MMOL/L (ref 21–32)
CREAT SERPL-MCNC: 0.75 MG/DL (ref 0.5–1.05)
EGFRCR SERPLBLD CKD-EPI 2021: 90 ML/MIN/1.73M*2
EOSINOPHIL # BLD AUTO: 0.14 X10*3/UL (ref 0–0.7)
EOSINOPHIL NFR BLD AUTO: 1.7 %
ERYTHROCYTE [DISTWIDTH] IN BLOOD BY AUTOMATED COUNT: 14.6 % (ref 11.5–14.5)
GLUCOSE SERPL-MCNC: 143 MG/DL (ref 74–99)
HCT VFR BLD AUTO: 46.2 % (ref 36–46)
HGB BLD-MCNC: 14.8 G/DL (ref 12–16)
HOLD SPECIMEN: NORMAL
HOLD SPECIMEN: NORMAL
IMM GRANULOCYTES # BLD AUTO: 0.03 X10*3/UL (ref 0–0.7)
IMM GRANULOCYTES NFR BLD AUTO: 0.4 % (ref 0–0.9)
INR PPP: 1.1 (ref 0.9–1.1)
LYMPHOCYTES # BLD AUTO: 1.62 X10*3/UL (ref 1.2–4.8)
LYMPHOCYTES NFR BLD AUTO: 19.6 %
MCH RBC QN AUTO: 27.4 PG (ref 26–34)
MCHC RBC AUTO-ENTMCNC: 32 G/DL (ref 32–36)
MCV RBC AUTO: 86 FL (ref 80–100)
MONOCYTES # BLD AUTO: 0.6 X10*3/UL (ref 0.1–1)
MONOCYTES NFR BLD AUTO: 7.3 %
NEUTROPHILS # BLD AUTO: 5.81 X10*3/UL (ref 1.2–7.7)
NEUTROPHILS NFR BLD AUTO: 70.2 %
NRBC BLD-RTO: 0 /100 WBCS (ref 0–0)
PLATELET # BLD AUTO: 341 X10*3/UL (ref 150–450)
POTASSIUM SERPL-SCNC: 3.9 MMOL/L (ref 3.5–5.3)
PROTHROMBIN TIME: 12.5 SECONDS (ref 9.8–12.8)
RBC # BLD AUTO: 5.4 X10*6/UL (ref 4–5.2)
SODIUM SERPL-SCNC: 141 MMOL/L (ref 136–145)
WBC # BLD AUTO: 8.3 X10*3/UL (ref 4.4–11.3)

## 2024-10-07 PROCEDURE — 99285 EMERGENCY DEPT VISIT HI MDM: CPT

## 2024-10-07 PROCEDURE — 85306 CLOT INHIBIT PROT S FREE: CPT | Mod: ELYLAB | Performed by: EMERGENCY MEDICINE

## 2024-10-07 PROCEDURE — 70546 MR ANGIOGRAPH HEAD W/O&W/DYE: CPT

## 2024-10-07 PROCEDURE — 70546 MR ANGIOGRAPH HEAD W/O&W/DYE: CPT | Performed by: RADIOLOGY

## 2024-10-07 PROCEDURE — 85301 ANTITHROMBIN III ANTIGEN: CPT | Performed by: EMERGENCY MEDICINE

## 2024-10-07 PROCEDURE — A9575 INJ GADOTERATE MEGLUMI 0.1ML: HCPCS | Performed by: EMERGENCY MEDICINE

## 2024-10-07 PROCEDURE — 80048 BASIC METABOLIC PNL TOTAL CA: CPT | Performed by: EMERGENCY MEDICINE

## 2024-10-07 PROCEDURE — 96361 HYDRATE IV INFUSION ADD-ON: CPT

## 2024-10-07 PROCEDURE — 85730 THROMBOPLASTIN TIME PARTIAL: CPT | Performed by: EMERGENCY MEDICINE

## 2024-10-07 PROCEDURE — 85303 CLOT INHIBIT PROT C ACTIVITY: CPT | Mod: ELYLAB | Performed by: EMERGENCY MEDICINE

## 2024-10-07 PROCEDURE — 36415 COLL VENOUS BLD VENIPUNCTURE: CPT | Performed by: EMERGENCY MEDICINE

## 2024-10-07 PROCEDURE — 2550000001 HC RX 255 CONTRASTS: Performed by: EMERGENCY MEDICINE

## 2024-10-07 PROCEDURE — 2500000004 HC RX 250 GENERAL PHARMACY W/ HCPCS (ALT 636 FOR OP/ED): Performed by: EMERGENCY MEDICINE

## 2024-10-07 PROCEDURE — 85610 PROTHROMBIN TIME: CPT | Performed by: EMERGENCY MEDICINE

## 2024-10-07 PROCEDURE — 99223 1ST HOSP IP/OBS HIGH 75: CPT | Performed by: STUDENT IN AN ORGANIZED HEALTH CARE EDUCATION/TRAINING PROGRAM

## 2024-10-07 PROCEDURE — 96374 THER/PROPH/DIAG INJ IV PUSH: CPT | Mod: 59

## 2024-10-07 PROCEDURE — 85025 COMPLETE CBC W/AUTO DIFF WBC: CPT | Performed by: EMERGENCY MEDICINE

## 2024-10-07 PROCEDURE — 70544 MR ANGIOGRAPHY HEAD W/O DYE: CPT | Mod: 59

## 2024-10-07 PROCEDURE — G0378 HOSPITAL OBSERVATION PER HR: HCPCS

## 2024-10-07 RX ORDER — DIPHENHYDRAMINE HYDROCHLORIDE 50 MG/ML
50 INJECTION INTRAMUSCULAR; INTRAVENOUS ONCE
Status: COMPLETED | OUTPATIENT
Start: 2024-10-07 | End: 2024-10-07

## 2024-10-07 RX ORDER — ACETAMINOPHEN 650 MG/1
650 SUPPOSITORY RECTAL EVERY 4 HOURS PRN
Status: DISCONTINUED | OUTPATIENT
Start: 2024-10-07 | End: 2024-10-08 | Stop reason: HOSPADM

## 2024-10-07 RX ORDER — ACETAMINOPHEN 160 MG/5ML
650 SOLUTION ORAL EVERY 4 HOURS PRN
Status: DISCONTINUED | OUTPATIENT
Start: 2024-10-07 | End: 2024-10-08 | Stop reason: HOSPADM

## 2024-10-07 RX ORDER — SODIUM CHLORIDE 9 MG/ML
125 INJECTION, SOLUTION INTRAVENOUS CONTINUOUS
Status: DISCONTINUED | OUTPATIENT
Start: 2024-10-07 | End: 2024-10-08

## 2024-10-07 RX ORDER — DEXTROSE 50 % IN WATER (D50W) INTRAVENOUS SYRINGE
25
Status: DISCONTINUED | OUTPATIENT
Start: 2024-10-07 | End: 2024-10-08 | Stop reason: HOSPADM

## 2024-10-07 RX ORDER — TALC
3 POWDER (GRAM) TOPICAL NIGHTLY PRN
Status: DISCONTINUED | OUTPATIENT
Start: 2024-10-07 | End: 2024-10-08 | Stop reason: HOSPADM

## 2024-10-07 RX ORDER — INSULIN LISPRO 100 [IU]/ML
0-5 INJECTION, SOLUTION INTRAVENOUS; SUBCUTANEOUS
Status: DISCONTINUED | OUTPATIENT
Start: 2024-10-08 | End: 2024-10-08 | Stop reason: HOSPADM

## 2024-10-07 RX ORDER — ONDANSETRON HYDROCHLORIDE 2 MG/ML
4 INJECTION, SOLUTION INTRAVENOUS EVERY 8 HOURS PRN
Status: DISCONTINUED | OUTPATIENT
Start: 2024-10-07 | End: 2024-10-08 | Stop reason: SDUPTHER

## 2024-10-07 RX ORDER — ONDANSETRON 4 MG/1
4 TABLET, FILM COATED ORAL EVERY 8 HOURS PRN
Status: DISCONTINUED | OUTPATIENT
Start: 2024-10-07 | End: 2024-10-08 | Stop reason: SDUPTHER

## 2024-10-07 RX ORDER — METOPROLOL SUCCINATE 50 MG/1
100 TABLET, EXTENDED RELEASE ORAL DAILY
Status: DISCONTINUED | OUTPATIENT
Start: 2024-10-08 | End: 2024-10-08 | Stop reason: HOSPADM

## 2024-10-07 RX ORDER — ACETAMINOPHEN 325 MG/1
650 TABLET ORAL EVERY 4 HOURS PRN
Status: DISCONTINUED | OUTPATIENT
Start: 2024-10-07 | End: 2024-10-08 | Stop reason: HOSPADM

## 2024-10-07 RX ORDER — POLYETHYLENE GLYCOL 3350 17 G/17G
17 POWDER, FOR SOLUTION ORAL DAILY
Status: DISCONTINUED | OUTPATIENT
Start: 2024-10-08 | End: 2024-10-08 | Stop reason: HOSPADM

## 2024-10-07 RX ORDER — GADOTERATE MEGLUMINE 376.9 MG/ML
20 INJECTION INTRAVENOUS
Status: COMPLETED | OUTPATIENT
Start: 2024-10-07 | End: 2024-10-07

## 2024-10-07 RX ORDER — DEXTROSE 50 % IN WATER (D50W) INTRAVENOUS SYRINGE
12.5
Status: DISCONTINUED | OUTPATIENT
Start: 2024-10-07 | End: 2024-10-08 | Stop reason: HOSPADM

## 2024-10-07 SDOH — SOCIAL STABILITY: SOCIAL INSECURITY: WITHIN THE LAST YEAR, HAVE YOU BEEN HUMILIATED OR EMOTIONALLY ABUSED IN OTHER WAYS BY YOUR PARTNER OR EX-PARTNER?: NO

## 2024-10-07 SDOH — ECONOMIC STABILITY: INCOME INSECURITY: IN THE LAST 12 MONTHS, WAS THERE A TIME WHEN YOU WERE NOT ABLE TO PAY THE MORTGAGE OR RENT ON TIME?: NO

## 2024-10-07 SDOH — ECONOMIC STABILITY: HOUSING INSECURITY: AT ANY TIME IN THE PAST 12 MONTHS, WERE YOU HOMELESS OR LIVING IN A SHELTER (INCLUDING NOW)?: NO

## 2024-10-07 SDOH — SOCIAL STABILITY: SOCIAL INSECURITY
WITHIN THE LAST YEAR, HAVE TO BEEN RAPED OR FORCED TO HAVE ANY KIND OF SEXUAL ACTIVITY BY YOUR PARTNER OR EX-PARTNER?: NO

## 2024-10-07 SDOH — HEALTH STABILITY: MENTAL HEALTH: HOW OFTEN DO YOU HAVE A DRINK CONTAINING ALCOHOL?: NEVER

## 2024-10-07 SDOH — SOCIAL STABILITY: SOCIAL INSECURITY: WITHIN THE LAST YEAR, HAVE YOU BEEN AFRAID OF YOUR PARTNER OR EX-PARTNER?: NO

## 2024-10-07 SDOH — HEALTH STABILITY: MENTAL HEALTH: HOW OFTEN DO YOU HAVE 6 OR MORE DRINKS ON ONE OCCASION?: NEVER

## 2024-10-07 SDOH — ECONOMIC STABILITY: FOOD INSECURITY: WITHIN THE PAST 12 MONTHS, THE FOOD YOU BOUGHT JUST DIDN'T LAST AND YOU DIDN'T HAVE MONEY TO GET MORE.: NEVER TRUE

## 2024-10-07 SDOH — ECONOMIC STABILITY: TRANSPORTATION INSECURITY
IN THE PAST 12 MONTHS, HAS LACK OF TRANSPORTATION KEPT YOU FROM MEETINGS, WORK, OR FROM GETTING THINGS NEEDED FOR DAILY LIVING?: NO

## 2024-10-07 SDOH — ECONOMIC STABILITY: TRANSPORTATION INSECURITY
IN THE PAST 12 MONTHS, HAS THE LACK OF TRANSPORTATION KEPT YOU FROM MEDICAL APPOINTMENTS OR FROM GETTING MEDICATIONS?: NO

## 2024-10-07 SDOH — ECONOMIC STABILITY: HOUSING INSECURITY: IN THE PAST 12 MONTHS, HOW MANY TIMES HAVE YOU MOVED WHERE YOU WERE LIVING?: 1

## 2024-10-07 SDOH — ECONOMIC STABILITY: FOOD INSECURITY: WITHIN THE PAST 12 MONTHS, YOU WORRIED THAT YOUR FOOD WOULD RUN OUT BEFORE YOU GOT MONEY TO BUY MORE.: NEVER TRUE

## 2024-10-07 SDOH — SOCIAL STABILITY: SOCIAL INSECURITY
WITHIN THE LAST YEAR, HAVE YOU BEEN KICKED, HIT, SLAPPED, OR OTHERWISE PHYSICALLY HURT BY YOUR PARTNER OR EX-PARTNER?: NO

## 2024-10-07 SDOH — ECONOMIC STABILITY: INCOME INSECURITY: HOW HARD IS IT FOR YOU TO PAY FOR THE VERY BASICS LIKE FOOD, HOUSING, MEDICAL CARE, AND HEATING?: NOT HARD AT ALL

## 2024-10-07 SDOH — ECONOMIC STABILITY: INCOME INSECURITY: IN THE PAST 12 MONTHS, HAS THE ELECTRIC, GAS, OIL, OR WATER COMPANY THREATENED TO SHUT OFF SERVICE IN YOUR HOME?: NO

## 2024-10-07 SDOH — HEALTH STABILITY: MENTAL HEALTH: HOW MANY STANDARD DRINKS CONTAINING ALCOHOL DO YOU HAVE ON A TYPICAL DAY?: PATIENT DOES NOT DRINK

## 2024-10-07 ASSESSMENT — ACTIVITIES OF DAILY LIVING (ADL)
ADEQUATE_TO_COMPLETE_ADL: YES
DRESSING YOURSELF: INDEPENDENT
FEEDING YOURSELF: INDEPENDENT
PATIENT'S MEMORY ADEQUATE TO SAFELY COMPLETE DAILY ACTIVITIES?: YES
JUDGMENT_ADEQUATE_SAFELY_COMPLETE_DAILY_ACTIVITIES: YES
ASSISTIVE_DEVICE: EYEGLASSES
HEARING - RIGHT EAR: FUNCTIONAL
WALKS IN HOME: INDEPENDENT
LACK_OF_TRANSPORTATION: NO
HEARING - LEFT EAR: FUNCTIONAL
BATHING: INDEPENDENT
GROOMING: INDEPENDENT
TOILETING: INDEPENDENT

## 2024-10-07 ASSESSMENT — COGNITIVE AND FUNCTIONAL STATUS - GENERAL
MOBILITY SCORE: 24
PATIENT BASELINE BEDBOUND: NO
DAILY ACTIVITIY SCORE: 24

## 2024-10-07 ASSESSMENT — LIFESTYLE VARIABLES
HAVE PEOPLE ANNOYED YOU BY CRITICIZING YOUR DRINKING: NO
SKIP TO QUESTIONS 9-10: 1
HAVE YOU EVER FELT YOU SHOULD CUT DOWN ON YOUR DRINKING: NO
AUDIT-C TOTAL SCORE: 0
EVER HAD A DRINK FIRST THING IN THE MORNING TO STEADY YOUR NERVES TO GET RID OF A HANGOVER: NO
EVER FELT BAD OR GUILTY ABOUT YOUR DRINKING: NO
TOTAL SCORE: 0

## 2024-10-07 ASSESSMENT — PAIN SCALES - GENERAL: PAINLEVEL_OUTOF10: 0 - NO PAIN

## 2024-10-07 ASSESSMENT — PAIN - FUNCTIONAL ASSESSMENT: PAIN_FUNCTIONAL_ASSESSMENT: 0-10

## 2024-10-07 NOTE — ED PROVIDER NOTES
HPI   Chief Complaint   Patient presents with    thrombus in sinus       Patient is a 62-year-old female with history of Crohn's disease hypertension hyperlipidemia diabetes mellitus who had an outpatient MRI done for nausea and vomiting by gastroenterology showed sinus thrombosis and sent here for evaluation.  No headaches no nausea no vomiting right now              Patient History   Past Medical History:   Diagnosis Date    Abnormal weight gain     Weight gain, abnormal    Arrhythmia     Arthritis     COVID-19     VACCINATED    Diabetes mellitus (Multi)     Hyperlipidemia     Hypertension     Irregular heart beat     Irritable bowel syndrome     Joint pain     Nephrolithiasis     Other abnormal glucose     Abnormal glucose    Other conditions influencing health status     Neck sprain and strain    Personal history of other diseases of the circulatory system     History of hypertension    Personal history of other diseases of the musculoskeletal system and connective tissue     History of degenerative disc disease    Radiculopathy, lumbosacral region     Lumbosacral neuritis    Seasonal allergies     Sprain of ligaments of thoracic spine, initial encounter     Thoracic sprain and strain    Type 2 diabetes mellitus     Urinary tract infection     Wears glasses      Past Surgical History:   Procedure Laterality Date    CARPAL TUNNEL RELEASE      DILATION AND CURETTAGE OF UTERUS      ENDOMETRIAL ABLATION      HAND SURGERY  03/18/2015    Hand Surgery                                                                                                                                                          JOINT REPLACEMENT Bilateral     bilateral knees    KNEE ARTHROSCOPY W/ DEBRIDEMENT  03/18/2015    Arthroscopy Knee Right    LIPOMA RESECTION Left     left arm    TONSILLECTOMY  03/18/2015    Tonsillectomy    TUBAL LIGATION  03/18/2015    Tubal Ligation     Family History   Problem Relation Name Age of Onset    Uterine  cancer Mother      Hypertension Father      Thyroid cancer Sister      Diabetes Brother      Graves' disease Brother      Multiple sclerosis Brother      Diabetes Maternal Grandmother      Skin cancer Maternal Grandmother      Hypertension Maternal Grandfather      Diabetes Paternal Grandfather      Hypertension Paternal Grandfather       Social History     Tobacco Use    Smoking status: Former     Current packs/day: 0.00     Average packs/day: 0.5 packs/day for 10.0 years (5.0 ttl pk-yrs)     Types: Cigarettes     Start date:      Quit date:      Years since quittin.7    Smokeless tobacco: Never   Vaping Use    Vaping status: Never Used   Substance Use Topics    Alcohol use: Never    Drug use: Never       Physical Exam   ED Triage Vitals [10/07/24 1714]   Temperature Heart Rate Respirations BP   36 °C (96.8 °F) 71 18 (!) 172/93      Pulse Ox Temp Source Heart Rate Source Patient Position   98 % Temporal Monitor --      BP Location FiO2 (%)     -- --       Physical Exam  Vitals and nursing note reviewed.   Constitutional:       Appearance: Normal appearance.   Eyes:      Extraocular Movements: Extraocular movements intact.      Pupils: Pupils are equal, round, and reactive to light.   Cardiovascular:      Rate and Rhythm: Normal rate and regular rhythm.   Pulmonary:      Effort: Pulmonary effort is normal.      Breath sounds: Normal breath sounds.   Abdominal:      Palpations: Abdomen is soft.   Musculoskeletal:         General: Normal range of motion.   Neurological:      General: No focal deficit present.      Mental Status: She is alert and oriented to person, place, and time.           ED Course & MDM   Diagnoses as of 10/07/24 2242   Cerebral venous thrombosis of sigmoid sinus ([facility])                 No data recorded     Nik Coma Scale Score: 15 (10/07/24 1858 : Lorena Penn RN)                           Medical Decision Making  I reviewed the patient's MRI results of the brain from  October 4, at this time I reached out to her neurologist Dr. Hernandez  who recommended MRV with and without contrast and also an MRI of the brain.  Explained to the patient and she was agreeable to get the imaging done the imaging was done and at this time reviewed by radiology with bilateral venous thrombosis, at this time based upon the initial consultation with neurology patient was started on IV fluids patient is otherwise stable at this time neurology team will see the patient in the morning and decide what type of anticoagulation and if so when then can be started for the patient.  I discussed with the hospitalist and was agreeable to admit the patient to the service.  Labs Reviewed  CBC WITH AUTO DIFFERENTIAL - Abnormal     WBC                           8.3                    nRBC                          0.0                    RBC                           5.40 (*)               Hemoglobin                    14.8                   Hematocrit                    46.2 (*)               MCV                           86                     MCH                           27.4                   MCHC                          32.0                   RDW                           14.6 (*)               Platelets                     341                    Neutrophils %                 70.2                   Immature Granulocytes %, Automated   0.4                    Lymphocytes %                 19.6                   Monocytes %                   7.3                    Eosinophils %                 1.7                    Basophils %                   0.8                    Neutrophils Absolute          5.81                   Immature Granulocytes Absolute, Au*   0.03                   Lymphocytes Absolute          1.62                   Monocytes Absolute            0.60                   Eosinophils Absolute          0.14                   Basophils Absolute            0.07                BASIC METABOLIC PANEL -  Abnormal     Glucose                       143 (*)                Sodium                        141                    Potassium                     3.9                    Chloride                      104                    Bicarbonate                   27                     Anion Gap                     14                     Urea Nitrogen                 7                      Creatinine                    0.75                   eGFR                          90                     Calcium                       9.8                 PROTIME-INR - Normal     Protime                       12.5                   INR                           1.1                 APTT - Normal     aPTT                          36                       Narrative: The APTT is no longer used for monitoring Unfractionated Heparin Therapy. For monitoring Heparin Therapy, use the Heparin Assay.  GRAY TOP     Extra Tube                                        FACTOR V LEIDEN  ANTITHROMBIN III ANTIGEN  PROTEIN C ACTIVITY  PROTEIN S ACTIVITY  JAK2 V617F MUTATION BY PCR, QUAL     MR venography intracranial w and wo IV contrast   Final Result    MRV:          Bilateral filling defects in sigmoid sinus persisting correspond to    recent MRI measuring up to 2.7 cm on the left and 0.8 cm on the right    concerning for thrombus.          2 cm enhancing left frontal extra-axial lesion compatible with a    meningioma better delineated on recent MRI.          MRA:          No evidence of major vessel cutoff or significant stenosis on MRA    head and neck.          MACRO:    None          Signed by: Liz Espino 10/7/2024 10:06 PM    Dictation workstation:   [id number]     MR angio head wo IV contrast   Final Result    MRV:          Bilateral filling defects in sigmoid sinus persisting correspond to    recent MRI measuring up to 2.7 cm on the left and 0.8 cm on the right    concerning for thrombus.          2 cm enhancing left frontal extra-axial  lesion compatible with a    meningioma better delineated on recent MRI.          MRA:          No evidence of major vessel cutoff or significant stenosis on MRA    head and neck.          MACRO:    None          Signed by: Liz Espino 10/7/2024 10:06 PM    Dictation workstation:   [id number]                Procedure  Procedures     Chelsey Fields MD  10/07/24 2303

## 2024-10-08 ENCOUNTER — PHARMACY VISIT (OUTPATIENT)
Dept: PHARMACY | Facility: CLINIC | Age: 64
End: 2024-10-08
Payer: COMMERCIAL

## 2024-10-08 ENCOUNTER — TELEPHONE (OUTPATIENT)
Dept: GASTROENTEROLOGY | Facility: CLINIC | Age: 64
End: 2024-10-08
Payer: COMMERCIAL

## 2024-10-08 VITALS
RESPIRATION RATE: 15 BRPM | HEART RATE: 63 BPM | TEMPERATURE: 96.8 F | BODY MASS INDEX: 40.99 KG/M2 | DIASTOLIC BLOOD PRESSURE: 77 MMHG | HEIGHT: 64 IN | SYSTOLIC BLOOD PRESSURE: 165 MMHG | OXYGEN SATURATION: 94 % | WEIGHT: 240.08 LBS

## 2024-10-08 LAB
GLUCOSE BLD MANUAL STRIP-MCNC: 128 MG/DL (ref 74–99)
GLUCOSE BLD MANUAL STRIP-MCNC: 169 MG/DL (ref 74–99)
PROT C ACT/NOR PPP CHRO: 130 % ACTIVITY (ref 70–150)
PROT S ACT/NOR PPP: 106 % ACTIVITY (ref 64–150)

## 2024-10-08 PROCEDURE — 2500000002 HC RX 250 W HCPCS SELF ADMINISTERED DRUGS (ALT 637 FOR MEDICARE OP, ALT 636 FOR OP/ED): Performed by: STUDENT IN AN ORGANIZED HEALTH CARE EDUCATION/TRAINING PROGRAM

## 2024-10-08 PROCEDURE — 2500000004 HC RX 250 GENERAL PHARMACY W/ HCPCS (ALT 636 FOR OP/ED): Performed by: STUDENT IN AN ORGANIZED HEALTH CARE EDUCATION/TRAINING PROGRAM

## 2024-10-08 PROCEDURE — 99232 SBSQ HOSP IP/OBS MODERATE 35: CPT | Performed by: STUDENT IN AN ORGANIZED HEALTH CARE EDUCATION/TRAINING PROGRAM

## 2024-10-08 PROCEDURE — 2500000001 HC RX 250 WO HCPCS SELF ADMINISTERED DRUGS (ALT 637 FOR MEDICARE OP): Performed by: STUDENT IN AN ORGANIZED HEALTH CARE EDUCATION/TRAINING PROGRAM

## 2024-10-08 PROCEDURE — 99239 HOSP IP/OBS DSCHRG MGMT >30: CPT | Performed by: STUDENT IN AN ORGANIZED HEALTH CARE EDUCATION/TRAINING PROGRAM

## 2024-10-08 PROCEDURE — G0378 HOSPITAL OBSERVATION PER HR: HCPCS

## 2024-10-08 PROCEDURE — 99223 1ST HOSP IP/OBS HIGH 75: CPT | Performed by: PSYCHIATRY & NEUROLOGY

## 2024-10-08 PROCEDURE — 82947 ASSAY GLUCOSE BLOOD QUANT: CPT

## 2024-10-08 PROCEDURE — RXMED WILLOW AMBULATORY MEDICATION CHARGE

## 2024-10-08 RX ORDER — ASCORBIC ACID 250 MG
1000 TABLET ORAL DAILY
Status: DISCONTINUED | OUTPATIENT
Start: 2024-10-08 | End: 2024-10-08 | Stop reason: HOSPADM

## 2024-10-08 RX ORDER — GLIMEPIRIDE 2 MG/1
4 TABLET ORAL 2 TIMES DAILY
Status: DISCONTINUED | OUTPATIENT
Start: 2024-10-08 | End: 2024-10-08 | Stop reason: HOSPADM

## 2024-10-08 RX ORDER — ONDANSETRON 4 MG/1
8 TABLET, ORALLY DISINTEGRATING ORAL EVERY 8 HOURS PRN
Status: DISCONTINUED | OUTPATIENT
Start: 2024-10-08 | End: 2024-10-08 | Stop reason: HOSPADM

## 2024-10-08 RX ORDER — METFORMIN HYDROCHLORIDE 500 MG/1
1000 TABLET ORAL
Status: DISCONTINUED | OUTPATIENT
Start: 2024-10-08 | End: 2024-10-08 | Stop reason: HOSPADM

## 2024-10-08 RX ORDER — CARISOPRODOL 350 MG/1
350 TABLET ORAL DAILY
Status: DISCONTINUED | OUTPATIENT
Start: 2024-10-08 | End: 2024-10-08 | Stop reason: HOSPADM

## 2024-10-08 RX ORDER — L. ACIDOPHILUS/L.BULGARICUS 1MM CELL
1 TABLET ORAL 2 TIMES DAILY
Status: DISCONTINUED | OUTPATIENT
Start: 2024-10-08 | End: 2024-10-08 | Stop reason: HOSPADM

## 2024-10-08 RX ORDER — LANOLIN ALCOHOL/MO/W.PET/CERES
400 CREAM (GRAM) TOPICAL DAILY
Status: DISCONTINUED | OUTPATIENT
Start: 2024-10-08 | End: 2024-10-08 | Stop reason: HOSPADM

## 2024-10-08 RX ORDER — LOSARTAN POTASSIUM 100 MG/1
100 TABLET ORAL DAILY
Status: DISCONTINUED | OUTPATIENT
Start: 2024-10-08 | End: 2024-10-08 | Stop reason: HOSPADM

## 2024-10-08 SDOH — SOCIAL STABILITY: SOCIAL INSECURITY: ARE YOU OR HAVE YOU BEEN THREATENED OR ABUSED PHYSICALLY, EMOTIONALLY, OR SEXUALLY BY ANYONE?: NO

## 2024-10-08 SDOH — SOCIAL STABILITY: SOCIAL INSECURITY: DO YOU FEEL ANYONE HAS EXPLOITED OR TAKEN ADVANTAGE OF YOU FINANCIALLY OR OF YOUR PERSONAL PROPERTY?: NO

## 2024-10-08 SDOH — SOCIAL STABILITY: SOCIAL INSECURITY: HAVE YOU HAD ANY THOUGHTS OF HARMING ANYONE ELSE?: NO

## 2024-10-08 SDOH — SOCIAL STABILITY: SOCIAL INSECURITY: HAVE YOU HAD THOUGHTS OF HARMING ANYONE ELSE?: NO

## 2024-10-08 SDOH — SOCIAL STABILITY: SOCIAL INSECURITY: ARE THERE ANY APPARENT SIGNS OF INJURIES/BEHAVIORS THAT COULD BE RELATED TO ABUSE/NEGLECT?: NO

## 2024-10-08 SDOH — SOCIAL STABILITY: SOCIAL INSECURITY: DO YOU FEEL UNSAFE GOING BACK TO THE PLACE WHERE YOU ARE LIVING?: NO

## 2024-10-08 SDOH — SOCIAL STABILITY: SOCIAL INSECURITY: DOES ANYONE TRY TO KEEP YOU FROM HAVING/CONTACTING OTHER FRIENDS OR DOING THINGS OUTSIDE YOUR HOME?: NO

## 2024-10-08 SDOH — SOCIAL STABILITY: SOCIAL INSECURITY: WERE YOU ABLE TO COMPLETE ALL THE BEHAVIORAL HEALTH SCREENINGS?: YES

## 2024-10-08 SDOH — SOCIAL STABILITY: SOCIAL INSECURITY: HAS ANYONE EVER THREATENED TO HURT YOUR FAMILY OR YOUR PETS?: NO

## 2024-10-08 SDOH — SOCIAL STABILITY: SOCIAL INSECURITY: ABUSE: ADULT

## 2024-10-08 ASSESSMENT — COGNITIVE AND FUNCTIONAL STATUS - GENERAL
MOBILITY SCORE: 24
DAILY ACTIVITIY SCORE: 24

## 2024-10-08 ASSESSMENT — PAIN SCALES - GENERAL: PAINLEVEL_OUTOF10: 0 - NO PAIN

## 2024-10-08 ASSESSMENT — LIFESTYLE VARIABLES
AUDIT-C TOTAL SCORE: 0
HOW OFTEN DO YOU HAVE 6 OR MORE DRINKS ON ONE OCCASION: NEVER
HOW MANY STANDARD DRINKS CONTAINING ALCOHOL DO YOU HAVE ON A TYPICAL DAY: PATIENT DOES NOT DRINK
HOW OFTEN DO YOU HAVE A DRINK CONTAINING ALCOHOL: NEVER
AUDIT-C TOTAL SCORE: 0
SKIP TO QUESTIONS 9-10: 1

## 2024-10-08 NOTE — CARE PLAN
Problem: Pain - Adult  Goal: Verbalizes/displays adequate comfort level or baseline comfort level  Outcome: Progressing     Problem: Discharge Planning  Goal: Discharge to home or other facility with appropriate resources  Outcome: Progressing     Problem: Chronic Conditions and Co-morbidities  Goal: Patient's chronic conditions and co-morbidity symptoms are monitored and maintained or improved  Outcome: Progressing     Problem: Diabetes  Goal: Achieve decreasing blood glucose levels by end of shift  Outcome: Progressing  Goal: Increase stability of blood glucose readings by end of shift  Outcome: Progressing  Goal: Decrease in ketones present in urine by end of shift  Outcome: Progressing  Goal: Maintain electrolyte levels within acceptable range throughout shift  Outcome: Progressing  Goal: Maintain glucose levels >70mg/dl to <250mg/dl throughout shift  Outcome: Progressing  Goal: No changes in neurological exam by end of shift  Outcome: Progressing  Goal: Learn about and adhere to nutrition recommendations by end of shift  Outcome: Progressing  Goal: Vital signs within normal range for age by end of shift  Outcome: Progressing  Goal: Increase self care and/or family involovement by end of shift  Outcome: Progressing  Goal: Receive DSME education by end of shift  Outcome: Progressing   The patient's goals for the shift include rest     The clinical goals for the shift include Patient will remain free from injury throughout shift

## 2024-10-08 NOTE — PROGRESS NOTES
Savi Govea is a 64 y.o. female on day 0 of admission presenting with Cerebral venous sinus thrombosis ([facility]).      Subjective   Stable, no acute distress, no complaint, sitting on her bed    Objective     Last Recorded Vitals  /81   Pulse 60   Temp 36.4 °C (97.5 °F)   Resp 15   Wt 109 kg (240 lb 1.3 oz)   SpO2 96%   Intake/Output last 3 Shifts:    Intake/Output Summary (Last 24 hours) at 10/8/2024 0959  Last data filed at 10/8/2024 0821  Gross per 24 hour   Intake 750 ml   Output --   Net 750 ml       Admission Weight  Weight: 108 kg (238 lb) (10/07/24 1714)    Daily Weight  10/07/24 : 109 kg (240 lb 1.3 oz)    Image Results  MR venography intracranial w and wo IV contrast, MR angio head wo IV contrast  Narrative: Interpreted By:  Liz Espino,   STUDY:  MR VENOGRAPHY INTRACRANIAL W AND WO IV CONTRAST; MR ANGIO HEAD WO IV  CONTRAST;  10/7/2024 8:40 pm      INDICATION:  Signs/Symptoms:Headache and sent MRI done showed sinus venous  thrombosis; Signs/Symptoms:headache.  Stroke protocol.          COMPARISON:  MRI brain 10/04/2024      ACCESSION NUMBER(S):  [id number]; [id number]      ORDERING CLINICIAN:  MOJGAN DAVIS      TECHNIQUE:  MRV of the head was performed following administration of 20 mL  dotarem. The images were reviewed as source images and maximum  intensity projections.      Time-of-flight MRA of the head   was performed. The images were  reviewed as source images and maximum intensity projections.      FINDINGS:      MRV:      Redemonstrated filling defects noted in the bilateral sigmoid sinus.  A region of filling defect on the left measures up to 2.7 cm and  persists on delayed phase post-contrast enhanced imaging. There is a  redemonstrated small persistent, nonocclusive filling defect on the  right measuring up to 8 mm.      An eccentric small filling defect in the right straight sinus likely  an arachnoid cyst.      The superior sagittal sinus, straight sinus, and deep  internal  cerebral veins are patent. The transverse sinuses, sigmoid sinuses,  and included portions of the internal jugular veins are patent.      Redemonstrated enhancing extra-axial lesion over the left frontal  lobe measuring at least 2.1 cm, better evaluated on prior MRI however  compatible with an meningioma with mild local mass effect.          MRA of head:      Anterior circulation:    There is expected flow signal in bilateral  intracranial internal carotid arteries, bilateral carotid terminals,  bilateral proximal anterior and middle cerebral arteries.      Posterior circulation:    Bilateral intracranial vertebral arteries,  vertebrobasilar junction, basilar artery and proximal posterior  cerebral arteries demonstrate expected flow signal.      Impression: MRV:      Bilateral filling defects in sigmoid sinus persisting correspond to  recent MRI measuring up to 2.7 cm on the left and 0.8 cm on the right  concerning for thrombus.      2 cm enhancing left frontal extra-axial lesion compatible with a  meningioma better delineated on recent MRI.      MRA:      No evidence of major vessel cutoff or significant stenosis on MRA  head and neck.      MACRO:  None      Signed by: Liz Espino 10/7/2024 10:06 PM  Dictation workstation:   [id number]  MR brain w and wo IV contrast  Narrative: Interpreted By:  Leah Chaudhry,  and Dianna Manriquez   STUDY:  MR BRAIN W AND WO IV CONTRAST;  10/4/2024 7:00 pm      INDICATION:  Signs/Symptoms:evaluate for intracranial lesion. positional nausea.  64-year-old female with history of diabetes and newly diagnosed  Crohn's disease who presents recurrent nausea. ,R11.2 Nausea with  vomiting, unspecified      COMPARISON:  MR cervical spine 12/07/2010.      ACCESSION NUMBER(S):  [id number]      ORDERING CLINICIAN:  REI ARCHULETA      TECHNIQUE:  Axial T2, FLAIR, DWI, gradient echo T2 and T1 weighted images of  brain were acquired. Post contrast T1 weighted images were  acquired  after administration of 20 mL Dotarem gadolinium based intravenous  contrast.      FINDINGS:  CSF Spaces: Incidental note of an extra-axial dural-based enhancing  mass measuring 2 x 1.7 x 1.5 cm (AP x CC x TV) along the anterior  superior left parasagittal frontal lobe. It demonstrates mass effect  with effacement of the adjacent sulci. The ventricles, sulci and  basal cisterns are otherwise within normal limits.      Parenchyma: There is no diffusion restriction abnormality to suggest  acute infarct.  Mild burden of scattered patchy and confluent  T2/FLAIR hyperintensities are noted throughout the periventricular  and subcortical white matter, nonspecific, but likely represent  chronic small vessel ischemic changes given patient's age.  There is  no midline shift.      Paranasal Sinuses and Mastoids: Visualized paranasal sinuses and  mastoid air cells are unremarkable.      A short-segment filling defect is noted within the left sigmoid sinus  (series 14, image 69) with associated slow flow within the left  transverse sinus on FLAIR imaging (series 5, image 11). An additional  focal filling defect is noted within the right sigmoid sinus (series  14, image 65). No definite associated slow flow signal on FLAIR.      Impression: 1. Filling defect within the left sigmoid sinus with slow flow in the  left transverse sinus, suggestive of thrombus. Additional filling  defect within the right sigmoid sinus without definitive slow flow in  the right transverse sinus, which may represent additional  nonocclusive thrombus versus arachnoid granulation. No parenchymal  edema or venous infarct.  2. Incidental note of a 2 cm extra-axial dural-based enhancing mass  along the left parasagittal frontal with mild local mass effect,  likely representing a meningioma.  3. Mild burden of chronic small vessel ischemic changes.      I personally reviewed the images/study and I agree with the findings  as stated by Mitra  MD Dianna. This study was interpreted at  [facility], [place], [place].      MACRO:  Critical Finding:  See findings. Notification was initiated on  10/7/2024 at 3:54 pm by  Leah Chaudhry.  (**-OCF-**)      Signed by: Leah Chaudhry 10/7/2024 3:54 PM  Dictation workstation:   [id number]      Physical Exam    General: Well-developed obese female, in no acute distress  HEENT: AT, NC, no JVD, no lymphadenopathy, neck supple  Lungs: Clear, no wheezing, no crackles  Cardiac: Normal S1-S2, no murmur, no gallop  Abdomen: Soft, nontender, no distention, positive bowel sound  Extremities: No deformity, no edema, pulses intact, ROM intact  Neurological: Alert awake oriented x3, sensation intact, clear speech        Assessment & Plan  Cerebral venous sinus thrombosis ([facility])    Type 2 diabetes mellitus without complication, without long-term current use of insulin (Multi)      Savi Govea is a 64 y.o. female with a PMH of DM 2, HTN, Crohn's disease presenting with nausea/vomiting.  Patient was recently diagnosed with Crohn's disease.  Her gastroenterologist ordered an MRI of her brain due to persistent nausea and vomiting.  The MRI showed concern for venous sinus thrombosis and patient was told to come to the ER.     #.  Cerebral venous sinus thrombosis  -P/w N/V, MRV showed bilateral filling defects in sigmoid sinus  -Pending neurology recs for today and regarding anticoagulation treatment, continue IVF  -Follow-up hypercoagulability labs  -Antiemetic as needed, pain management.     #.  Type 2 diabetes  -SSI, Accu-Cheks, hypoglycemic protocol  -Continue home Januvia     #.  HTN  -Continue home medications     VTE PPX: SCDs  Disposition: Home once hemodynamically stable      Erica Lloyd MD

## 2024-10-08 NOTE — H&P
Medical Group History and Physical      ASSESSMENT & PLAN:     #.  Cerebral venous sinus thrombosis  -P/w N/V, MRV shows bilateral filling defects in sigmoid sinus  -Neurology consulted in ER, recommend IV fluids and will discuss possible initiation of anticoagulation tomorrow  -Follow-up hypercoagulability labs  -Nausea control    #.  Type 2 diabetes  -SSI, Accu-Cheks, hypoglycemic protocol  -Continue home Januvia    #.  HTN  -Continue home medications    VTE PPX: SCDs      Ronal Neal MD    --Of note, this documentation is completed using the Dragon Dictation system (voice recognition software). There may be spelling and/or grammatical errors that were not corrected prior to final submission.--    HISTORY OF PRESENT ILLNESS:   Chief Complaint: Nausea/vomiting    Savi Govea is a 64 y.o. female with a history of type 2 diabetes, hypertension, Crohn's disease presenting with nausea/vomiting.  Patient was recently diagnosed with Crohn's disease.  Her gastroenterologist ordered an MRI of her brain due to persistent nausea and vomiting.  The MRI showed concern for venous sinus thrombosis and patient was told to come to the ER.  Currently she is nausea free.  She denies any headaches or neurologic symptoms.      ER Course: /93, HR 71, RR 18, T36.0.  Labs unremarkable.  MRV was obtained which showed bilateral filling defects in sigmoid sinus concerning for thrombosis.  Neurology was consulted who recommended IV fluids.    ROS  10 point review of systems negative except per HPI     PAST HISTORIES:     Past Medical History  She has a past medical history of Abnormal weight gain, Arrhythmia, Arthritis, COVID-19, Diabetes mellitus (Multi), Hyperlipidemia, Hypertension, Irregular heart beat, Irritable bowel syndrome, Joint pain, Nephrolithiasis, Other abnormal glucose, Other conditions influencing health status, Personal history of other diseases of the circulatory system, Personal history of other  diseases of the musculoskeletal system and connective tissue, Radiculopathy, lumbosacral region, Seasonal allergies, Sprain of ligaments of thoracic spine, initial encounter, Type 2 diabetes mellitus, Urinary tract infection, and Wears glasses.    Surgical History  She has a past surgical history that includes Tonsillectomy (03/18/2015); Tubal ligation (03/18/2015); Hand surgery (03/18/2015); Knee arthroscopy w/ debridement (03/18/2015); Endometrial ablation; Dilation and curettage of uterus; Joint replacement (Bilateral); Carpal tunnel release; and Lipoma resection (Left).     Social History  She reports that she quit smoking about 19 years ago. Her smoking use included cigarettes. She started smoking about 29 years ago. She has a 5 pack-year smoking history. She has never used smokeless tobacco. She reports that she does not drink alcohol and does not use drugs.    Family History  Family History   Problem Relation Name Age of Onset    Uterine cancer Mother      Hypertension Father      Thyroid cancer Sister      Diabetes Brother      Graves' disease Brother      Multiple sclerosis Brother      Diabetes Maternal Grandmother      Skin cancer Maternal Grandmother      Hypertension Maternal Grandfather      Diabetes Paternal Grandfather      Hypertension Paternal Grandfather         Allergies:  Aspirin, Caffeine, Cefditoren, Cetirizine, Fexofenadine, Phenylephrine-guaifenesin, Pseudoephedrine-guaifenesin, Salicylates, and Spectracef [cefditoren pivoxil]      OBJECTIVE:      Last Recorded Vitals  /72 (BP Location: Left arm, Patient Position: Sitting)   Pulse 58   Temp 36 °C (96.8 °F) (Temporal)   Resp 18   Wt 108 kg (238 lb)   SpO2 98%     Last I/O:  No intake/output data recorded.    Physical Exam   Gen: NAD, appears stated age  HEENT: EOM, MMM  CV: RRR, no murmurs rubs or gallops  Resp: Clear to auscultation bilaterally, normal effort  Abdomen: soft, NT,+BS  LE: No edema, no deformity  Neuro: A&Ox4, moving  all extremities    LABS AND IMAGING:       Relevant Results  Labs Reviewed   CBC WITH AUTO DIFFERENTIAL - Abnormal       Result Value    WBC 8.3      nRBC 0.0      RBC 5.40 (*)     Hemoglobin 14.8      Hematocrit 46.2 (*)     MCV 86      MCH 27.4      MCHC 32.0      RDW 14.6 (*)     Platelets 341      Neutrophils % 70.2      Immature Granulocytes %, Automated 0.4      Lymphocytes % 19.6      Monocytes % 7.3      Eosinophils % 1.7      Basophils % 0.8      Neutrophils Absolute 5.81      Immature Granulocytes Absolute, Automated 0.03      Lymphocytes Absolute 1.62      Monocytes Absolute 0.60      Eosinophils Absolute 0.14      Basophils Absolute 0.07     BASIC METABOLIC PANEL - Abnormal    Glucose 143 (*)     Sodium 141      Potassium 3.9      Chloride 104      Bicarbonate 27      Anion Gap 14      Urea Nitrogen 7      Creatinine 0.75      eGFR 90      Calcium 9.8     PROTIME-INR - Normal    Protime 12.5      INR 1.1     APTT - Normal    aPTT 36      Narrative:     The APTT is no longer used for monitoring Unfractionated Heparin Therapy. For monitoring Heparin Therapy, use the Heparin Assay.   GRAY TOP    Extra Tube Hold for add-ons.     FACTOR V LEIDEN   ANTITHROMBIN III ANTIGEN   PROTEIN C ACTIVITY   PROTEIN S ACTIVITY   JAK2 V617F MUTATION BY PCR, QUAL     MR venography intracranial w and wo IV contrast   Final Result   MRV:        Bilateral filling defects in sigmoid sinus persisting correspond to   recent MRI measuring up to 2.7 cm on the left and 0.8 cm on the right   concerning for thrombus.        2 cm enhancing left frontal extra-axial lesion compatible with a   meningioma better delineated on recent MRI.        MRA:        No evidence of major vessel cutoff or significant stenosis on MRA   head and neck.        MACRO:   None        Signed by: Liz Espino 10/7/2024 10:06 PM   Dictation workstation:   [id number]      MR angio head wo IV contrast   Final Result   MRV:        Bilateral filling defects in  sigmoid sinus persisting correspond to   recent MRI measuring up to 2.7 cm on the left and 0.8 cm on the right   concerning for thrombus.        2 cm enhancing left frontal extra-axial lesion compatible with a   meningioma better delineated on recent MRI.        MRA:        No evidence of major vessel cutoff or significant stenosis on MRA   head and neck.        MACRO:   None        Signed by: Liz Espino 10/7/2024 10:06 PM   Dictation workstation:   [id number]

## 2024-10-08 NOTE — CONSULTS
"Inpatient consult to Neurology  Consult performed by: TUCKER Hernandez-CNP  Consult ordered by: Ronal Neal MD          History Of Present Illness  Savi Govea is a 64 y.o. female history of type 2 diabetes, hypertension, Crohn's disease presenting with nausea/vomiting.  Patient was recently diagnosed with Crohn's disease. Her gastroenterologist ordered an MRI of her brain due to persistent nausea and vomiting. The MRI showed concern for venous sinus thrombosis and patient was told to come to the ER.   ED Course: EKG NSR /93 71 18 98% 36*C MRA MRV confirmed 2.7 cm on the left and 0.8 cm on the right concerning for thrombus. Case was discussed with Dr. Hernandez and pt. Was started on IV fluids with hypercoagulability labs.   Pt. Seen and examined. She states nausea and vomiting have subsided. She had recently started Skyrizi for Chrohn's disease and had recently finished a course of steroids in September. She continues with fluids for venous sinus thrombosis, denies headache, nausea, dizziness, vision changes. States she feels \"fine\". She has a past history of bleeding with Xarelto s/p knee sugery, but has tolerated Eliquis in the past. Coagulation panel is pending.     Review of systems are negative unless otherwise specified in HPI.   Past Medical History  Past Medical History:   Diagnosis Date    Abnormal weight gain     Weight gain, abnormal    Arrhythmia     Arthritis     COVID-19     VACCINATED    Diabetes mellitus (Multi)     Hyperlipidemia     Hypertension     Irregular heart beat     Irritable bowel syndrome     Joint pain     Nephrolithiasis     Other abnormal glucose     Abnormal glucose    Other conditions influencing health status     Neck sprain and strain    Personal history of other diseases of the circulatory system     History of hypertension    Personal history of other diseases of the musculoskeletal system and connective tissue     History of degenerative disc disease    " Radiculopathy, lumbosacral region     Lumbosacral neuritis    Seasonal allergies     Sprain of ligaments of thoracic spine, initial encounter     Thoracic sprain and strain    Type 2 diabetes mellitus     Urinary tract infection     Wears glasses      Surgical History  Past Surgical History:   Procedure Laterality Date    CARPAL TUNNEL RELEASE      DILATION AND CURETTAGE OF UTERUS      ENDOMETRIAL ABLATION      HAND SURGERY  2015    Hand Surgery                                                                                                                                                          JOINT REPLACEMENT Bilateral     bilateral knees    KNEE ARTHROSCOPY W/ DEBRIDEMENT  2015    Arthroscopy Knee Right    LIPOMA RESECTION Left     left arm    TONSILLECTOMY  2015    Tonsillectomy    TUBAL LIGATION  2015    Tubal Ligation     Social History  Social History     Tobacco Use    Smoking status: Former     Current packs/day: 0.00     Average packs/day: 0.5 packs/day for 10.0 years (5.0 ttl pk-yrs)     Types: Cigarettes     Start date:      Quit date:      Years since quittin.7    Smokeless tobacco: Never   Vaping Use    Vaping status: Never Used   Substance Use Topics    Alcohol use: Never    Drug use: Never     Allergies  Aspirin, Caffeine, Cefditoren, Cetirizine, Fexofenadine, Phenylephrine-guaifenesin, Pseudoephedrine-guaifenesin, Salicylates, and Spectracef [cefditoren pivoxil]  Medications Prior to Admission   Medication Sig Dispense Refill Last Dose    alpha lipoic acid 300 mg capsule Take 1 capsule by mouth 2 times a day.       ammonium lactate (Lac-Hydrin) 12 % lotion Apply topically once daily as needed. As directed       ascorbic acid (Vitamin C) 1,000 mg tablet Take 1 tablet (1,000 mg) by mouth once daily.       calcium citrate-vitamin D3 200 mg-3.125 mcg (125 unit) tablet Take 1 tablet by mouth once daily.       CHROMIUM PICOLINATE ORAL Take 1 tablet by mouth once  daily.       Cinnamon 500 mg capsule Take 1 capsule (500 mg) by mouth 2 times a day.       glimepiride (Amaryl) 4 mg tablet Take 1 tablet (4 mg) by mouth 2 times a day.       irbesartan (Avapro) 300 mg tablet Take 1 tablet (300 mg) by mouth once daily.       Januvia 100 mg tablet Take 1 tablet (100 mg) by mouth once daily.       Jardiance 25 mg Take 1 tablet (25 mg) by mouth once daily.       magnesium oxide 400 mg magnesium capsule Take 1 capsule (400 mg) by mouth once daily.       metFORMIN (Glucophage) 1,000 mg tablet Take 1 tablet (1,000 mg) by mouth 2 times daily (morning and late afternoon).       metoprolol succinate XL (Kapspargo Sprinkle) 100 mg 24 hr capsule Take 1 capsule (100 mg) by mouth once daily.       multivitamin tablet Take 1 tablet by mouth once daily.       ondansetron ODT (Zofran-ODT) 8 mg disintegrating tablet Take 1 tablet (8 mg) by mouth every 8 hours if needed for nausea or vomiting. 27 tablet 1     OneTouch Delica Plus Lancet 30 gauge misc        OneTouch Verio test strips strip        pomegranate fruit extract 250 mg capsule Take 1 capsule by mouth 2 times a day.       potassium citrate 99 mg capsule Take 99 mg by mouth once daily.       risankizumab-rzaa (Skyrizi) 180 mg/1.2 mL (150 mg/mL) wearable injector injection Inject 1.2 mL (180 mg) under the skin every 8 (eight) weeks. 1.2 mL 11     saccharomyces boulardii (Florastor) 250 mg capsule Take 1 capsule (250 mg) by mouth 2 times a day.       Soma 350 mg tablet Take 1 tablet (350 mg) by mouth once daily.       zinc gluconate 50 mg tablet Take 1 tablet (50 mg) by mouth once daily.          Review of Systems  Neurological Exam  Mental Status  Awake, alert and oriented to person, place and time. Recent and remote memory are intact. Speech is normal. Language is fluent with no aphasia. Attention and concentration are normal.    Cranial Nerves  CN II: Right normal visual field. Left normal visual field.  CN III, IV, VI: Extraocular movements  intact bilaterally. No nystagmus.   Right pupil: 3 mm. Round. Reactive to light. Reactive to accommodation.   Left pupil: 3 mm. Round. Reactive to light. Reactive to accommodation.  CN V:  Right: Facial sensation is normal.  Left: Facial sensation is normal on the left.  CN VII:  Right: There is no facial weakness.  Left: There is no facial weakness.  CN VIII:  Right: Hearing is normal.  Left: Hearing is normal.  CN IX, X:  Right: Palate is normal.  Left: Palate is normal.  CN XI:  Right: Trapezius strength is normal.  Left: Trapezius strength is normal.  CN XII: Tongue midline without atrophy or fasciculations.    Motor  Normal muscle bulk throughout. Normal muscle tone. Strength is 5/5 throughout all four extremities.    Sensory  Light touch is normal in upper and lower extremities.     Reflexes  Deep tendon reflexes are 2+ and symmetric in all four extremities.    Coordination  Right: Finger-to-nose normal.Left: Finger-to-nose normal.    Physical Exam  HENT:      Right Ear: Hearing normal.      Left Ear: Hearing normal.   Eyes:      Extraocular Movements: EOM normal. No nystagmus.   Neurological:      Motor: Motor strength is normal.     Deep Tendon Reflexes: Reflexes are normal and symmetric.   Psychiatric:         Speech: Speech normal.         Results for orders placed or performed during the hospital encounter of 10/07/24 (from the past 24 hour(s))   CBC and Auto Differential   Result Value Ref Range    WBC 8.3 4.4 - 11.3 x10*3/uL    nRBC 0.0 0.0 - 0.0 /100 WBCs    RBC 5.40 (H) 4.00 - 5.20 x10*6/uL    Hemoglobin 14.8 12.0 - 16.0 g/dL    Hematocrit 46.2 (H) 36.0 - 46.0 %    MCV 86 80 - 100 fL    MCH 27.4 26.0 - 34.0 pg    MCHC 32.0 32.0 - 36.0 g/dL    RDW 14.6 (H) 11.5 - 14.5 %    Platelets 341 150 - 450 x10*3/uL    Neutrophils % 70.2 40.0 - 80.0 %    Immature Granulocytes %, Automated 0.4 0.0 - 0.9 %    Lymphocytes % 19.6 13.0 - 44.0 %    Monocytes % 7.3 2.0 - 10.0 %    Eosinophils % 1.7 0.0 - 6.0 %     Basophils % 0.8 0.0 - 2.0 %    Neutrophils Absolute 5.81 1.20 - 7.70 x10*3/uL    Immature Granulocytes Absolute, Automated 0.03 0.00 - 0.70 x10*3/uL    Lymphocytes Absolute 1.62 1.20 - 4.80 x10*3/uL    Monocytes Absolute 0.60 0.10 - 1.00 x10*3/uL    Eosinophils Absolute 0.14 0.00 - 0.70 x10*3/uL    Basophils Absolute 0.07 0.00 - 0.10 x10*3/uL   Basic metabolic panel   Result Value Ref Range    Glucose 143 (H) 74 - 99 mg/dL    Sodium 141 136 - 145 mmol/L    Potassium 3.9 3.5 - 5.3 mmol/L    Chloride 104 98 - 107 mmol/L    Bicarbonate 27 21 - 32 mmol/L    Anion Gap 14 10 - 20 mmol/L    Urea Nitrogen 7 6 - 23 mg/dL    Creatinine 0.75 0.50 - 1.05 mg/dL    eGFR 90 >60 mL/min/1.73m*2    Calcium 9.8 8.6 - 10.3 mg/dL   Protime-INR   Result Value Ref Range    Protime 12.5 9.8 - 12.8 seconds    INR 1.1 0.9 - 1.1   aPTT   Result Value Ref Range    aPTT 36 27 - 38 seconds   Lavender Top   Result Value Ref Range    Extra Tube Hold for add-ons.    Gray Top   Result Value Ref Range    Extra Tube Hold for add-ons.    Protein C Activity   Result Value Ref Range    Protein C Activity 130 70 - 150 % activity   Protein S Activity   Result Value Ref Range    Protein S Activity 106 64 - 150 % activity   POCT GLUCOSE   Result Value Ref Range    POCT Glucose 128 (H) 74 - 99 mg/dL   MR venography intracranial w and wo IV contrast    Result Date: 10/7/2024  Interpreted By:  Liz Espino, STUDY: MR VENOGRAPHY INTRACRANIAL W AND WO IV CONTRAST; MR ANGIO HEAD WO IV CONTRAST;  10/7/2024 8:40 pm   INDICATION: Signs/Symptoms:Headache and sent MRI done showed sinus venous thrombosis; Signs/Symptoms:headache.  Stroke protocol.     COMPARISON: MRI brain 10/04/2024   ACCESSION NUMBER(S): [id number]; [id number]   ORDERING CLINICIAN: MOJGAN DAVIS   TECHNIQUE: MRV of the head was performed following administration of 20 mL dotarem. The images were reviewed as source images and maximum intensity projections.   Time-of-flight MRA of the head    was performed. The images were reviewed as source images and maximum intensity projections.   FINDINGS:   MRV:   Redemonstrated filling defects noted in the bilateral sigmoid sinus. A region of filling defect on the left measures up to 2.7 cm and persists on delayed phase post-contrast enhanced imaging. There is a redemonstrated small persistent, nonocclusive filling defect on the right measuring up to 8 mm.   An eccentric small filling defect in the right straight sinus likely an arachnoid cyst.   The superior sagittal sinus, straight sinus, and deep internal cerebral veins are patent. The transverse sinuses, sigmoid sinuses, and included portions of the internal jugular veins are patent.   Redemonstrated enhancing extra-axial lesion over the left frontal lobe measuring at least 2.1 cm, better evaluated on prior MRI however compatible with an meningioma with mild local mass effect.     MRA of head:   Anterior circulation:    There is expected flow signal in bilateral intracranial internal carotid arteries, bilateral carotid terminals, bilateral proximal anterior and middle cerebral arteries.   Posterior circulation:    Bilateral intracranial vertebral arteries, vertebrobasilar junction, basilar artery and proximal posterior cerebral arteries demonstrate expected flow signal.       MRV:   Bilateral filling defects in sigmoid sinus persisting correspond to recent MRI measuring up to 2.7 cm on the left and 0.8 cm on the right concerning for thrombus.   2 cm enhancing left frontal extra-axial lesion compatible with a meningioma better delineated on recent MRI.   MRA:   No evidence of major vessel cutoff or significant stenosis on MRA head and neck.   MACRO: None   Signed by: Liz Espino 10/7/2024 10:06 PM Dictation workstation:   [id number]    MR angio head wo IV contrast    Result Date: 10/7/2024  Interpreted By:  Liz Espino, STUDY: MR VENOGRAPHY INTRACRANIAL W AND WO IV CONTRAST; MR ANGIO HEAD WO IV  CONTRAST;  10/7/2024 8:40 pm   INDICATION: Signs/Symptoms:Headache and sent MRI done showed sinus venous thrombosis; Signs/Symptoms:headache.  Stroke protocol.     COMPARISON: MRI brain 10/04/2024   ACCESSION NUMBER(S): [id number]; [id number]   ORDERING CLINICIAN: MOJGAN DAVIS   TECHNIQUE: MRV of the head was performed following administration of 20 mL dotarem. The images were reviewed as source images and maximum intensity projections.   Time-of-flight MRA of the head   was performed. The images were reviewed as source images and maximum intensity projections.   FINDINGS:   MRV:   Redemonstrated filling defects noted in the bilateral sigmoid sinus. A region of filling defect on the left measures up to 2.7 cm and persists on delayed phase post-contrast enhanced imaging. There is a redemonstrated small persistent, nonocclusive filling defect on the right measuring up to 8 mm.   An eccentric small filling defect in the right straight sinus likely an arachnoid cyst.   The superior sagittal sinus, straight sinus, and deep internal cerebral veins are patent. The transverse sinuses, sigmoid sinuses, and included portions of the internal jugular veins are patent.   Redemonstrated enhancing extra-axial lesion over the left frontal lobe measuring at least 2.1 cm, better evaluated on prior MRI however compatible with an meningioma with mild local mass effect.     MRA of head:   Anterior circulation:    There is expected flow signal in bilateral intracranial internal carotid arteries, bilateral carotid terminals, bilateral proximal anterior and middle cerebral arteries.   Posterior circulation:    Bilateral intracranial vertebral arteries, vertebrobasilar junction, basilar artery and proximal posterior cerebral arteries demonstrate expected flow signal.       MRV:   Bilateral filling defects in sigmoid sinus persisting correspond to recent MRI measuring up to 2.7 cm on the left and 0.8 cm on the right concerning for  "thrombus.   2 cm enhancing left frontal extra-axial lesion compatible with a meningioma better delineated on recent MRI.   MRA:   No evidence of major vessel cutoff or significant stenosis on MRA head and neck.   MACRO: None   Signed by: Liz Espino 10/7/2024 10:06 PM Dictation workstation:   [id number]   Scheduled medications   Medication Dose Route Frequency    ascorbic acid  1,000 mg oral Daily    carisoprodol  350 mg oral Daily    empagliflozin  25 mg oral Daily    glimepiride  4 mg oral BID    insulin lispro  0-5 Units subcutaneous TID    lactobacillus acidophilus  1 tablet oral BID    losartan  100 mg oral Daily    magnesium oxide  400 mg oral Daily    metFORMIN  1,000 mg oral BID    metoprolol succinate XL  100 mg oral Daily    polyethylene glycol  17 g oral Daily    SITagliptin phosphate  100 mg oral Daily     PRN medications   Medication    acetaminophen    Or    acetaminophen    Or    acetaminophen    dextrose    dextrose    glucagon    glucagon    melatonin    ondansetron ODT     Last Recorded Vitals  Blood pressure 180/81, pulse 60, temperature 36.4 °C (97.5 °F), resp. rate 15, height 1.626 m (5' 4\"), weight 109 kg (240 lb 1.3 oz), SpO2 96%.    Relevant Results  Results for orders placed or performed during the hospital encounter of 10/07/24 (from the past 24 hour(s))   CBC and Auto Differential   Result Value Ref Range    WBC 8.3 4.4 - 11.3 x10*3/uL    nRBC 0.0 0.0 - 0.0 /100 WBCs    RBC 5.40 (H) 4.00 - 5.20 x10*6/uL    Hemoglobin 14.8 12.0 - 16.0 g/dL    Hematocrit 46.2 (H) 36.0 - 46.0 %    MCV 86 80 - 100 fL    MCH 27.4 26.0 - 34.0 pg    MCHC 32.0 32.0 - 36.0 g/dL    RDW 14.6 (H) 11.5 - 14.5 %    Platelets 341 150 - 450 x10*3/uL    Neutrophils % 70.2 40.0 - 80.0 %    Immature Granulocytes %, Automated 0.4 0.0 - 0.9 %    Lymphocytes % 19.6 13.0 - 44.0 %    Monocytes % 7.3 2.0 - 10.0 %    Eosinophils % 1.7 0.0 - 6.0 %    Basophils % 0.8 0.0 - 2.0 %    Neutrophils Absolute 5.81 1.20 - 7.70 x10*3/uL "    Immature Granulocytes Absolute, Automated 0.03 0.00 - 0.70 x10*3/uL    Lymphocytes Absolute 1.62 1.20 - 4.80 x10*3/uL    Monocytes Absolute 0.60 0.10 - 1.00 x10*3/uL    Eosinophils Absolute 0.14 0.00 - 0.70 x10*3/uL    Basophils Absolute 0.07 0.00 - 0.10 x10*3/uL   Basic metabolic panel   Result Value Ref Range    Glucose 143 (H) 74 - 99 mg/dL    Sodium 141 136 - 145 mmol/L    Potassium 3.9 3.5 - 5.3 mmol/L    Chloride 104 98 - 107 mmol/L    Bicarbonate 27 21 - 32 mmol/L    Anion Gap 14 10 - 20 mmol/L    Urea Nitrogen 7 6 - 23 mg/dL    Creatinine 0.75 0.50 - 1.05 mg/dL    eGFR 90 >60 mL/min/1.73m*2    Calcium 9.8 8.6 - 10.3 mg/dL   Protime-INR   Result Value Ref Range    Protime 12.5 9.8 - 12.8 seconds    INR 1.1 0.9 - 1.1   aPTT   Result Value Ref Range    aPTT 36 27 - 38 seconds   Lavender Top   Result Value Ref Range    Extra Tube Hold for add-ons.    Gray Top   Result Value Ref Range    Extra Tube Hold for add-ons.    Protein C Activity   Result Value Ref Range    Protein C Activity 130 70 - 150 % activity   Protein S Activity   Result Value Ref Range    Protein S Activity 106 64 - 150 % activity   POCT GLUCOSE   Result Value Ref Range    POCT Glucose 128 (H) 74 - 99 mg/dL   MR venography intracranial w and wo IV contrast    Result Date: 10/7/2024  Interpreted By:  Liz Espino, STUDY: MR VENOGRAPHY INTRACRANIAL W AND WO IV CONTRAST; MR ANGIO HEAD WO IV CONTRAST;  10/7/2024 8:40 pm   INDICATION: Signs/Symptoms:Headache and sent MRI done showed sinus venous thrombosis; Signs/Symptoms:headache.  Stroke protocol.     COMPARISON: MRI brain 10/04/2024   ACCESSION NUMBER(S): [id number]; [id number]   ORDERING CLINICIAN: MOJGAN DAVIS   TECHNIQUE: MRV of the head was performed following administration of 20 mL dotarem. The images were reviewed as source images and maximum intensity projections.   Time-of-flight MRA of the head   was performed. The images were reviewed as source images and maximum intensity  projections.   FINDINGS:   MRV:   Redemonstrated filling defects noted in the bilateral sigmoid sinus. A region of filling defect on the left measures up to 2.7 cm and persists on delayed phase post-contrast enhanced imaging. There is a redemonstrated small persistent, nonocclusive filling defect on the right measuring up to 8 mm.   An eccentric small filling defect in the right straight sinus likely an arachnoid cyst.   The superior sagittal sinus, straight sinus, and deep internal cerebral veins are patent. The transverse sinuses, sigmoid sinuses, and included portions of the internal jugular veins are patent.   Redemonstrated enhancing extra-axial lesion over the left frontal lobe measuring at least 2.1 cm, better evaluated on prior MRI however compatible with an meningioma with mild local mass effect.     MRA of head:   Anterior circulation:    There is expected flow signal in bilateral intracranial internal carotid arteries, bilateral carotid terminals, bilateral proximal anterior and middle cerebral arteries.   Posterior circulation:    Bilateral intracranial vertebral arteries, vertebrobasilar junction, basilar artery and proximal posterior cerebral arteries demonstrate expected flow signal.       MRV:   Bilateral filling defects in sigmoid sinus persisting correspond to recent MRI measuring up to 2.7 cm on the left and 0.8 cm on the right concerning for thrombus.   2 cm enhancing left frontal extra-axial lesion compatible with a meningioma better delineated on recent MRI.   MRA:   No evidence of major vessel cutoff or significant stenosis on MRA head and neck.   MACRO: None   Signed by: Liz Espino 10/7/2024 10:06 PM Dictation workstation:   [id number]    MR angio head wo IV contrast    Result Date: 10/7/2024  Interpreted By:  Liz Espino, STUDY: MR VENOGRAPHY INTRACRANIAL W AND WO IV CONTRAST; MR ANGIO HEAD WO IV CONTRAST;  10/7/2024 8:40 pm   INDICATION: Signs/Symptoms:Headache and sent MRI done  showed sinus venous thrombosis; Signs/Symptoms:headache.  Stroke protocol.     COMPARISON: MRI brain 10/04/2024   ACCESSION NUMBER(S): [id number]; [id number]   ORDERING CLINICIAN: MOJGAN DAVIS   TECHNIQUE: MRV of the head was performed following administration of 20 mL dotarem. The images were reviewed as source images and maximum intensity projections.   Time-of-flight MRA of the head   was performed. The images were reviewed as source images and maximum intensity projections.   FINDINGS:   MRV:   Redemonstrated filling defects noted in the bilateral sigmoid sinus. A region of filling defect on the left measures up to 2.7 cm and persists on delayed phase post-contrast enhanced imaging. There is a redemonstrated small persistent, nonocclusive filling defect on the right measuring up to 8 mm.   An eccentric small filling defect in the right straight sinus likely an arachnoid cyst.   The superior sagittal sinus, straight sinus, and deep internal cerebral veins are patent. The transverse sinuses, sigmoid sinuses, and included portions of the internal jugular veins are patent.   Redemonstrated enhancing extra-axial lesion over the left frontal lobe measuring at least 2.1 cm, better evaluated on prior MRI however compatible with an meningioma with mild local mass effect.     MRA of head:   Anterior circulation:    There is expected flow signal in bilateral intracranial internal carotid arteries, bilateral carotid terminals, bilateral proximal anterior and middle cerebral arteries.   Posterior circulation:    Bilateral intracranial vertebral arteries, vertebrobasilar junction, basilar artery and proximal posterior cerebral arteries demonstrate expected flow signal.       MRV:   Bilateral filling defects in sigmoid sinus persisting correspond to recent MRI measuring up to 2.7 cm on the left and 0.8 cm on the right concerning for thrombus.   2 cm enhancing left frontal extra-axial lesion compatible with a meningioma  better delineated on recent MRI.   MRA:   No evidence of major vessel cutoff or significant stenosis on MRA head and neck.   MACRO: None   Signed by: Liz Espino 10/7/2024 10:06 PM Dictation workstation:   [id number]   Scheduled medications   Medication Dose Route Frequency    ascorbic acid  1,000 mg oral Daily    carisoprodol  350 mg oral Daily    empagliflozin  25 mg oral Daily    glimepiride  4 mg oral BID    insulin lispro  0-5 Units subcutaneous TID    lactobacillus acidophilus  1 tablet oral BID    losartan  100 mg oral Daily    magnesium oxide  400 mg oral Daily    metFORMIN  1,000 mg oral BID    metoprolol succinate XL  100 mg oral Daily    polyethylene glycol  17 g oral Daily    SITagliptin phosphate  100 mg oral Daily     PRN medications   Medication    acetaminophen    Or    acetaminophen    Or    acetaminophen    dextrose    dextrose    glucagon    glucagon    melatonin    ondansetron ODT                       Nik Coma Scale  Best Eye Response: Spontaneous  Best Verbal Response: Oriented  Best Motor Response: Follows commands  Nik Coma Scale Score: 15                 I have personally reviewed the following imaging results MR venography intracranial w and wo IV contrast    Result Date: 10/7/2024  Interpreted By:  Liz Espino, STUDY: MR VENOGRAPHY INTRACRANIAL W AND WO IV CONTRAST; MR ANGIO HEAD WO IV CONTRAST;  10/7/2024 8:40 pm   INDICATION: Signs/Symptoms:Headache and sent MRI done showed sinus venous thrombosis; Signs/Symptoms:headache.  Stroke protocol.     COMPARISON: MRI brain 10/04/2024   ACCESSION NUMBER(S): [id number]; [id number]   ORDERING CLINICIAN: MOJGAN DAVIS   TECHNIQUE: MRV of the head was performed following administration of 20 mL dotarem. The images were reviewed as source images and maximum intensity projections.   Time-of-flight MRA of the head   was performed. The images were reviewed as source images and maximum intensity projections.   FINDINGS:   MRV:    Redemonstrated filling defects noted in the bilateral sigmoid sinus. A region of filling defect on the left measures up to 2.7 cm and persists on delayed phase post-contrast enhanced imaging. There is a redemonstrated small persistent, nonocclusive filling defect on the right measuring up to 8 mm.   An eccentric small filling defect in the right straight sinus likely an arachnoid cyst.   The superior sagittal sinus, straight sinus, and deep internal cerebral veins are patent. The transverse sinuses, sigmoid sinuses, and included portions of the internal jugular veins are patent.   Redemonstrated enhancing extra-axial lesion over the left frontal lobe measuring at least 2.1 cm, better evaluated on prior MRI however compatible with an meningioma with mild local mass effect.     MRA of head:   Anterior circulation:    There is expected flow signal in bilateral intracranial internal carotid arteries, bilateral carotid terminals, bilateral proximal anterior and middle cerebral arteries.   Posterior circulation:    Bilateral intracranial vertebral arteries, vertebrobasilar junction, basilar artery and proximal posterior cerebral arteries demonstrate expected flow signal.       MRV:   Bilateral filling defects in sigmoid sinus persisting correspond to recent MRI measuring up to 2.7 cm on the left and 0.8 cm on the right concerning for thrombus.   2 cm enhancing left frontal extra-axial lesion compatible with a meningioma better delineated on recent MRI.   MRA:   No evidence of major vessel cutoff or significant stenosis on MRA head and neck.   MACRO: None   Signed by: Liz Espino 10/7/2024 10:06 PM Dictation workstation:   [id number]    MR angio head wo IV contrast    Result Date: 10/7/2024  Interpreted By:  Liz Espino, STUDY: MR VENOGRAPHY INTRACRANIAL W AND WO IV CONTRAST; MR ANGIO HEAD WO IV CONTRAST;  10/7/2024 8:40 pm   INDICATION: Signs/Symptoms:Headache and sent MRI done showed sinus venous thrombosis;  Signs/Symptoms:headache.  Stroke protocol.     COMPARISON: MRI brain 10/04/2024   ACCESSION NUMBER(S): [id number]; [id number]   ORDERING CLINICIAN: MOJGAN DAVIS   TECHNIQUE: MRV of the head was performed following administration of 20 mL dotarem. The images were reviewed as source images and maximum intensity projections.   Time-of-flight MRA of the head   was performed. The images were reviewed as source images and maximum intensity projections.   FINDINGS:   MRV:   Redemonstrated filling defects noted in the bilateral sigmoid sinus. A region of filling defect on the left measures up to 2.7 cm and persists on delayed phase post-contrast enhanced imaging. There is a redemonstrated small persistent, nonocclusive filling defect on the right measuring up to 8 mm.   An eccentric small filling defect in the right straight sinus likely an arachnoid cyst.   The superior sagittal sinus, straight sinus, and deep internal cerebral veins are patent. The transverse sinuses, sigmoid sinuses, and included portions of the internal jugular veins are patent.   Redemonstrated enhancing extra-axial lesion over the left frontal lobe measuring at least 2.1 cm, better evaluated on prior MRI however compatible with an meningioma with mild local mass effect.     MRA of head:   Anterior circulation:    There is expected flow signal in bilateral intracranial internal carotid arteries, bilateral carotid terminals, bilateral proximal anterior and middle cerebral arteries.   Posterior circulation:    Bilateral intracranial vertebral arteries, vertebrobasilar junction, basilar artery and proximal posterior cerebral arteries demonstrate expected flow signal.       MRV:   Bilateral filling defects in sigmoid sinus persisting correspond to recent MRI measuring up to 2.7 cm on the left and 0.8 cm on the right concerning for thrombus.   2 cm enhancing left frontal extra-axial lesion compatible with a meningioma better delineated on recent MRI.    MRA:   No evidence of major vessel cutoff or significant stenosis on MRA head and neck.   MACRO: None   Signed by: Liz Espino 10/7/2024 10:06 PM Dictation workstation:   [id number]    MR brain w and wo IV contrast    Result Date: 10/7/2024  Interpreted By:  Leah Chaudhry,  and Dianna Manriquez STUDY: MR BRAIN W AND WO IV CONTRAST;  10/4/2024 7:00 pm   INDICATION: Signs/Symptoms:evaluate for intracranial lesion. positional nausea. 64-year-old female with history of diabetes and newly diagnosed Crohn's disease who presents recurrent nausea. ,R11.2 Nausea with vomiting, unspecified   COMPARISON: MR cervical spine 12/07/2010.   ACCESSION NUMBER(S): [id number]   ORDERING CLINICIAN: REI ARCHULETA   TECHNIQUE: Axial T2, FLAIR, DWI, gradient echo T2 and T1 weighted images of brain were acquired. Post contrast T1 weighted images were acquired after administration of 20 mL Dotarem gadolinium based intravenous contrast.   FINDINGS: CSF Spaces: Incidental note of an extra-axial dural-based enhancing mass measuring 2 x 1.7 x 1.5 cm (AP x CC x TV) along the anterior superior left parasagittal frontal lobe. It demonstrates mass effect with effacement of the adjacent sulci. The ventricles, sulci and basal cisterns are otherwise within normal limits.   Parenchyma: There is no diffusion restriction abnormality to suggest acute infarct.  Mild burden of scattered patchy and confluent T2/FLAIR hyperintensities are noted throughout the periventricular and subcortical white matter, nonspecific, but likely represent chronic small vessel ischemic changes given patient's age.  There is no midline shift.   Paranasal Sinuses and Mastoids: Visualized paranasal sinuses and mastoid air cells are unremarkable.   A short-segment filling defect is noted within the left sigmoid sinus (series 14, image 69) with associated slow flow within the left transverse sinus on FLAIR imaging (series 5, image 11). An additional focal filling defect  is noted within the right sigmoid sinus (series 14, image 65). No definite associated slow flow signal on FLAIR.       1. Filling defect within the left sigmoid sinus with slow flow in the left transverse sinus, suggestive of thrombus. Additional filling defect within the right sigmoid sinus without definitive slow flow in the right transverse sinus, which may represent additional nonocclusive thrombus versus arachnoid granulation. No parenchymal edema or venous infarct. 2. Incidental note of a 2 cm extra-axial dural-based enhancing mass along the left parasagittal frontal with mild local mass effect, likely representing a meningioma. 3. Mild burden of chronic small vessel ischemic changes.   I personally reviewed the images/study and I agree with the findings as stated by Mitra Bustamante MD. This study was interpreted at [facility], [place], [place].   MACRO: Critical Finding:  See findings. Notification was initiated on 10/7/2024 at 3:54 pm by  Leah Chaudhry.  (**-OCF-**)   Signed by: Leah Chaudhry 10/7/2024 3:54 PM Dictation workstation:   [id number]  .      Assessment/Plan   Assessment & Plan  Cerebral venous sinus thrombosis (HHS-HCC)    Type 2 diabetes mellitus without complication, without long-term current use of insulin (Multi)      Impression:  Venous sinus thrombosis with risk factors of inflammatory bowel disease, glucocorticoid use, LT NSAID use, hematologic panel pending  Hx of DM2, HTN  Hx of bleeding with Xarelto  Recommendations:   Continue with fluids, pt. Appears asymptomatic  Can start Eliquis 5mg BID  Heme/Onc consult as oupt.  Please have pt. Discuss with her GI doctor if any medications for Crohn's have put her in a hypercoagulable state              TUCKER Hernandez-CNP    Patient seen and examined.  I believe patient's hypercoagulable state with thrombosis could be due to patient's chronic diarrhea and probably dehydration.  Continue with current  treatment and patient advised to have enough oral hydration and to follow-up with the GI for uncontrollable diarrhea.  Patient also advised to discuss with GI about the medication she is getting whether that could be contributing to patient's hypercoagulable state.  In the meantime continue with Eliquis as started 5 mg twice a day and patient need to follow-up with hematologist as an outpatient    The planned course and the prognosis with the signs symptoms were discussed with patient and if patient has any of the symptoms need to be back in the emergency room and can be put on IV heparin.    Patient was advised to have a follow-up with the neurosurgery for meningioma and the signs symptoms for increased intraocular pressure were discussed with patient which patient understood.    Okay to discharge when medically stable    Due to technical limitations of voice recognition and human error, this note may not accurately reflect the care of the patient.    Clyde Hrenandez MD/Neurology.

## 2024-10-08 NOTE — TELEPHONE ENCOUNTER
Called patient to relay results of MRI brain demonstrating venous thrombosis as well as ?meningioma with mass effect. Patient counseled to present to ED for further evaluation. She is agreeable with plan. Denies any symptoms of headache, blurry vision or lightheadedness. Nausea ongoing.     Melly Winn MD

## 2024-10-08 NOTE — CONSULTS
"Nutrition Initial Assessment:   Nutrition Assessment    Reason for Assessment: Admission nursing screening    Patient is a 64 y.o. female presenting with cerebral venous sinus thrombosis.      Nutrition History:  Energy Intake:  (no meal intakes recorded at this time)  Food and Nutrient History: RDN consult for MST score of 3. Pt reports ~ 35 lb wt loss over the past several months due to newly diagnosed Crohns disease which included symptoms of nausea and vomiting. Pt states UBW prior to wt loss 270 lbs. Pt reports appetite fluctuates, denies following diet restrictions at home. Has been avoiding sweets, chocolate and fried foods to help with Crohn's symptoms. Pt denies GI symptoms at this time. Pt also denies chewing or swallowing difficulty. Pt declined ONS, will monitor po intake. Encouraged increased meal intake as tolerated.  Vitamin/Herbal Supplement Use: alpha lipoic acid, vitamin C, vitamin D + calcium, cinnamon, mag-ox, pomegranite fruit extract, potassium citrate, zinc gluconate per home med list  Food Allergies/Intolerances:   caffeine  GI Symptoms: None  Oral Problems: None       Anthropometrics:  Height: 162.6 cm (5' 4\")   Weight: 109 kg (240 lb 1.3 oz)   BMI (Calculated): 41.19  IBW/kg (Dietitian Calculated): 54.5 kg  Percent of IBW: 200 %       Weight History:   Wt Readings from Last 10 Encounters:   10/07/24 109 kg (240 lb 1.3 oz)   09/11/24 105 kg (231 lb 12.8 oz)   08/26/24 106 kg (234 lb 2.1 oz)   07/18/24 108 kg (238 lb 1.6 oz)   07/02/24 114 kg (252 lb)   06/17/24 115 kg (252 lb 9.6 oz)   01/05/24 115 kg (253 lb 8.5 oz)   12/21/23 118 kg (260 lb 2.3 oz)      Weight Change %:  Weight History / % Weight Change: per chart review, pt with 20 lb, 7.8% nonsignificant wt loss in 10 months  Significant Weight Loss: No    Nutrition Focused Physical Exam Findings:    Subcutaneous Fat Loss:   Orbital Fat Pads: Well nourished (slightly bulging fat pads)  Buccal Fat Pads: Well nourished (full, rounded " cheeks)  Triceps: Well nourished (ample fat tissue)  Muscle Wasting:  Temporalis: Well nourished (well-defined muscle)  Pectoralis (Clavicular Region): Well nourished (clavicle not visible)  Deltoid/Trapezius: Well nourished (rounded appearance at arm, shoulder, neck)  Edema:  Edema: +1 trace  Edema Location: BLE  Physical Findings:  Skin: Negative    Nutrition Significant Labs:  BMP Trend:   Results from last 7 days   Lab Units 10/07/24  1748   GLUCOSE mg/dL 143*   CALCIUM mg/dL 9.8   SODIUM mmol/L 141   POTASSIUM mmol/L 3.9   CO2 mmol/L 27   CHLORIDE mmol/L 104   BUN mg/dL 7   CREATININE mg/dL 0.75    , A1C:  Lab Results   Component Value Date    HGBA1C 7.5 (H) 12/21/2023   , BG POCT trend:   Results from last 7 days   Lab Units 10/08/24  1114   POCT GLUCOSE mg/dL 128*        Nutrition Specific Medications:  Scheduled medications  ascorbic acid, 1,000 mg, oral, Daily  glimepiride, 4 mg, oral, BID  insulin lispro, 0-5 Units, subcutaneous, TID  lactobacillus acidophilus, 1 tablet, oral, BID  magnesium oxide, 400 mg, oral, Daily  metFORMIN, 1,000 mg, oral, BID  polyethylene glycol, 17 g, oral, Daily    I/O:   Last BM Date: 10/08/24;      Dietary Orders (From admission, onward)       Start     Ordered    10/07/24 2334  Adult diet Regular  Diet effective now        Question:  Diet type  Answer:  Regular    10/07/24 2333                     Estimated Needs:   Total Energy Estimated Needs (kCal): 2180 kCal  Method for Estimating Needs: 20 kcal/kg ABW  Total Protein Estimated Needs (g): 109 g  Method for Estimating Needs: 1 g/kg ABW  Total Fluid Estimated Needs (mL): 2180 mL  Method for Estimating Needs: 1 ml/kcal or per MD        Nutrition Diagnosis   Malnutrition Diagnosis  Patient has Malnutrition Diagnosis: No    Nutrition Diagnosis  Patient has Nutrition Diagnosis: Yes  Diagnosis Status (1): New  Nutrition Diagnosis 1: Unintended weight loss  Related to (1): crohn's disease  As Evidenced by (1): 20 lb wt loss over  the past 10 months       Nutrition Interventions/Recommendations         Nutrition Prescription:  Individualized Nutrition Prescription Provided for : Change to 60 g Consistent Carb diet due to hx diabetes and A1c 7.5%. Pt denies need for ONS at this time        Nutrition Interventions:   Interventions: Meals and snacks  Meals and Snacks: Carbohydrate-modified diet, General healthful diet  Goal: Consumes 3 meals per day    Collaboration and Referral of Nutrition Care: Collaboration by nutrition professional with other providers  Goal: secure chat to attending    Nutrition Education:   Pt denies education needs at this time       Nutrition Monitoring and Evaluation   Food/Nutrient Related History Monitoring  Monitoring and Evaluation Plan: Energy intake, Amount of food, Fluid intake  Energy Intake: Estimated energy intake  Criteria: Pt meets >75% of estimated energy needs  Fluid Intake: Estimated fluid intake  Criteria: Meets >75% of estimated fluid needs  Amount of Food: Estimated amout of food  Criteria: Pt consumes >75% of meals    Body Composition/Growth/Weight History  Monitoring and Evaluation Plan: Weight  Weight: Measured weight  Criteria: Maintains stable weight    Biochemical Data, Medical Tests and Procedures  Monitoring and Evaluation Plan: Glucose/endocrine profile, Electrolyte/renal panel  Electrolyte and Renal Panel: Sodium, Potassium, Phosphorus  Criteria: Electrolytes WNL  Glucose/Endocrine Profile: Glucose, casual  Criteria: BG within desirable range              Time Spent (min): 45 minutes

## 2024-10-08 NOTE — PROGRESS NOTES
10/08/24 1125   Discharge Planning   Living Arrangements Alone   Support Systems Family members;Friends/neighbors   Assistance Needed none   Type of Residence Private residence   Number of Stairs to Enter Residence 0   Number of Stairs Within Residence 0   Do you have animals or pets at home? Yes   Type of Animals or Pets cat   Who is requesting discharge planning? Provider   Home or Post Acute Services None   Expected Discharge Disposition Home   Does the patient need discharge transport arranged? No   Patient Choice   Provider Choice list and CMS website (https://medicare.gov/care-compare#search) for post-acute Quality and Resource Measure Data were provided and reviewed with: Patient   Patient / Family choosing to utilize agency / facility established prior to hospitalization No     Patient admitted from home, lives alone is independent with ADL's. Patient plan is to return home if neurology does not feel patient will need transfer to [facility]. CT team will continue to follow for further discharge plans.

## 2024-10-08 NOTE — NURSING NOTE
Eliquis delivered to bedside. Purpose and dosing instructions provided. Patient verbalized understanding.

## 2024-10-09 ENCOUNTER — TELEPHONE (OUTPATIENT)
Dept: ADMISSION | Facility: HOSPITAL | Age: 64
End: 2024-10-09
Payer: COMMERCIAL

## 2024-10-09 LAB — AT III AG ACT/NOR PPP IA: 91 % (ref 82–136)

## 2024-10-09 NOTE — TELEPHONE ENCOUNTER
Patient called and states she was admitted to [facility] 2 days ago and then discharged home yesterday.  She was advised to follow up with Dr. Rivers. She has cerebral venous sinus thrombosis - need for hyper coagulability work up. Placed on Eliquis.      Recently diagnosed with Chrons and had MRI brain on Friday and her GI doctor told her to go to ED d/t clot found on MRI.     After taking one dose of Eliquis - patient states her urine was bright red overnight.  Most recently her urine is also clear with traces of blood.  It has been noted in past exams that she has cyst on kidneys and unsure if this may have caused this.  Advised she needs to follow up with PCP today regarding this.  Patient agreeable and will follow up with PCP.

## 2024-10-09 NOTE — DISCHARGE SUMMARY
Discharge Diagnosis  Cerebral venous sinus thrombosis (HHS-HCC), vomiting, nausea    Issues Requiring Follow-Up  Outpatient follow-up with neurosurgery for history of meningioma and possible side effects    Discharge Meds     Medication List      START taking these medications     Eliquis 5 mg tablet; Generic drug: apixaban; Take 1 tablet (5 mg) by   mouth 2 times a day.     CONTINUE taking these medications     alpha lipoic acid 300 mg capsule   ammonium lactate 12 % lotion; Commonly known as: Lac-Hydrin   ascorbic acid 1,000 mg tablet; Commonly known as: Vitamin C   calcium citrate-vitamin D3 200 mg-3.125 mcg (125 unit) tablet   CHROMIUM PICOLINATE ORAL   cinnamon 500 mg capsule   glimepiride 4 mg tablet; Commonly known as: Amaryl   irbesartan 300 mg tablet; Commonly known as: Avapro   Januvia 100 mg tablet; Generic drug: SITagliptin phosphate   Jardiance 25 mg; Generic drug: empagliflozin   magnesium oxide 400 mg magnesium capsule   metFORMIN 1,000 mg tablet; Commonly known as: Glucophage   metoprolol succinate  mg 24 hr capsule; Commonly known as:   Kapspargo Sprinkle   multivitamin tablet   ondansetron ODT 8 mg disintegrating tablet; Commonly known as:   Zofran-ODT; Take 1 tablet (8 mg) by mouth every 8 hours if needed for   nausea or vomiting.   OneTouch Delica Plus Lancet 30 gauge misc; Generic drug: lancets   OneTouch Verio test strips strip; Generic drug: blood sugar diagnostic   pomegranate fruit extract 250 mg capsule   potassium citrate 99 mg capsule   risankizumab-rzaa 180 mg/1.2 mL (150 mg/mL) wearable injector injection;   Commonly known as: Skyrizi; Inject 1.2 mL (180 mg) under the skin every 8   (eight) weeks.   saccharomyces boulardii 250 mg capsule; Commonly known as: Florastor   Soma 350 mg tablet; Generic drug: carisoprodol   zinc gluconate 50 mg tablet       Test Results Pending At Discharge  Pending Labs       Order Current Status    Factor V Leiden In process    JAK2 V617F Mutation by  PCR, Qual In process            Hospital Course  Savi Govea is a 64 y.o. female with a PMH of DM 2, HTN, Crohn's disease presenting with nausea/vomiting.  Patient was recently diagnosed with Crohn's disease.  Her gastroenterologist ordered an MRI of her brain due to persistent nausea and vomiting.  The MRI showed concern for venous sinus thrombosis and patient was told to come to the ER.      #.  Cerebral venous sinus thrombosis  -Patient presented with nausea and vomiting, MRV showed bilateral filling defects in sigmoid sinus  -Neurology was consulted in [facility] on arrival and recommended IV fluid hydration.  Patient was again seen by neurologist who recommended to start anticoagulation.  Regarding meningioma they advised patient to follow-up as outpatient with a neurosurgeon for further evaluation.  -Antiemetic as needed, pain management, was considered.     #.  Type 2 diabetes  -SSI, Accu-Cheks, hypoglycemic protocol, was placed  -Continued home Januvia     #.  HTN  -Continued home medications     VTE PPX: Was with SCDs however later per neurology recommendation patient was started on Eliquis.      Currently patient is hemodynamically stable and ready for discharge home on Eliquis and rest of her home medication.  She will follow as outpatient with neurology/neurosurgery for history of cerebral venous sinus thrombosis and meningioma.  She will also follow-up with her primary care as needed.    Pertinent Physical Exam At Time of Discharge  Physical Exam  General: Well-developed obese female, in no acute distress  HEENT: AT, NC, no JVD, no lymphadenopathy, neck supple  Lungs: Clear, no wheezing, no crackles  Cardiac: Normal S1-S2, no murmur, no gallop  Abdomen: Soft, nontender, no distention, positive bowel sound  Extremities: No deformity, no edema, pulses intact, ROM intact  Neurological: Alert awake oriented x3, sensation intact, clear speech      Outpatient Follow-Up  Future Appointments   Date Time  Provider Department [place]   10/22/2024  5:00 PM INF 10 [id number] [id number] [place]   12/11/2024  1:30 PM Melly Winn MD [id number] [place]       Time spent 35 minutes  Erica Lloyd MD

## 2024-10-10 NOTE — TELEPHONE ENCOUNTER
RN spoke with patient. She did follow up with PCP yesterday and was advised to cut Eliquis in half and only take half dose 2x day.  She will continue to follow up with her PCP with any side effects and management.   Also per ED notes - patient should follow up with neurology.  Phone number given to patient for scheduling to schedule with provider close to home.   Patient agreeable with plan and will call back if she has additional questions.

## 2024-10-14 LAB
ELECTRONICALLY SIGNED BY: ABNORMAL
ELECTRONICALLY SIGNED BY: NORMAL
FACTOR V LEIDEN INTERPRETATION: ABNORMAL
FACTOR V LEIDEN RESULT: ABNORMAL
JAK2 V617F INTERPRETATION: NORMAL
JAK2 V617F RESULT: NOT DETECTED

## 2024-10-22 ENCOUNTER — APPOINTMENT (OUTPATIENT)
Dept: INFUSION THERAPY | Facility: CLINIC | Age: 64
End: 2024-10-22
Payer: COMMERCIAL

## 2024-10-22 DIAGNOSIS — K50.119 CROHN'S DISEASE OF COLON WITH COMPLICATION (MULTI): ICD-10-CM

## 2024-10-22 PROCEDURE — 96365 THER/PROPH/DIAG IV INF INIT: CPT | Performed by: NURSE PRACTITIONER

## 2024-10-22 RX ORDER — ALBUTEROL SULFATE 0.83 MG/ML
3 SOLUTION RESPIRATORY (INHALATION) AS NEEDED
Status: CANCELLED | OUTPATIENT
Start: 2024-11-18

## 2024-10-22 RX ORDER — FAMOTIDINE 10 MG/ML
20 INJECTION INTRAVENOUS ONCE AS NEEDED
Status: CANCELLED | OUTPATIENT
Start: 2024-11-18

## 2024-10-22 RX ORDER — ACETAMINOPHEN 325 MG/1
650 TABLET ORAL ONCE
Status: COMPLETED | OUTPATIENT
Start: 2024-10-22 | End: 2024-10-22

## 2024-10-22 RX ORDER — ACETAMINOPHEN 325 MG/1
650 TABLET ORAL ONCE
Status: CANCELLED | OUTPATIENT
Start: 2024-11-18

## 2024-10-22 RX ORDER — EPINEPHRINE 0.3 MG/.3ML
0.3 INJECTION SUBCUTANEOUS EVERY 5 MIN PRN
Status: CANCELLED | OUTPATIENT
Start: 2024-11-18

## 2024-10-22 RX ORDER — DIPHENHYDRAMINE HYDROCHLORIDE 50 MG/ML
50 INJECTION INTRAMUSCULAR; INTRAVENOUS AS NEEDED
Status: CANCELLED | OUTPATIENT
Start: 2024-11-18

## 2024-10-22 ASSESSMENT — ENCOUNTER SYMPTOMS
CONSTITUTIONAL NEGATIVE: 1
ENDOCRINE NEGATIVE: 1
CARDIOVASCULAR NEGATIVE: 1
NEUROLOGICAL NEGATIVE: 1
RESPIRATORY NEGATIVE: 1
MUSCULOSKELETAL NEGATIVE: 1
GASTROINTESTINAL NEGATIVE: 1
EYES NEGATIVE: 1
HEMATOLOGIC/LYMPHATIC NEGATIVE: 1

## 2024-10-22 NOTE — PATIENT INSTRUCTIONS
Today :We administered acetaminophen and risankizumab-rzaa (Skyrizi) 600 mg in dextrose 5% 260 mL IV.     For:   1. Crohn's disease of colon with complication (Multi)         Your next appointment is due in:  na        Please read the  Medication Guide that was given to you and reviewed during todays visit.     (Tell all doctors including dentists that you are taking this medication)     Go to the emergency room or call 911 if:  -You have signs of allergic reaction:   -Rash, hives, itching.   -Swollen, blistered, peeling skin.   -Swelling of face, lips, mouth, tongue or throat.   -Tightness of chest, trouble breathing, swallowing or talking     Call your doctor:  - If IV / injection site gets red, warm, swollen, itchy or leaks fluid or pus.     (Leave dressing on your IV site for at least 2 hours and keep area clean and dry  - If you get sick or have symptoms of infection or are not feeling well for any reason.    (Wash your hands often, stay away from people who are sick)  - If you have side effects from your medication that do not go away or are bothersome.     (Refer to the teaching your nurse gave you for side effects to call your doctor about)    - Common side effects may include:  stuffy nose, headache, feeling tired, muscle aches, upset stomach  - Before receiving any vaccines     - Call the [facility] at   If:  - You get sick, are on antibiotics, have had a recent vaccine, have surgery or dental work and your doctor wants your visit rescheduled.  - You need to cancel and reschedule your visit for any reason. Call at least 2 days before your visit if you need to cancel.   - Your insurance changes before your next visit.    (We will need to get approval from your new insurance. This can take up to two weeks.)     The [facility] is opened Monday thru Friday. We are closed on weekends and holidays.   Voice mail will take your call if the center is closed. If you leave a message  please allow 24 hours for a call back during weekdays. If you leave a message on a weekend/holiday, we will call you back the next business day.

## 2024-10-22 NOTE — PROGRESS NOTES
[facility]   Infusion Clinic Note   Date: 2024   Name: Savi Govea  : 1960   MRN: [id number]          Reason for Visit: No chief complaint on file.         Today: Savi Govea had no medications administered during this visit.       Visit Type: INFUSION              Accompanied by:Self       Diagnosis: Crohn's disease of colon with complication (Multi)        Allergies:   Allergies as of 10/22/2024 - Reviewed 10/07/2024   Allergen Reaction Noted    Aspirin GI Upset 10/08/2023    Caffeine Unknown 2024    Cefditoren Unknown 2012    Cetirizine Other 2012    Fexofenadine Other 2012    Phenylephrine-guaifenesin Other 2012    Pseudoephedrine-guaifenesin Unknown 10/08/2023    Salicylates Other 2012    Spectracef [cefditoren pivoxil] Unknown 10/08/2023          Current Medications has a current medication list which includes the following prescription(s): alpha lipoic acid, ammonium lactate, apixaban, ascorbic acid, calcium citrate-vitamin d3, chromium picolinate, cinnamon, glimepiride, irbesartan, januvia, jardiance, magnesium oxide, metformin, metoprolol succinate xl, multivitamin, ondansetron odt, onetouch delica plus lancet, onetouch verio test strips, pomegranate fruit extract, potassium citrate, risankizumab-rzaa, saccharomyces boulardii, soma, and zinc gluconate, and the following Facility-Administered Medications: acetaminophen and risankizumab-rzaa (Skyrizi) 600 mg in dextrose 5% 260 mL IV.       Vitals:   There were no vitals filed for this visit.          Infusion Pre-procedure Checklist:   - Allergies reviewed: yes   - Medications reviewed: yes       - Previous reaction to current treatment: no      Assess patient for the concerns below. Document provider notification as appropriate.  - Active or recent infection with/without current antibiotic use: no  - Recent or planned invasive dental work: no  - Recent or planned  "surgeries: no  - Recently received or plans to receive vaccinations: no  - Has treatment related toxicities: no  - Is pregnant:  no      Pain: 0   - Is the pain different from normal: no   - Is your Doctor aware:  n/a       Labs: N/A          Fall Risk Screening:         Review Of Systems:  Review of Systems   Constitutional: Negative.    HENT:  Negative.     Eyes: Negative.    Respiratory: Negative.     Cardiovascular: Negative.    Gastrointestinal: Negative.    Endocrine: Negative.    Genitourinary: Negative.     Musculoskeletal: Negative.    Skin: Negative.    Neurological: Negative.    Hematological: Negative.    Psychiatric/Behavioral:          Na         ROS completed? Yes      Infusion Readiness:  - Assessment Concerns Related to Infusion: No  - Provider notified: n/a      Document Below Only If Indicated:   New Patient Education:    N/A (returning patient for continuation of therapy. Ongoing education provided as needed.)        Treatment Conditions & Drug Specific Questions:    Risankizumab  (SKYRIZI)   (Unless otherwise specified on patient specific therapy plan):     TREATMENT CONDITIONS:   Unless otherwise specified on patient specific therapy plan HOLD and notify provider prior to proceeding with treatment if:   o Positive Hepatitis B Surface Ag  o Positive T-Spot  o Signs or symptoms of hepatotoxicity  o Elevated AST / ALT  o Total Bilirubin GREATER THAN 2.2 TIMES ULN  - Positive Pregnancy    Lab Results   Component Value Date    HEPBSAG Nonreactive 08/02/2024      Lab Results   Component Value Date    TBSIN Negative 08/02/2024      No results found for: \"NONUHFIRE\", \"NONUHSWGH\", \"NONUHFISH\", \"EXTHEPBSAG\"    Chemistry    Lab Results   Component Value Date/Time     10/07/2024 1748    K 3.9 10/07/2024 1748     10/07/2024 1748    CO2 27 10/07/2024 1748    BUN 7 10/07/2024 1748    CREATININE 0.75 10/07/2024 1748    Lab Results   Component Value Date/Time    CALCIUM 9.8 10/07/2024 1748    " ALKPHOS 79 07/02/2024 1219    AST 16 07/02/2024 1219    ALT 21 07/02/2024 1219    BILITOT 0.5 07/02/2024 1219          Lab Results   Component Value Date    ALT 21 07/02/2024    AST 16 07/02/2024    ALKPHOS 79 07/02/2024    BILITOT 0.5 07/02/2024        Labs reviewed and patient meets treatment conditions? Yes    REMINDER:  *Negative pregnancy test prior to first infusion in patients of childbearing ability.     Infusion 1: Provide patient with [email or web address] patient education sheet and review. Assist pt in connecting with nurse navigator if not already connected (pt to call [phone]).    Infusion 2: Assure patient is established with nurse navigator in the community. Have patient watch [employer] instructional video. ([email or web address])    Infusion 3: Complete on body injection training. Instruct patient on timing of first self injection. Typically due 4 weeks from 3rd infusion and then every 8 weeks thereafter (1st injection due 12 weeks from week 0 infusion). Refer to individual script.          - Completed? Yes. Week 3 training complete     Recommended Vitals/Observation:  Vitals: Obtain vital signs at start and end of infusion.   Observation: Patient may leave immediately after        Weight Based Drug Calculations:    WEIGHT BASED DRUGS: NOT APPLICABLE / FLAT DOSE          Initiated By: Linda Kong RN

## 2024-10-30 ENCOUNTER — APPOINTMENT (OUTPATIENT)
Dept: HEMATOLOGY/ONCOLOGY | Facility: CLINIC | Age: 64
End: 2024-10-30
Payer: COMMERCIAL

## 2024-10-30 ENCOUNTER — OFFICE VISIT (OUTPATIENT)
Dept: HEMATOLOGY/ONCOLOGY | Facility: CLINIC | Age: 64
End: 2024-10-30
Payer: COMMERCIAL

## 2024-10-30 ENCOUNTER — HOSPITAL ENCOUNTER (OUTPATIENT)
Dept: RADIOLOGY | Facility: HOSPITAL | Age: 64
Discharge: HOME | End: 2024-10-30
Payer: COMMERCIAL

## 2024-10-30 VITALS
TEMPERATURE: 97.3 F | WEIGHT: 235.23 LBS | OXYGEN SATURATION: 93 % | RESPIRATION RATE: 18 BRPM | HEART RATE: 71 BPM | SYSTOLIC BLOOD PRESSURE: 169 MMHG | BODY MASS INDEX: 40.38 KG/M2 | DIASTOLIC BLOOD PRESSURE: 75 MMHG

## 2024-10-30 DIAGNOSIS — I10 ESSENTIAL HYPERTENSION: ICD-10-CM

## 2024-10-30 DIAGNOSIS — R31.9 HEMATURIA, UNSPECIFIED TYPE: ICD-10-CM

## 2024-10-30 DIAGNOSIS — E11.9 TYPE 2 DIABETES MELLITUS WITHOUT COMPLICATION, WITHOUT LONG-TERM CURRENT USE OF INSULIN (MULTI): ICD-10-CM

## 2024-10-30 DIAGNOSIS — K50.119 CROHN'S DISEASE OF COLON WITH COMPLICATION (MULTI): ICD-10-CM

## 2024-10-30 DIAGNOSIS — G08 CEREBRAL VENOUS SINUS THROMBOSIS (HHS-HCC): Primary | ICD-10-CM

## 2024-10-30 DIAGNOSIS — D32.9 MENINGIOMA (MULTI): ICD-10-CM

## 2024-10-30 DIAGNOSIS — G08 CEREBRAL VENOUS SINUS THROMBOSIS (HHS-HCC): ICD-10-CM

## 2024-10-30 PROCEDURE — 99214 OFFICE O/P EST MOD 30 MIN: CPT | Performed by: INTERNAL MEDICINE

## 2024-10-30 PROCEDURE — 70553 MRI BRAIN STEM W/O & W/DYE: CPT

## 2024-10-30 PROCEDURE — 4010F ACE/ARB THERAPY RXD/TAKEN: CPT | Performed by: INTERNAL MEDICINE

## 2024-10-30 PROCEDURE — 99204 OFFICE O/P NEW MOD 45 MIN: CPT | Performed by: INTERNAL MEDICINE

## 2024-10-30 PROCEDURE — 2550000001 HC RX 255 CONTRASTS: Performed by: INTERNAL MEDICINE

## 2024-10-30 PROCEDURE — 3077F SYST BP >= 140 MM HG: CPT | Performed by: INTERNAL MEDICINE

## 2024-10-30 PROCEDURE — 3078F DIAST BP <80 MM HG: CPT | Performed by: INTERNAL MEDICINE

## 2024-10-30 PROCEDURE — A9575 INJ GADOTERATE MEGLUMI 0.1ML: HCPCS | Performed by: INTERNAL MEDICINE

## 2024-10-30 RX ORDER — GADOTERATE MEGLUMINE 376.9 MG/ML
0.2 INJECTION INTRAVENOUS
Status: COMPLETED | OUTPATIENT
Start: 2024-10-30 | End: 2024-10-30

## 2024-10-30 ASSESSMENT — PAIN SCALES - GENERAL: PAINLEVEL_OUTOF10: 2

## 2024-10-31 ENCOUNTER — APPOINTMENT (OUTPATIENT)
Dept: INFUSION THERAPY | Facility: CLINIC | Age: 64
End: 2024-10-31
Payer: COMMERCIAL

## 2024-10-31 PROBLEM — R31.9 HEMATURIA: Status: ACTIVE | Noted: 2024-10-31

## 2024-10-31 PROBLEM — D32.9 MENINGIOMA (MULTI): Status: ACTIVE | Noted: 2024-10-31

## 2024-10-31 ASSESSMENT — ENCOUNTER SYMPTOMS
CONSTITUTIONAL NEGATIVE: 1
RESPIRATORY NEGATIVE: 1
EYES NEGATIVE: 1
HEMATURIA: 1
CARDIOVASCULAR NEGATIVE: 1
PSYCHIATRIC NEGATIVE: 1
NAUSEA: 1
HEADACHES: 1
HEMATOLOGIC/LYMPHATIC NEGATIVE: 1
ENDOCRINE NEGATIVE: 1
MUSCULOSKELETAL NEGATIVE: 1

## 2024-11-01 ENCOUNTER — TELEMEDICINE (OUTPATIENT)
Dept: HEMATOLOGY/ONCOLOGY | Facility: CLINIC | Age: 64
End: 2024-11-01
Payer: COMMERCIAL

## 2024-11-01 ENCOUNTER — OFFICE VISIT (OUTPATIENT)
Dept: UROLOGY | Facility: CLINIC | Age: 64
End: 2024-11-01
Payer: COMMERCIAL

## 2024-11-01 VITALS
HEIGHT: 64 IN | DIASTOLIC BLOOD PRESSURE: 84 MMHG | WEIGHT: 236.6 LBS | SYSTOLIC BLOOD PRESSURE: 156 MMHG | HEART RATE: 67 BPM | BODY MASS INDEX: 40.39 KG/M2

## 2024-11-01 DIAGNOSIS — D32.9 MENINGIOMA (MULTI): ICD-10-CM

## 2024-11-01 DIAGNOSIS — E11.9 TYPE 2 DIABETES MELLITUS WITHOUT COMPLICATION, WITHOUT LONG-TERM CURRENT USE OF INSULIN (MULTI): ICD-10-CM

## 2024-11-01 DIAGNOSIS — R31.9 HEMATURIA, UNSPECIFIED TYPE: ICD-10-CM

## 2024-11-01 DIAGNOSIS — G08 CEREBRAL VENOUS SINUS THROMBOSIS (HHS-HCC): Primary | ICD-10-CM

## 2024-11-01 DIAGNOSIS — D68.51 FACTOR V LEIDEN CARRIER (MULTI): ICD-10-CM

## 2024-11-01 DIAGNOSIS — K50.119 CROHN'S DISEASE OF COLON WITH COMPLICATION (MULTI): ICD-10-CM

## 2024-11-01 DIAGNOSIS — I10 ESSENTIAL HYPERTENSION: ICD-10-CM

## 2024-11-01 LAB
POC APPEARANCE, URINE: ABNORMAL
POC BILIRUBIN, URINE: NEGATIVE
POC BLOOD, URINE: ABNORMAL
POC COLOR, URINE: ABNORMAL
POC GLUCOSE, URINE: ABNORMAL MG/DL
POC KETONES, URINE: NEGATIVE MG/DL
POC LEUKOCYTES, URINE: NEGATIVE
POC NITRITE,URINE: NEGATIVE
POC PH, URINE: 5.5 PH
POC PROTEIN, URINE: ABNORMAL MG/DL
POC SPECIFIC GRAVITY, URINE: 1.01
POC UROBILINOGEN, URINE: 0.2 EU/DL

## 2024-11-01 PROCEDURE — 3008F BODY MASS INDEX DOCD: CPT | Performed by: UROLOGY

## 2024-11-01 PROCEDURE — 99203 OFFICE O/P NEW LOW 30 MIN: CPT | Performed by: UROLOGY

## 2024-11-01 PROCEDURE — 3077F SYST BP >= 140 MM HG: CPT | Performed by: UROLOGY

## 2024-11-01 PROCEDURE — 99442 PR PHYS/QHP TELEPHONE EVALUATION 11-20 MIN: CPT | Performed by: INTERNAL MEDICINE

## 2024-11-01 PROCEDURE — 81003 URINALYSIS AUTO W/O SCOPE: CPT | Performed by: UROLOGY

## 2024-11-01 PROCEDURE — 4010F ACE/ARB THERAPY RXD/TAKEN: CPT | Performed by: INTERNAL MEDICINE

## 2024-11-01 PROCEDURE — 4010F ACE/ARB THERAPY RXD/TAKEN: CPT | Performed by: UROLOGY

## 2024-11-01 PROCEDURE — 3079F DIAST BP 80-89 MM HG: CPT | Performed by: UROLOGY

## 2024-11-01 RX ORDER — PANTOPRAZOLE SODIUM 40 MG/1
TABLET, DELAYED RELEASE ORAL
COMMUNITY
Start: 2024-05-10

## 2024-11-02 PROBLEM — D68.51 FACTOR V LEIDEN CARRIER (MULTI): Status: ACTIVE | Noted: 2024-11-02

## 2024-11-02 ASSESSMENT — ENCOUNTER SYMPTOMS
RESPIRATORY NEGATIVE: 1
ENDOCRINE NEGATIVE: 1
EYES NEGATIVE: 1
HEMATOLOGIC/LYMPHATIC NEGATIVE: 1
HEADACHES: 1
CARDIOVASCULAR NEGATIVE: 1
MUSCULOSKELETAL NEGATIVE: 1
HEMATURIA: 1
PSYCHIATRIC NEGATIVE: 1
CONSTITUTIONAL NEGATIVE: 1
GASTROINTESTINAL NEGATIVE: 1

## 2024-11-04 ENCOUNTER — OFFICE VISIT (OUTPATIENT)
Dept: NEUROLOGY | Facility: CLINIC | Age: 64
End: 2024-11-04
Payer: COMMERCIAL

## 2024-11-04 VITALS
HEIGHT: 64 IN | BODY MASS INDEX: 40.12 KG/M2 | WEIGHT: 235 LBS | HEART RATE: 68 BPM | DIASTOLIC BLOOD PRESSURE: 82 MMHG | SYSTOLIC BLOOD PRESSURE: 150 MMHG

## 2024-11-04 DIAGNOSIS — D32.9 MENINGIOMA (MULTI): ICD-10-CM

## 2024-11-04 DIAGNOSIS — G08 CEREBRAL VENOUS SINUS THROMBOSIS (HHS-HCC): Primary | ICD-10-CM

## 2024-11-04 PROCEDURE — 99215 OFFICE O/P EST HI 40 MIN: CPT | Mod: GC | Performed by: PSYCHIATRY & NEUROLOGY

## 2024-11-04 PROCEDURE — 3008F BODY MASS INDEX DOCD: CPT | Performed by: PSYCHIATRY & NEUROLOGY

## 2024-11-04 PROCEDURE — 3077F SYST BP >= 140 MM HG: CPT | Performed by: PSYCHIATRY & NEUROLOGY

## 2024-11-04 PROCEDURE — 3079F DIAST BP 80-89 MM HG: CPT | Performed by: PSYCHIATRY & NEUROLOGY

## 2024-11-04 PROCEDURE — 4010F ACE/ARB THERAPY RXD/TAKEN: CPT | Performed by: PSYCHIATRY & NEUROLOGY

## 2024-11-04 PROCEDURE — 99205 OFFICE O/P NEW HI 60 MIN: CPT | Performed by: PSYCHIATRY & NEUROLOGY

## 2024-11-04 PROCEDURE — 1036F TOBACCO NON-USER: CPT | Performed by: PSYCHIATRY & NEUROLOGY

## 2024-11-04 ASSESSMENT — ACTIVITIES OF DAILY LIVING (ADL)
STAIRS: INDEPENDENT
GROOMING: INDEPENDENT FACE/HAIR/TEETH.SHAVING (IMPLEMENTS PROVIDED)
MOBILITY_LEVEL_SURFACES: INDEPENDENT (BUT MAY USE ANY AID FOR EXAMPLE, STICK) > 50 YARDS
BLADDER: CONTINENT
TOTAL_SCORE: 100
DRESSING: INDEPENDENT (INCLUDING BUTTONS, ZIPS, LACES,ETC.)
BOWELS: INDEPENDENT (INCLUDING BUTTONS, ZIPS, LACES, ETC.)
BED_TO_CHAIR_AND_BACK: INDEPENDENT
BATHING: INDEPENDENT (OR IN SHOWER)
FEEDING: INDEPENDENT
TOILET_USE: INDEPENDENT (ON AND OFF, DRESSING, WIPING)

## 2024-11-04 NOTE — PROGRESS NOTES
[facility]   Savi Govea is a 64 y.o. year old female presenting for initial evaluation .   Referred by: No ref. provider found  PCP: Mary Cortez DO    Savi Govea is a 65 yo F with PMHx HTN, HLD, T2DM, chrohns disease, factor V leiden heterozygote carrier, meningioma, L cervical radiculopathy, lumbar radiculopathy, osteoarthritis, and bilateral cerebral venous sinus thrombosis who presents today for evaluation regarding cerebral venous sinus thrombosis. She was experiencing persistent nausea and vomiting and MRI brain revealed sigmoid sinus filling defects bilaterally. She was started on Eliquis with recommendation to followup with neurosurgery outpatient. After starting Eliquis, she experienced hematuria, which urology believes to be 2/2 kidney stones.     She is currently taking 1.25 mg Eliquis twice per day due to ongoing hematuria. She has a followup visit in 3 weeks with urology regarding management of kidney stones.     Nausea is improved but ongoing. Just started 3 loading doses of Skyrizi. She reports some increase in headaches, which are generalized, aching, 2/10 in severity. No aura. No vision changes. No photophobia or phonophobia. Improves with lying down. She does not have a headache currently.     She denies any neurologic changes including weakness, paresthesia or cooShe has chronic neuropathy in lower extermities, L>R. She has intermittent numbness in L hand, once a week, in the evening.     Denies hormone therapies in the past several months. No tobacco products. No alcohol use in a year. No other substance use.     Sleep is good quality. No snoring. No history of sleep study. Energy is poor.     Extensive review of notes in EMR, labs, tests, Interpretation of neuroimaging  Factor V leiden heterozygote  Normal protein C, S, antithrombin III  JAK2 V617F not detected    Hba1c 7.5 12/23  Lipid panel outstanding     No CT head results  found for the past 12 months  MR brain w and wo IV contrast    Result Date: 10/30/2024  1. There are again intraluminal filling defects within the left greater than right sigmoid sinuses suggestive of partially occlusive thrombus on the left and either prominent arachnoid granulation or additional thrombus on the right. Overall this is very similar in appearance from the previous exam. 2. Redemonstration of enhancing dural-based mass along the parasagittal left frontal lobe, unchanged from the previous exam. 3. Redemonstration of mild white matter changes which is nonspecific but may represent small-vessel ischemic disease in a patient of this age.       MACRO: None   Signed by: Jessica Slater 10/30/2024 5:08 PM Dictation workstation:   [id number]    MR brain w and wo IV contrast    Result Date: 10/7/2024  1. Filling defect within the left sigmoid sinus with slow flow in the left transverse sinus, suggestive of thrombus. Additional filling defect within the right sigmoid sinus without definitive slow flow in the right transverse sinus, which may represent additional nonocclusive thrombus versus arachnoid granulation. No parenchymal edema or venous infarct. 2. Incidental note of a 2 cm extra-axial dural-based enhancing mass along the left parasagittal frontal with mild local mass effect, likely representing a meningioma. 3. Mild burden of chronic small vessel ischemic changes.   I personally reviewed the images/study and I agree with the findings as stated by Mitra Bustamante MD. This study was interpreted at [place], [place].   MACRO: Critical Finding:  See findings. Notification was initiated on 10/7/2024 at 3:54 pm by  Leah Chaudhry.  (**-OCF-**)   Signed by: Leah Chaudhry 10/7/2024 3:54 PM Dictation workstation:   [id number]   Encounter Date: 07/02/24   ECG 12 lead   Result Value    Ventricular Rate 77    Atrial Rate 77    MN Interval 158    QRS Duration 82    QT  "Interval 404    QTC Calculation(Bazett) 457    P Axis 40    R Axis -21    T Axis 22    QRS Count 12    Q Onset 215    P Onset 136    P Offset 197    T Offset 417    QTC Fredericia 439     No echocardiogram results found for the past 12 months     No results found for: \"CHOL\", \"TRIG\", \"HDL\", \"CHHDL\", \"LDLF\", \"VLDL\", \"NHDL\"  Lab Results   Component Value Date    HGBA1C 7.5 (H) 12/21/2023     Lab Results   Component Value Date    GLUCOSE 143 (H) 10/07/2024     10/07/2024    K 3.9 10/07/2024     10/07/2024    CO2 27 10/07/2024    ANIONGAP 14 10/07/2024    BUN 7 10/07/2024    CREATININE 0.75 10/07/2024    GFRMALE CANCELED 08/06/2023    CALCIUM 9.8 10/07/2024    ALBUMIN 4.1 07/02/2024     Lab Results   Component Value Date    CALCIUM 9.8 10/07/2024    PROT 7.1 07/02/2024    ALBUMIN 4.1 07/02/2024    AST 16 07/02/2024    ALT 21 07/02/2024    ALKPHOS 79 07/02/2024    BILITOT 0.5 07/02/2024     Lab Results   Component Value Date    WBC 8.3 10/07/2024    HGB 14.8 10/07/2024    HCT 46.2 (H) 10/07/2024    MCV 86 10/07/2024     10/07/2024     INR   Date Value Ref Range Status   10/07/2024 1.1 0.9 - 1.1 Final   No results found for: \"TSH\"    Relevant ROS, Problem list, Past Medical/ Surgical/ Family/ Social history- reviewed and pertinent details noted in history.     Objective     Visit Vitals  /82   Pulse 68   Ht 1.626 m (5' 4\")   Wt 107 kg (235 lb)   BMI 40.34 kg/m²   OB Status Postmenopausal   Smoking Status Former   BSA 2.2 m²       Neuro Scores/ Scales:   Modified Dar[place] (mRS) Modified Dar[place] Score: No symptoms.   Barthel Index  Feeding: independent  Bathing: independent (or in shower)  Grooming: independent face/hair/teeth.shaving (implements provided)  Dressing : independent (including buttons, zips, laces,etc.)  Bowels: independent (including buttons, zips, laces, etc.)  Bladder: continent  Toilet Use : independent (on and off, dressing, wiping)  Transfers (Bed to chair and back) : " independent  Mobility (On level surfaces): independent (but may use any aid for example, stick) > 50 yards  Stairs: independent  Total (0-100): 100   NIHSS: NIH Stroke Scale 1A. Level of Consciousness: Alert, Keenly Responsive, 1B. Ask Month and Age: Both Questions Right, 1C. Blink Eyes & Squeeze Hands: Performs Both Tasks, 2. Best Gaze: Normal, 3. Visual: No Visual Loss, 4. Facial Palsy: Normal Symmetrical Movements, 5A. Motor - Left Arm: No Drift, 5B. Motor - Right Arm: No Drift, 6A. Motor - Left Leg: No Drift, 6B. Motor - Right Leg: No Drift, 7. Limb Ataxia: Absent, 8. Sensory Loss: Normal, 9. Best Language: No Aphasia, 10. Dysarthria: Normal, 11. Extinction and Inattention: No Abnormality, NIH Stroke Scale: 0       Assessment/Plan   1. Cerebral venous sinus thrombosis (HHS-HCC)    2. Meningioma (Multi)        Savi Govea is a 63 yo F with PMHx HTN, HLD, T2DM, chrohns disease, factor V leiden heterozygote , meningioma, L cervical radiculopathy, lumbar radiculopathy, osteoarthritis, and bilateral cerebral venous sinus thrombosis who presents today for evaluation regarding cerebral venous sinus thrombosis. She was experiencing persistent nausea and vomiting which prompted her gastroenterologist to order an MRI brain, which revealed sigmoid sinus filling defects bilaterally, as well as a meningioma. Neurological examination is unrevealing and she denies any focal neurologic symptoms associated with this. Recommend hydration, Eliquis 5 mg BID, and repeat imaging in 3 months, as well as followup with neurosurgery regarding meningioma. Due to hematuria (determined by urology to be 2/2 kidney stones), she had decreased Eliquis to subtherapeutic dosing. Blood counts have been stable. She will followup with urology in 3 weeks reagrding management of kidney stones. We provided education about this.     PLAN   -recommend Eliquis 5 mg BID   -followup with neurosurgery regarding meningioma   -repeat MRV with IV contrast  "in January   -followup with us after repeat scan   -continue to stay well hydrated     Goals for STROKE PREVENTION- recommendations to reduce the risk of vascular events and promote brain health include monitoring and management of vascular risk factors and lifestyle choices to goal values.     Cardiovascular disease- Goal is monitoring and management of conditions that increase stroke risk.    Antithrombotic \"blood thinner\" medication- Antithrombic Therapy/Blood Thinners : Plan Eliquis 5 mg BID   Blood pressure- Normal BP is <120/80 mmHg.  Blood Pressure : Plan continue to work with PCP  Cholesterol- Ideal lipid profile is an LDL-cholesterol <70 mg/dl, total cholesterol <200 mg/dl, fasting triglycerides < 150 mg/dl and HDL-cholesterol >55 mg/dl.   Blood sugar- Blood Sugar : Plan Continue to work with PCP   Healthy physical activity- Goal is a moderate level of exercise at least 30 minutes/day, most days of the week.   Healthy weight- Goal is an ideal weight with a waistline <40\" for men or <35\" for women, and BMI of 18.5-25.   Healthy diet- is rich in vegetables, fruits, whole grains, legumes and fish, low in salt, and avoids red meats and processed/ refined foods.   Healthy sleep- is restorative, ~7 hours/night, with identification and treatment of obstructive/ central sleep apnea that increases the risk of stroke and heart disease.   Stroke Warning Signs- know the symptoms of stroke and the importance of calling 911/EMS to access the quickest treatment.     Orders Placed This Encounter   Procedures    MR venography intracranial w IV contrast    Referral to Neurosurgery     Signed:  Lizz Treadwell MD  PGY-5 Behavioral Neurology and Neuropsychiatry  11/04/24 1:32 PM                    "

## 2024-11-04 NOTE — PATIENT INSTRUCTIONS
Plan:    -Continue to stay well hydrated  -We recommend taking Eliquis 5 mg twice daily   -Repeat neuroimaging at the beginning of January   -Followup with neurosurgery regarding meningioma   -Continue to work with your primary doctor to manage blood sugar, blood pressure and cholesterol are all well managed  -Followup with us after your scan in January

## 2024-11-05 ENCOUNTER — TELEPHONE (OUTPATIENT)
Dept: GASTROENTEROLOGY | Facility: CLINIC | Age: 64
End: 2024-11-05
Payer: COMMERCIAL

## 2024-11-25 ENCOUNTER — APPOINTMENT (OUTPATIENT)
Dept: UROLOGY | Facility: CLINIC | Age: 64
End: 2024-11-25
Payer: COMMERCIAL

## 2024-11-25 VITALS — SYSTOLIC BLOOD PRESSURE: 184 MMHG | DIASTOLIC BLOOD PRESSURE: 88 MMHG | HEART RATE: 69 BPM | TEMPERATURE: 96.6 F

## 2024-11-25 DIAGNOSIS — R31.9 HEMATURIA, UNSPECIFIED TYPE: Primary | ICD-10-CM

## 2024-11-25 DIAGNOSIS — R82.90 ABNORMAL URINALYSIS: ICD-10-CM

## 2024-11-25 DIAGNOSIS — N20.0 STAGHORN CALCULUS: ICD-10-CM

## 2024-11-25 DIAGNOSIS — R79.1 ABNORMAL COAGULATION PROFILE: ICD-10-CM

## 2024-11-25 LAB
POC APPEARANCE, URINE: ABNORMAL
POC BILIRUBIN, URINE: NEGATIVE
POC BLOOD, URINE: ABNORMAL
POC COLOR, URINE: ABNORMAL
POC GLUCOSE, URINE: ABNORMAL MG/DL
POC KETONES, URINE: NEGATIVE MG/DL
POC LEUKOCYTES, URINE: NEGATIVE
POC NITRITE,URINE: NEGATIVE
POC PH, URINE: 6 PH
POC PROTEIN, URINE: ABNORMAL MG/DL
POC SPECIFIC GRAVITY, URINE: 1.01
POC UROBILINOGEN, URINE: 0.2 EU/DL

## 2024-11-25 PROCEDURE — 99204 OFFICE O/P NEW MOD 45 MIN: CPT | Performed by: UROLOGY

## 2024-11-25 PROCEDURE — 3079F DIAST BP 80-89 MM HG: CPT | Performed by: UROLOGY

## 2024-11-25 PROCEDURE — 3077F SYST BP >= 140 MM HG: CPT | Performed by: UROLOGY

## 2024-11-25 PROCEDURE — 81003 URINALYSIS AUTO W/O SCOPE: CPT | Performed by: UROLOGY

## 2024-11-25 PROCEDURE — 4010F ACE/ARB THERAPY RXD/TAKEN: CPT | Performed by: UROLOGY

## 2024-11-25 PROCEDURE — G2211 COMPLEX E/M VISIT ADD ON: HCPCS | Performed by: UROLOGY

## 2024-11-25 ASSESSMENT — PATIENT HEALTH QUESTIONNAIRE - PHQ9
2. FEELING DOWN, DEPRESSED OR HOPELESS: NOT AT ALL
1. LITTLE INTEREST OR PLEASURE IN DOING THINGS: NOT AT ALL
SUM OF ALL RESPONSES TO PHQ9 QUESTIONS 1 AND 2: 0

## 2024-11-25 NOTE — PROGRESS NOTES
PRIOR NOTES  64-year-old female seeing me for kidney stones  PMH: Hypertension, hyperlipidemia, factor V Leiden, type 2 diabetes, BMI 40.3, Crohn's, cerebral venous sinus thrombosis  On Jardiance, Eliquis    Patient had hematuria and underwent CT abdomen and pelvis with IV contrast on 7/2/2024    CT abdomen/pelvis with IV contrast 7/2/2024: Bilateral nephrolithiasis, left greater than right, on the left up to 2.2 cm stone in the renal pelvis, with additional upper and lower pole nonobstructing calculi.  On the right, similar size stone renal pelvis.    Having painless hematuria, without clots. Darker than fruit punch.     Past Medical History  She has a past medical history of Abnormal weight gain, Arrhythmia, Arthritis, COVID-19, Diabetes mellitus (Multi), Hyperlipidemia, Hypertension, Irregular heart beat, Irritable bowel syndrome, Joint pain, Nephrolithiasis, Other abnormal glucose, Other conditions influencing health status, Personal history of other diseases of the circulatory system, Personal history of other diseases of the musculoskeletal system and connective tissue, Radiculopathy, lumbosacral region, Seasonal allergies, Sprain of ligaments of thoracic spine, initial encounter, Type 2 diabetes mellitus, Urinary tract infection, and Wears glasses.    Surgical History  She has a past surgical history that includes Tonsillectomy (03/18/2015); Tubal ligation (03/18/2015); Hand surgery (03/18/2015); Knee arthroscopy w/ debridement (03/18/2015); Endometrial ablation; Dilation and curettage of uterus; Joint replacement (Bilateral); Carpal tunnel release; and Lipoma resection (Left).     Social History  She reports that she quit smoking about 19 years ago. Her smoking use included cigarettes. She started smoking about 29 years ago. She has a 5 pack-year smoking history. She has never used smokeless tobacco. She reports that she does not drink alcohol and does not use drugs.    Family History  Family History    Problem Relation Name Age of Onset    Uterine cancer Mother      Hypertension Father      Thyroid cancer Sister      Diabetes Brother      Graves' disease Brother      Multiple sclerosis Brother      Diabetes Maternal Grandmother      Skin cancer Maternal Grandmother      Hypertension Maternal Grandfather      Diabetes Paternal Grandfather      Hypertension Paternal Grandfather          Allergies  Aspirin, Caffeine, Cefditoren, Cetirizine, Fexofenadine, Phenylephrine-guaifenesin, Pseudoephedrine, Pseudoephedrine-guaifenesin, Salicylates, and Spectracef [cefditoren pivoxil]    ROS: 12 system review was completed and is negative with the exception of those signs and symptoms noted in the history of present illness: A 12 system review was completed and is negative with the exception of those signs and symptoms noted in the history of present illness.     Exam:  General: in NAD, appears stated age  Head: normocephalic, atraumatic  Respiratory: normal effort, no use of accessory muscles  Cardiovascular: no edema noted  Skin: normal turgor, no rashes  Neurologic: grossly intact, oriented to person/place/time  Psychiatric: mode and affect appropriate     Last Recorded Vitals  Blood pressure (!) 184/88, pulse 69, temperature 35.9 °C (96.6 °F), temperature source Temporal.    Lab Results   Component Value Date    CREATININE 0.75 10/07/2024    HGB 14.8 10/07/2024         ASSESSMENT/PLAN:  # Bilateral staghorn calculi, gross hematuria  -Plan for bilateral staged PCNL's  -Patient will need to hold his Eliquis for 72 hours before each surgery, typically 5 days after surgery  -Discussed risks of surgery, biggest risk of the surgery is 3% risk of hemorrhage requiring blood transfusion  -Plan for outpatient surgery  -Preoperative urine culture, CBC, BMP, coags    Sunny Ordonez MD

## 2024-12-05 PROBLEM — N20.0 STAGHORN CALCULUS: Status: ACTIVE | Noted: 2024-11-25

## 2024-12-05 RX ORDER — CIPROFLOXACIN 2 MG/ML
400 INJECTION, SOLUTION INTRAVENOUS ONCE
OUTPATIENT
Start: 2024-12-05 | End: 2024-12-05

## 2024-12-05 RX ORDER — ACETAMINOPHEN 325 MG/1
975 TABLET ORAL ONCE
OUTPATIENT
Start: 2024-12-05 | End: 2024-12-05

## 2024-12-11 ENCOUNTER — APPOINTMENT (OUTPATIENT)
Dept: GASTROENTEROLOGY | Facility: CLINIC | Age: 64
End: 2024-12-11
Payer: COMMERCIAL

## 2024-12-11 ENCOUNTER — PATIENT MESSAGE (OUTPATIENT)
Dept: UROLOGY | Facility: CLINIC | Age: 64
End: 2024-12-11

## 2024-12-11 VITALS
SYSTOLIC BLOOD PRESSURE: 144 MMHG | BODY MASS INDEX: 39.67 KG/M2 | HEIGHT: 64 IN | RESPIRATION RATE: 18 BRPM | DIASTOLIC BLOOD PRESSURE: 84 MMHG | HEART RATE: 76 BPM | WEIGHT: 232.4 LBS | OXYGEN SATURATION: 97 %

## 2024-12-11 DIAGNOSIS — K52.9 COLITIS: ICD-10-CM

## 2024-12-11 DIAGNOSIS — K50.118 CROHN'S DISEASE OF COLON WITH OTHER COMPLICATION: Primary | ICD-10-CM

## 2024-12-11 DIAGNOSIS — L65.9 HAIR LOSS: ICD-10-CM

## 2024-12-11 DIAGNOSIS — R11.2 NAUSEA AND VOMITING, UNSPECIFIED VOMITING TYPE: ICD-10-CM

## 2024-12-11 DIAGNOSIS — K57.90 DIVERTICULOSIS: ICD-10-CM

## 2024-12-11 DIAGNOSIS — N20.0 NEPHROLITHIASIS: ICD-10-CM

## 2024-12-11 DIAGNOSIS — R10.84 GENERALIZED ABDOMINAL PAIN: ICD-10-CM

## 2024-12-11 PROCEDURE — 99215 OFFICE O/P EST HI 40 MIN: CPT | Performed by: STUDENT IN AN ORGANIZED HEALTH CARE EDUCATION/TRAINING PROGRAM

## 2024-12-11 PROCEDURE — 3079F DIAST BP 80-89 MM HG: CPT | Performed by: STUDENT IN AN ORGANIZED HEALTH CARE EDUCATION/TRAINING PROGRAM

## 2024-12-11 PROCEDURE — 3008F BODY MASS INDEX DOCD: CPT | Performed by: STUDENT IN AN ORGANIZED HEALTH CARE EDUCATION/TRAINING PROGRAM

## 2024-12-11 PROCEDURE — 1036F TOBACCO NON-USER: CPT | Performed by: STUDENT IN AN ORGANIZED HEALTH CARE EDUCATION/TRAINING PROGRAM

## 2024-12-11 PROCEDURE — 3077F SYST BP >= 140 MM HG: CPT | Performed by: STUDENT IN AN ORGANIZED HEALTH CARE EDUCATION/TRAINING PROGRAM

## 2024-12-11 PROCEDURE — 4010F ACE/ARB THERAPY RXD/TAKEN: CPT | Performed by: STUDENT IN AN ORGANIZED HEALTH CARE EDUCATION/TRAINING PROGRAM

## 2024-12-11 RX ORDER — GLUCOSAMINE/CHONDRO SU A 500-400 MG
1 TABLET ORAL 3 TIMES DAILY
COMMUNITY

## 2024-12-11 NOTE — PROGRESS NOTES
Subjective     History of Present Illness:   Savi Govea is a 63 y.o. female with hx of T2DM (A1c of ~ 7), obesity who presents to clinic for evaluation of chronic symptoms of nausea, emesis, abdominal pain and diarrhea with new findings of colonic Crohn's disease on colonoscopy on Skyrizi (08/2024)    Since our last visit she completed MRI to better evaluate her ongoing emesis. With findings of meningioma and venous sinus thrombosis. Now on apixaban.  She reports her diarrhea has improved and now seldomly has diarrhea however she continues to have abdominal pain in her flanks as well as daily nausea and vomiting 3 times a week.  She states her vomiting is usually in the morning. There are days with no vomiting and day where she vomits multiple times. Her emesis usually contains bile.  She otherwise has not noticed any blood in her stool.  She has blood in her urine daily.  She is planned for urologic procedure in January and following up with neurosurgery end of December.    She states her abdominal pain is located in both flanks and radiates to her pelvic region. It does not change with bowel movements.       With regards to her hx,  She was  started on first infusion of Skyrizi 08/26. Now on injections.   Things were going well up until this morning when her symptoms recurred specifically mostly nausea with nonbloody nonbilious emesis.  She has also noticed that her blood pressures have been lower at home as well as 110s over 50s before taking her antihypertensives and she is planning to follow-up with her cardiologist regarding this.    She has previously tried Copmazine for nausea that did not help  Bentyl caused leg cramps, but zofran might have as well.      Past Medical History   has a past medical history of Abnormal weight gain, Arrhythmia, Arthritis, COVID-19, Diabetes mellitus (Multi), Hyperlipidemia, Hypertension, Irregular heart beat, Irritable bowel syndrome, Joint pain, Nephrolithiasis, Other  abnormal glucose, Other conditions influencing health status, Personal history of other diseases of the circulatory system, Personal history of other diseases of the musculoskeletal system and connective tissue, Radiculopathy, lumbosacral region, Seasonal allergies, Sprain of ligaments of thoracic spine, initial encounter, Type 2 diabetes mellitus, Urinary tract infection, and Wears glasses.     Social History   reports that she quit smoking about 19 years ago. Her smoking use included cigarettes. She started smoking about 29 years ago. She has a 5 pack-year smoking history. She has never used smokeless tobacco. She reports that she does not drink alcohol and does not use drugs.     Family History  family history includes Diabetes in her brother, maternal grandmother, and paternal grandfather; Graves' disease in her brother; Hypertension in her father, maternal grandfather, and paternal grandfather; Multiple sclerosis in her brother; Skin cancer in her maternal grandmother; Thyroid cancer in her sister; Uterine cancer in her mother.     Allergies  Allergies   Allergen Reactions    Aspirin GI Upset     Jittery feeling    Caffeine Unknown    Cefditoren Unknown    Cetirizine Other     Leg cramps    Fexofenadine Other     Leg cramps    Phenylephrine-Guaifenesin Other     Patient states that she gets very hyper and cannot sleep.    Pseudoephedrine Palpitations    Pseudoephedrine-Guaifenesin Unknown    Salicylates Other    Spectracef [Cefditoren Pivoxil] Unknown       Medications  Current Outpatient Medications   Medication Instructions    alpha lipoic acid 300 mg capsule 1 capsule, 2 times daily    ammonium lactate (Lac-Hydrin) 12 % lotion Daily PRN    apixaban (ELIQUIS) 2.5 mg, oral, 2 times daily    ascorbic acid (VITAMIN C) 1,000 mg, Daily    calcium citrate-vitamin D3 200 mg-3.125 mcg (125 unit) tablet 1 tablet, Daily    CHROMIUM PICOLINATE ORAL 1 tablet, Daily    cinnamon (CINNAMON) 500 mg, 2 times daily     glimepiride (AMARYL) 4 mg, 2 times daily    glucosamine-chondroitin 500-400 mg tablet 1 tablet, 3 times daily    irbesartan (AVAPRO) 300 mg, Daily    Januvia 100 mg tablet Take 1 tablet (100 mg) by mouth once daily.    Jardiance 25 mg, Daily    magnesium oxide 400 mg magnesium capsule 1 capsule, Daily    metFORMIN (GLUCOPHAGE) 1,000 mg, 2 times daily (morning and late afternoon)    metoprolol succinate XL (KAPSPARGO SPRINKLE) 100 mg, Daily    multivitamin tablet 1 tablet, Daily RT    ondansetron ODT (ZOFRAN-ODT) 8 mg, oral, Every 8 hours PRN    OneTouch Delica Plus Lancet 30 gauge misc     OneTouch Verio test strips strip     pomegranate fruit extract 250 mg capsule 1 capsule, 2 times daily    potassium citrate 99 mg, Daily RT    risankizumab-rzaa (SKYRIZI) 180 mg, subcutaneous, Every 8 weeks    saccharomyces boulardii (FLORASTOR) 250 mg, 2 times daily    Soma 350 mg, Daily    zinc gluconate 50 mg tablet 1 tablet, Daily        Objective   Visit Vitals  /84 (BP Location: Right arm, Patient Position: Sitting, BP Cuff Size: Large adult)   Pulse 76   Resp 18        Physical Exam  Constitutional:       General: She is not in acute distress.     Appearance: Normal appearance.   HENT:      Head: Normocephalic and atraumatic.      Mouth/Throat:      Mouth: Mucous membranes are moist.   Eyes:      Extraocular Movements: Extraocular movements intact.   Pulmonary:      Effort: Pulmonary effort is normal.      Breath sounds: No stridor. No wheezing.   Abdominal:      General: Abdomen is flat. There is no distension.   Musculoskeletal:         General: Normal range of motion.   Skin:     General: Skin is warm and dry.   Neurological:      General: No focal deficit present.      Mental Status: She is alert.   Psychiatric:         Mood and Affect: Mood normal.         Judgment: Judgment normal.         Assessment/Plan   Savi Govea is a 63 y.o. female with hx of T2DM (A1c of ~ 7), obesity who presents to clinic for  evaluation of chronic symptoms of nausea, emesis, abdominal pain and diarrhea with new findings of colonic Crohn's disease on colonoscopy on Skyrizi (08/2024). Diarrheal symptoms improving on Skyrizi however due to ongoing emesis that was out of proportion to her IBD MR obtained demonstrating ?meningoma and venous sinus thrombosis now on apixaban. Also with diagnosis of extensive urolithiasis.     She otherwise has no obstructive process on enterography or bidirectional endoscopy.  . GES reviewed and shows dumping phenotype, however this might be due to enhanced gastrocolic reflex in setting of untreated IBD.  No evidence of gastroparesis. She is planned for urologic and neurosurgery follow up.     Will obtain nutritional workup today due to hair loss. Will obtain fecal calpro for trending and based on this will determine timing of follow up colonoscopy.     She is agreeable to plan all questions answered. Discussed need to follow up with PCP regarding HM for annual vaccines including flu/PNA vaccine given IS status.         Problem List Items Addressed This Visit    None  Visit Diagnoses       Crohn's disease of colon with other complication    -  Primary    Generalized abdominal pain        Relevant Orders    Calprotectin, Fecal    Hair loss        Relevant Orders    Vitamin B12    Ferritin    Iron and TIBC    Folate    Nausea and vomiting, unspecified vomiting type        Relevant Orders    Cortisol AM    Diverticulosis        Colitis        Nephrolithiasis                         Melly Winn MD         My final recommendations will be communicated back to the requesting physician by way of shared Medical record or letter to requesting physician via fax.

## 2024-12-13 ENCOUNTER — LAB (OUTPATIENT)
Dept: LAB | Facility: LAB | Age: 64
End: 2024-12-13
Payer: COMMERCIAL

## 2024-12-13 DIAGNOSIS — R11.2 NAUSEA AND VOMITING, UNSPECIFIED VOMITING TYPE: ICD-10-CM

## 2024-12-13 DIAGNOSIS — L65.9 HAIR LOSS: ICD-10-CM

## 2024-12-13 LAB
FERRITIN SERPL-MCNC: 19 NG/ML (ref 8–150)
FOLATE SERPL-MCNC: >22.3 NG/ML
IRON SATN MFR SERPL: 9 % (ref 25–45)
IRON SERPL-MCNC: 41 UG/DL (ref 35–150)
TIBC SERPL-MCNC: 462 UG/DL (ref 240–445)
UIBC SERPL-MCNC: 421 UG/DL (ref 110–370)
VIT B12 SERPL-MCNC: 329 PG/ML (ref 211–911)

## 2024-12-13 PROCEDURE — 83540 ASSAY OF IRON: CPT

## 2024-12-13 PROCEDURE — 82746 ASSAY OF FOLIC ACID SERUM: CPT

## 2024-12-13 PROCEDURE — 83550 IRON BINDING TEST: CPT

## 2024-12-13 PROCEDURE — 82533 TOTAL CORTISOL: CPT

## 2024-12-13 PROCEDURE — 36415 COLL VENOUS BLD VENIPUNCTURE: CPT

## 2024-12-13 PROCEDURE — 82607 VITAMIN B-12: CPT

## 2024-12-13 PROCEDURE — 82728 ASSAY OF FERRITIN: CPT

## 2024-12-14 LAB — CORTIS AM PEAK SERPL-MSCNC: 9.4 UG/DL (ref 5–20)

## 2024-12-16 ENCOUNTER — LAB (OUTPATIENT)
Dept: LAB | Facility: LAB | Age: 64
End: 2024-12-16
Payer: COMMERCIAL

## 2024-12-16 DIAGNOSIS — R10.84 GENERALIZED ABDOMINAL PAIN: ICD-10-CM

## 2024-12-16 PROCEDURE — 83993 ASSAY FOR CALPROTECTIN FECAL: CPT

## 2024-12-19 LAB — CALPROTECTIN STL-MCNT: <5 UG/G

## 2024-12-30 ENCOUNTER — APPOINTMENT (OUTPATIENT)
Dept: NEUROSURGERY | Facility: CLINIC | Age: 64
End: 2024-12-30
Payer: COMMERCIAL

## 2024-12-30 ENCOUNTER — PATIENT MESSAGE (OUTPATIENT)
Dept: UROLOGY | Facility: CLINIC | Age: 64
End: 2024-12-30

## 2024-12-30 VITALS
DIASTOLIC BLOOD PRESSURE: 74 MMHG | HEIGHT: 64 IN | WEIGHT: 230.3 LBS | BODY MASS INDEX: 39.32 KG/M2 | SYSTOLIC BLOOD PRESSURE: 122 MMHG | HEART RATE: 72 BPM | TEMPERATURE: 96.9 F

## 2024-12-30 DIAGNOSIS — D32.9 MENINGIOMA (MULTI): ICD-10-CM

## 2024-12-30 ASSESSMENT — PATIENT HEALTH QUESTIONNAIRE - PHQ9
1. LITTLE INTEREST OR PLEASURE IN DOING THINGS: NOT AT ALL
2. FEELING DOWN, DEPRESSED OR HOPELESS: NOT AT ALL
SUM OF ALL RESPONSES TO PHQ9 QUESTIONS 1 & 2: 0

## 2024-12-30 ASSESSMENT — LIFESTYLE VARIABLES
SKIP TO QUESTIONS 9-10: 1
HOW OFTEN DO YOU HAVE A DRINK CONTAINING ALCOHOL: NEVER
HOW MANY STANDARD DRINKS CONTAINING ALCOHOL DO YOU HAVE ON A TYPICAL DAY: PATIENT DOES NOT DRINK
HOW OFTEN DO YOU HAVE SIX OR MORE DRINKS ON ONE OCCASION: NEVER
AUDIT-C TOTAL SCORE: 0

## 2024-12-30 NOTE — PROGRESS NOTES
[facility] [place]  Department of Neurological Surgery  New Patient Visit    History of Present Illness:  Savi Govea is a 64 y.o. year old female who presents to the spine clinic with incidental finding of left parafalcine meningioma after being followed for venous sinus thrombosis.  She denies headache or migraine, vision changes, uncoordinated movements, numbness or weakness, or balance disturbance.  She does endorse nausea and vomiting that also has significant bilateral kidney stones and referred pain which is believed to be the source of her nausea and vomiting.  Unchanged October 30 from October 4.    Prior Spine Surgeries: none    Physical Therapy: no  Diabetic: no   Osteoporosis: no  Patient's BMI is Body mass index is 39.53 kg/m².    14/14 systems reviewed and negative other than what is listed in the history of present illness    Patient Active Problem List   Diagnosis    Primary osteoarthritis of right knee    Abnormal increased muscle tone    Acute lumbar radiculopathy    Shoulder capsulitis, right    Cervical neuritis    Cervical spondylosis with radiculopathy    Chronic left shoulder pain    Essential hypertension    Fatigue    Ganglion cyst    Hyperglycemia    Hyperlipidemia    Left cervical radiculopathy    Mechanical low back pain    Neck pain    Obesity    Osteoarthritis of left hip    Obesity    Condition not found    Knee pain    Status post right knee replacement    Arthropathy of multiple sites    Type 2 diabetes mellitus without complication, without long-term current use of insulin (Multi)    Unilateral primary osteoarthritis, left knee    S/P reverse total shoulder arthroplasty, unspecified laterality    Osteoarthritis, knee    Crohn's disease of colon with complication (Multi)    Cerebral venous sinus thrombosis (HHS-HCC)    Hematuria    Meningioma (Multi)    Factor V Leiden carrier (Multi)    Staghorn calculus     Past Medical History:   Diagnosis Date    Abnormal  weight gain     Weight gain, abnormal    Arrhythmia     Arthritis     COVID-19     VACCINATED    Diabetes mellitus (Multi)     Hyperlipidemia     Hypertension     Irregular heart beat     Irritable bowel syndrome     Joint pain     Nephrolithiasis     Other abnormal glucose     Abnormal glucose    Other conditions influencing health status     Neck sprain and strain    Personal history of other diseases of the circulatory system     History of hypertension    Personal history of other diseases of the musculoskeletal system and connective tissue     History of degenerative disc disease    Radiculopathy, lumbosacral region     Lumbosacral neuritis    Seasonal allergies     Sprain of ligaments of thoracic spine, initial encounter     Thoracic sprain and strain    Type 2 diabetes mellitus     Urinary tract infection     Wears glasses      Past Surgical History:   Procedure Laterality Date    CARPAL TUNNEL RELEASE      DILATION AND CURETTAGE OF UTERUS      ENDOMETRIAL ABLATION      HAND SURGERY  2015    Hand Surgery                                                                                                                                                          JOINT REPLACEMENT Bilateral     bilateral knees    KNEE ARTHROSCOPY W/ DEBRIDEMENT  2015    Arthroscopy Knee Right    LIPOMA RESECTION Left     left arm    TONSILLECTOMY  2015    Tonsillectomy    TUBAL LIGATION  2015    Tubal Ligation     Social History     Tobacco Use    Smoking status: Former     Current packs/day: 0.00     Average packs/day: 0.5 packs/day for 10.0 years (5.0 ttl pk-yrs)     Types: Cigarettes     Start date:      Quit date:      Years since quittin.0    Smokeless tobacco: Never   Substance Use Topics    Alcohol use: Never     family history includes Diabetes in her brother, maternal grandmother, and paternal grandfather; Graves' disease in her brother; Hypertension in her father, maternal grandfather,  and paternal grandfather; Multiple sclerosis in her brother; Skin cancer in her maternal grandmother; Thyroid cancer in her sister; Uterine cancer in her mother.    Current Outpatient Medications:     alpha lipoic acid 300 mg capsule, Take 1 capsule by mouth 2 times a day., Disp: , Rfl:     ammonium lactate (Lac-Hydrin) 12 % lotion, Apply topically once daily as needed. As directed, Disp: , Rfl:     apixaban (Eliquis) 2.5 mg tablet, Take 1 tablet (2.5 mg) by mouth 2 times a day., Disp: 60 tablet, Rfl: 2    ascorbic acid (Vitamin C) 1,000 mg tablet, Take 1 tablet (1,000 mg) by mouth once daily., Disp: , Rfl:     calcium citrate-vitamin D3 200 mg-3.125 mcg (125 unit) tablet, Take 1 tablet by mouth once daily., Disp: , Rfl:     CHROMIUM PICOLINATE ORAL, Take 1 tablet by mouth once daily., Disp: , Rfl:     Cinnamon 500 mg capsule, Take 1 capsule (500 mg) by mouth 2 times a day., Disp: , Rfl:     glimepiride (Amaryl) 4 mg tablet, Take 1 tablet (4 mg) by mouth 2 times a day., Disp: , Rfl:     glucosamine-chondroitin 500-400 mg tablet, Take 1 tablet by mouth 3 times a day., Disp: , Rfl:     irbesartan (Avapro) 300 mg tablet, Take 1 tablet (300 mg) by mouth once daily. (Patient taking differently: Take 0.5 tablets (150 mg) by mouth once daily.), Disp: , Rfl:     Januvia 100 mg tablet, Take 1 tablet (100 mg) by mouth once daily., Disp: , Rfl:     Jardiance 25 mg, Take 1 tablet (25 mg) by mouth once daily., Disp: , Rfl:     magnesium oxide 400 mg magnesium capsule, Take 1 capsule (400 mg) by mouth once daily., Disp: , Rfl:     metFORMIN (Glucophage) 1,000 mg tablet, Take 1 tablet (1,000 mg) by mouth 2 times daily (morning and late afternoon)., Disp: , Rfl:     metoprolol succinate XL (Kapspargo Sprinkle) 100 mg 24 hr capsule, Take 1 capsule (100 mg) by mouth once daily., Disp: , Rfl:     multivitamin tablet, Take 1 tablet by mouth once daily., Disp: , Rfl:     ondansetron ODT (Zofran-ODT) 8 mg disintegrating tablet, Take 1  tablet (8 mg) by mouth every 8 hours if needed for nausea or vomiting., Disp: 27 tablet, Rfl: 1    OneTouch Delica Plus Lancet 30 gauge misc, , Disp: , Rfl:     OneTouch Verio test strips strip, , Disp: , Rfl:     pomegranate fruit extract 250 mg capsule, Take 1 capsule by mouth 2 times a day., Disp: , Rfl:     potassium citrate 99 mg capsule, Take 99 mg by mouth once daily., Disp: , Rfl:     risankizumab-rzaa (Skyrizi) 180 mg/1.2 mL (150 mg/mL) wearable injector injection, Inject 1.2 mL (180 mg) under the skin every 8 (eight) weeks., Disp: 1.2 mL, Rfl: 11    saccharomyces boulardii (Florastor) 250 mg capsule, Take 1 capsule (250 mg) by mouth 2 times a day., Disp: , Rfl:     Soma 350 mg tablet, Take 1 tablet (350 mg) by mouth once daily., Disp: , Rfl:     zinc gluconate 50 mg tablet, Take 1 tablet (50 mg) by mouth once daily., Disp: , Rfl:   Allergies   Allergen Reactions    Aspirin GI Upset     Jittery feeling    Caffeine Unknown    Cefditoren Unknown    Cetirizine Other     Leg cramps    Fexofenadine Other     Leg cramps    Phenylephrine-Guaifenesin Other     Patient states that she gets very hyper and cannot sleep.    Pseudoephedrine Palpitations    Pseudoephedrine-Guaifenesin Unknown    Salicylates Other    Spectracef [Cefditoren Pivoxil] Unknown       Physical Examination    General: Well developed, awake/alert/oriented x3, no distress, alert and cooperative  Skin: Warm and dry, no lesions, no rashes  ENMT: Mucous membranes moist, no apparent injury, no lesions seen  Head/Neck: Neck Supple, no apparent injury  Respiratory/Thorax: Normal breath sounds with good chest expansion, thorax symmetric  Cardiovascular: No pitting edema, no JVD  CNII-XI largely intact, no drift, no dysmetria    Motor Strength: 5/5 Throughout upper and lower extremities    Muscle Bulk: Normal and symmetric in all extremities     Paraspinal muscle spasm/tenderness absent.   Midline tenderness absent    Sensation: intact to light touch           Assessment and Plan:  Savi Govea is a 64 y.o. year old female who presents to the spine clinic with incidental finding of left parafalcine meningioma after being followed for venous sinus thrombosis.  She denies headache or migraine, vision changes, uncoordinated movements, numbness or weakness, or balance disturbance.  She does endorse nausea and vomiting that also has significant bilateral kidney stones and referred pain which is believed to be the source of her nausea and vomiting.  Unchanged October 30 from October 4.    Currently asymptomatic and therefore recommended follow-up 3 to 6 months for surveillance imaging and evaluation for increase in size.  MRI with and without contrast ordered.    I have reviewed all prior documentation and reviewed the electronic medical record since admission. I have personally have reviewed all advanced imaging not just the reports and used my interpretation as documented as the relevant findings. I have reviewed the risks and benefits of all treatment recommendations listed in this note with the patient and family.       The above clinical summary has been dictated with voice recognition software. It has not been proofread for grammatical errors, typographical mistakes, or other semantic inconsistencies.    Thank you for visiting our office today. It was our pleasure to take part in your healthcare.     Do not hesitate to call with any questions regarding your plan of care after leaving at [phone] M-F 8am-4pm.     To clinicians, thank you very much for this kind referral. It is a privilege to partner with you in the care of your patients. My office would be delighted to assist you with any further consultations or with questions regarding the plan of care outlined. Do not hesitate to call the office or contact me directly.       Sincerely,      DAO Talavera, PA-C  Associate Physician Assistant, Neurosurgery  Clinical   [facility]  [facility] [employer] [facility]    [place] [id number] [facility]  [address] [place] [id number]    Phone: [phone]  Fax: [phone]

## 2024-12-31 ENCOUNTER — PRE-ADMISSION TESTING (OUTPATIENT)
Dept: PREADMISSION TESTING | Facility: HOSPITAL | Age: 64
End: 2024-12-31
Payer: COMMERCIAL

## 2024-12-31 ENCOUNTER — LAB (OUTPATIENT)
Dept: LAB | Facility: LAB | Age: 64
End: 2024-12-31
Payer: COMMERCIAL

## 2024-12-31 VITALS
RESPIRATION RATE: 16 BRPM | BODY MASS INDEX: 39.37 KG/M2 | DIASTOLIC BLOOD PRESSURE: 63 MMHG | TEMPERATURE: 96.8 F | SYSTOLIC BLOOD PRESSURE: 128 MMHG | OXYGEN SATURATION: 96 % | HEIGHT: 64 IN | WEIGHT: 230.6 LBS | HEART RATE: 71 BPM

## 2024-12-31 DIAGNOSIS — N20.0 STAGHORN CALCULUS: ICD-10-CM

## 2024-12-31 DIAGNOSIS — R82.90 ABNORMAL URINALYSIS: ICD-10-CM

## 2024-12-31 DIAGNOSIS — B37.49 YEAST UTI: Primary | ICD-10-CM

## 2024-12-31 DIAGNOSIS — Z01.818 PREOP EXAMINATION: Primary | ICD-10-CM

## 2024-12-31 DIAGNOSIS — R79.1 ABNORMAL COAGULATION PROFILE: ICD-10-CM

## 2024-12-31 LAB
ABO GROUP (TYPE) IN BLOOD: NORMAL
ANION GAP SERPL CALC-SCNC: 14 MMOL/L (ref 10–20)
ANTIBODY SCREEN: NORMAL
APPEARANCE UR: ABNORMAL
APTT PPP: 37 SECONDS (ref 27–38)
BILIRUB UR STRIP.AUTO-MCNC: NEGATIVE MG/DL
BUN SERPL-MCNC: 9 MG/DL (ref 6–23)
CALCIUM SERPL-MCNC: 9.4 MG/DL (ref 8.6–10.3)
CHLORIDE SERPL-SCNC: 103 MMOL/L (ref 98–107)
CO2 SERPL-SCNC: 26 MMOL/L (ref 21–32)
COLOR UR: ABNORMAL
CREAT SERPL-MCNC: 0.73 MG/DL (ref 0.5–1.05)
EGFRCR SERPLBLD CKD-EPI 2021: >90 ML/MIN/1.73M*2
ERYTHROCYTE [DISTWIDTH] IN BLOOD BY AUTOMATED COUNT: 13.1 % (ref 11.5–14.5)
GLUCOSE SERPL-MCNC: 145 MG/DL (ref 74–99)
GLUCOSE UR STRIP.AUTO-MCNC: ABNORMAL MG/DL
HCT VFR BLD AUTO: 42.2 % (ref 36–46)
HGB BLD-MCNC: 13 G/DL (ref 12–16)
HOLD SPECIMEN: NORMAL
INR PPP: 1.8 (ref 0.9–1.1)
KETONES UR STRIP.AUTO-MCNC: NEGATIVE MG/DL
LEUKOCYTE ESTERASE UR QL STRIP.AUTO: ABNORMAL
MCH RBC QN AUTO: 24.9 PG (ref 26–34)
MCHC RBC AUTO-ENTMCNC: 30.8 G/DL (ref 32–36)
MCV RBC AUTO: 81 FL (ref 80–100)
MUCOUS THREADS #/AREA URNS AUTO: ABNORMAL /LPF
NITRITE UR QL STRIP.AUTO: NEGATIVE
NRBC BLD-RTO: 0 /100 WBCS (ref 0–0)
PH UR STRIP.AUTO: 5.5 [PH]
PLATELET # BLD AUTO: 360 X10*3/UL (ref 150–450)
POTASSIUM SERPL-SCNC: 4.3 MMOL/L (ref 3.5–5.3)
PROT UR STRIP.AUTO-MCNC: ABNORMAL MG/DL
PROTHROMBIN TIME: 20.6 SECONDS (ref 9.8–12.8)
RBC # BLD AUTO: 5.23 X10*6/UL (ref 4–5.2)
RBC # UR STRIP.AUTO: ABNORMAL /UL
RBC #/AREA URNS AUTO: >20 /HPF
RH FACTOR (ANTIGEN D): NORMAL
SODIUM SERPL-SCNC: 139 MMOL/L (ref 136–145)
SP GR UR STRIP.AUTO: 1.03
SQUAMOUS #/AREA URNS AUTO: ABNORMAL /HPF
UROBILINOGEN UR STRIP.AUTO-MCNC: NORMAL MG/DL
WBC # BLD AUTO: 9 X10*3/UL (ref 4.4–11.3)
WBC #/AREA URNS AUTO: >50 /HPF
YEAST BUDDING #/AREA UR COMP ASSIST: PRESENT /HPF

## 2024-12-31 PROCEDURE — 81001 URINALYSIS AUTO W/SCOPE: CPT

## 2024-12-31 PROCEDURE — 80048 BASIC METABOLIC PNL TOTAL CA: CPT

## 2024-12-31 PROCEDURE — 85610 PROTHROMBIN TIME: CPT

## 2024-12-31 PROCEDURE — 85027 COMPLETE CBC AUTOMATED: CPT

## 2024-12-31 PROCEDURE — 87086 URINE CULTURE/COLONY COUNT: CPT

## 2024-12-31 PROCEDURE — 85730 THROMBOPLASTIN TIME PARTIAL: CPT

## 2024-12-31 PROCEDURE — 86901 BLOOD TYPING SEROLOGIC RH(D): CPT

## 2024-12-31 PROCEDURE — 86850 RBC ANTIBODY SCREEN: CPT

## 2024-12-31 PROCEDURE — 86900 BLOOD TYPING SEROLOGIC ABO: CPT

## 2024-12-31 RX ORDER — FLUCONAZOLE 100 MG/1
TABLET ORAL
Qty: 8 TABLET | Refills: 0 | Status: SHIPPED | OUTPATIENT
Start: 2024-12-31

## 2024-12-31 ASSESSMENT — DUKE ACTIVITY SCORE INDEX (DASI)
CAN YOU RUN A SHORT DISTANCE: NO
CAN YOU DO YARD WORK LIKE RAKING LEAVES, WEEDING OR PUSHING A MOWER: NO
CAN YOU TAKE CARE OF YOURSELF (EAT, DRESS, BATHE, OR USE TOILET): YES
TOTAL_SCORE: 18.95
CAN YOU DO LIGHT WORK AROUND THE HOUSE LIKE DUSTING OR WASHING DISHES: YES
CAN YOU PARTICIPATE IN STRENOUS SPORTS LIKE SWIMMING, SINGLES TENNIS, FOOTBALL, BASKETBALL, OR SKIING: NO
CAN YOU WALK A BLOCK OR TWO ON LEVEL GROUND: YES
CAN YOU DO HEAVY WORK AROUND THE HOUSE LIKE SCRUBBING FLOORS OR LIFTING AND MOVING HEAVY FURNITURE: NO
DASI METS SCORE: 5.1
CAN YOU CLIMB A FLIGHT OF STAIRS OR WALK UP A HILL: YES
CAN YOU WALK INDOORS, SUCH AS AROUND YOUR HOUSE: YES
CAN YOU HAVE SEXUAL RELATIONS: NO
CAN YOU DO MODERATE WORK AROUND THE HOUSE LIKE VACUUMING, SWEEPING FLOORS OR CARRYING GROCERIES: YES
CAN YOU PARTICIPATE IN MODERATE RECREATIONAL ACTIVITIES LIKE GOLF, BOWLING, DANCING, DOUBLES TENNIS OR THROWING A BASEBALL OR FOOTBALL: NO

## 2024-12-31 ASSESSMENT — PAIN - FUNCTIONAL ASSESSMENT: PAIN_FUNCTIONAL_ASSESSMENT: 0-10

## 2024-12-31 ASSESSMENT — LIFESTYLE VARIABLES: SMOKING_STATUS: NONSMOKER

## 2024-12-31 ASSESSMENT — ACTIVITIES OF DAILY LIVING (ADL): ADL_SCORE: 0

## 2024-12-31 NOTE — PREPROCEDURE INSTRUCTIONS
Medication List            Accurate as of December 31, 2024 10:28 AM. Always use your most recent med list.                alpha lipoic acid 300 mg capsule  Additional Medication Adjustments for Surgery: Take last dose 7 days before surgery     ammonium lactate 12 % lotion  Commonly known as: Lac-Hydrin  Medication Adjustments for Surgery: Do Not take on the morning of surgery     apixaban 2.5 mg tablet  Commonly known as: Eliquis  Take 1 tablet (2.5 mg) by mouth 2 times a day.  Additional Medication Adjustments for Surgery: Other (Comment)  Notes to patient: Per provider recommendation     ascorbic acid 1,000 mg tablet  Commonly known as: Vitamin C  Additional Medication Adjustments for Surgery: Take last dose 7 days before surgery     calcium citrate-vitamin D3 200 mg-3.125 mcg (125 unit) tablet  Additional Medication Adjustments for Surgery: Take last dose 7 days before surgery     CHROMIUM PICOLINATE ORAL  Additional Medication Adjustments for Surgery: Take last dose 7 days before surgery     cinnamon 500 mg capsule  Additional Medication Adjustments for Surgery: Take last dose 7 days before surgery     glimepiride 4 mg tablet  Commonly known as: Amaryl  Medication Adjustments for Surgery: Do Not take on the morning of surgery     irbesartan 300 mg tablet  Commonly known as: Avapro  Medication Adjustments for Surgery: Take last dose 1 day (24 hours) before surgery     Januvia 100 mg tablet  Generic drug: SITagliptin phosphate  Medication Adjustments for Surgery: Do Not take on the morning of surgery     Jardiance 25 mg  Generic drug: empagliflozin  Medication Adjustments for Surgery: Take last dose 3 days before surgery     magnesium oxide 400 mg magnesium capsule  Additional Medication Adjustments for Surgery: Take last dose 7 days before surgery     metFORMIN 1,000 mg tablet  Commonly known as: Glucophage  Medication Adjustments for Surgery: Do Not take on the morning of surgery     metoprolol succinate XL  100 mg 24 hr capsule  Commonly known as: Kapspargo Sprinkle  Medication Adjustments for Surgery: Take/Use as prescribed     multivitamin tablet  Additional Medication Adjustments for Surgery: Take last dose 7 days before surgery     ondansetron ODT 8 mg disintegrating tablet  Commonly known as: Zofran-ODT  Take 1 tablet (8 mg) by mouth every 8 hours if needed for nausea or vomiting.  Medication Adjustments for Surgery: Take/Use as prescribed     OneTouch Delica Plus Lancet 30 gauge misc  Generic drug: lancets  Additional Medication Adjustments for Surgery: Other (Comment)  Notes to patient: N/A     OneTouch Verio test strips strip  Generic drug: blood sugar diagnostic  Additional Medication Adjustments for Surgery: Other (Comment)  Notes to patient: N/A     pomegranate fruit extract 250 mg capsule  Additional Medication Adjustments for Surgery: Take last dose 7 days before surgery     potassium citrate 99 mg capsule  Additional Medication Adjustments for Surgery: Take last dose 7 days before surgery     risankizumab-rzaa 180 mg/1.2 mL (150 mg/mL) wearable injector injection  Commonly known as: Skyrizi  Inject 1.2 mL (180 mg) under the skin every 8 (eight) weeks.  Additional Medication Adjustments for Surgery: Other (Comment)  Notes to patient: Per provider recommendations     saccharomyces boulardii 250 mg capsule  Commonly known as: Florastor  Additional Medication Adjustments for Surgery: Take last dose 7 days before surgery     Soma 350 mg tablet  Generic drug: carisoprodol  Medication Adjustments for Surgery: Do Not take on the morning of surgery     zinc gluconate 50 mg tablet  Additional Medication Adjustments for Surgery: Take last dose 7 days before surgery                                  PRE-OPERATIVE INSTRUCTIONS    You will receive notification one business day prior to your procedure to confirm your arrival time. It is important that you answer your phone and/or check your messages during this time. If  you do not hear from the surgery center by 5 pm. the day before your procedure, please call [phone].     Please enter the building through the Outpatient entrance and take the elevator off the lobby to the 2nd floor then check in at the Outpatient Surgery desk on the 2nd floor.    INSTRUCTIONS:  Talk to your surgeon for instructions if you should stop your aspirin, blood thinner, or diabetes medicines.  DO NOT take any multivitamins or over the counter supplements for 7-10 days before surgery.  If not being admitted, you must have an adult immediately available to drive you home after surgery. We also highly recommend you have someone stay with you for the entire day and night of your surgery.  For children having surgery, a parent or legal guardian must accompany them to the surgery center. If this is not possible, please call [phone] to make additional arrangements.  For adults who are unable to consent or make medical decisions for themselves, a legal guardian or Power of  must accompany them to the surgery center. If this is not possible, please call [phone] to make additional arrangements.  Wear comfortable, loose fitting clothing.  All jewelry and piercings must be removed. If you are unable to remove an item or have a dermal piercing, please be sure to tell the nurse when you arrive for surgery.  Nail polish and make-up must be removed.  Avoid smoking or consuming alcohol for 24 hours before surgery.  To help prevent infection, please take a shower/bath and wash your hair the night before and/or morning of surgery (or follow other specific bathing instructions provided).    Preoperative Fasting Guidelines    Why must I stop eating and drinking near surgery time?  With sedation, food or liquid in your stomach can enter your lungs causing serious complications  Increases nausea and vomiting    When do I need to stop eating and drinking before my surgery?  Do not eat any solid food after  midnight the night before your surgery/procedure unless otherwise instructed by your surgeon.   You may have up to 13.5 ounces of clear liquid until TWO hours before your instructed arrival time to the hospital.  This includes water, black tea/coffee, (no milk or cream) apple juice, and electrolyte drinks (Gatorade).   You may chew gum until TWO hours before your surgery/procedure      If applicable, notify your surgeons office immediately of any new skin changes that occur to the surgical limb.      If you have any questions or concerns, please call Pre-Admission Testing at [phone].       Home Preoperative Antibacterial Shower with Chlorhexidine gluconate (CHG)     What is a home preoperative antibacterial shower?  This shower is a way of cleaning the skin with a germ killing solution before surgery. The solution contains chlorhexidine gluconate, commonly known as CHG. CHG is a skin cleanser with germ killing ability. Let your doctor know if you are allergic to chlorhexidine.    Why do I need to take a preoperative antibacterial shower?  Skin is not sterile. It is best to try to make your skin as free of germs as possible before surgery. Proper cleansing with a germ killing soap before surgery can lower the number of germs on your skin. This helps to reduce the risk of infection at the surgical site. Following the instructions listed below will help you prepare your skin for surgery.    How do I use the solution?  Begin using your CHG soap the night before and again the morning of your procedure.   Do not shave the day before or day of surgery.  Remove all jewelry until after surgery. Take off rings and take out all body-piercing jewelry.  Wash your face and hair with normal soap and shampoo before you use the CHG soap.  Apply the CHG solution to a clean wet washcloth. Move away from the water to avoid premature rinsing of the CHG soap as you are applying. Firmly lather your entire body from the neck down.  Do not use CHG on your face, eyes, ears, or genitals.   Pay special attention to the area where your incisions will be located.  Do not scrub your skin too hard.  It is important to allow the CHG soap to sit on your skin for 3-5 minutes.  Rinse the solution off your body completely. Do not wash with your normal soap after the CHG soap solution.  Pat yourself dry with a clean, soft towel.  Do not apply powders, lotions or deodorants as these might block how the CHG soap works.   Dress in clean clothing.  Be sure to sleep with clean, freshly laundered sheets.  Be aware that CHG can cause stains on fabric. Rinse your washcloth and other linens that have contact with CHG completely. Use only non-chlorine detergents to launder the items used.

## 2024-12-31 NOTE — CPM/PAT H&P
CPM/PAT Evaluation       Name: Savi Govea (Savi Govea)  /Age: 1960/64 y.o.     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 Has hematuria but is on Eliquis. Some LLQ discomfort. Denies fever, chills or sweats.    Past Medical History:   Diagnosis Date    Abnormal weight gain     Weight gain, abnormal    Arrhythmia     Arthritis     Cervical disc disorder     Chronic diarrhea     COVID-19     VACCINATED    Crohn's disease (Multi)     Diabetes mellitus (Multi)     Diverticulosis     History of blood clot in brain     Hyperlipidemia     Hypertension     Irregular heart beat     Irritable bowel syndrome     Joint pain     Low back pain     Lumbosacral disc disease     Nephrolithiasis     Neuropathy in diabetes (Multi)     Numbness     Other abnormal glucose     Abnormal glucose    Other conditions influencing health status     Neck sprain and strain    Personal history of other diseases of the circulatory system     History of hypertension    Personal history of other diseases of the musculoskeletal system and connective tissue     History of degenerative disc disease    Radiculopathy, lumbosacral region     Lumbosacral neuritis    Seasonal allergies     Sprain of ligaments of thoracic spine, initial encounter     Thoracic sprain and strain    Type 2 diabetes mellitus     Urinary tract infection     Wears glasses        Past Surgical History:   Procedure Laterality Date    CARPAL TUNNEL RELEASE      DILATION AND CURETTAGE OF UTERUS      ENDOMETRIAL ABLATION      HAND SURGERY  2015    Hand Surgery                                                                                                                                                          JOINT REPLACEMENT Bilateral     bilateral knees    KNEE ARTHROSCOPY W/ DEBRIDEMENT  2015     "Arthroscopy Knee Right    LIPOMA RESECTION Left     left arm    TONSILLECTOMY  03/18/2015    Tonsillectomy    TUBAL LIGATION  03/18/2015    Tubal Ligation       Patient Sexual activity questions deferred to the physician.    Family History   Problem Relation Name Age of Onset    Uterine cancer Mother Cervical cancer     Cancer Mother Cervical cancer     Hypertension Father Derek     Heart attack Father Derek     Thyroid cancer Sister Thyroid     Cancer Sister Thyroid     Diabetes Brother Alirio     Graves' disease Brother Alirio     Multiple sclerosis Brother Alirio     Cancer Brother Eye cancer     Cancer Brother Abdominal cancer     Diabetes Maternal Grandmother      Skin cancer Maternal Grandmother      Hypertension Maternal Grandfather Josue     Diabetes Paternal Grandfather Jeferson     Hypertension Paternal Grandfather Jeferson     Stroke Mother's Sister Lizzette        Allergies   Allergen Reactions    Lactose GI Upset    Aspirin GI Upset     Jittery feeling    Caffeine Other     Extreme jitteryness    Cefditoren Other     \"So long ago I don't remember reaction\"    Cetirizine Other     Leg cramps    Fexofenadine Other     Leg cramps    Phenylephrine-Guaifenesin Other     Patient states that she gets very hyper and cannot sleep.    Pseudoephedrine Palpitations    Pseudoephedrine-Guaifenesin Other     \"Keeps me awake\"    Salicylates Other    Spectracef [Cefditoren Pivoxil] Other     \"So long ago I don't remember reaction\"       Prior to Admission medications    Medication Sig Start Date End Date Taking? Authorizing Provider   alpha lipoic acid 300 mg capsule Take 1 capsule by mouth 2 times a day.    Historical Provider, MD   ammonium lactate (Lac-Hydrin) 12 % lotion Apply topically once daily as needed. As directed 8/29/23   Historical Provider, MD   apixaban (Eliquis) 2.5 mg tablet Take 1 tablet (2.5 mg) by mouth 2 times a day. 10/30/24 10/30/25  Ugo Bates MD   ascorbic acid (Vitamin C) 1,000 mg tablet Take 1 " tablet (1,000 mg) by mouth once daily.    Historical Provider, MD   calcium citrate-vitamin D3 200 mg-3.125 mcg (125 unit) tablet Take 1 tablet by mouth once daily. 3/19/15   Historical Provider, MD   CHROMIUM PICOLINATE ORAL Take 1 tablet by mouth once daily.    Historical Provider, MD   Cinnamon 500 mg capsule Take 1 capsule (500 mg) by mouth 2 times a day. 10/2/19   Historical Provider, MD   glimepiride (Amaryl) 4 mg tablet Take 1 tablet (4 mg) by mouth 2 times a day.    Historical Provider, MD   glucosamine-chondroitin 500-400 mg tablet Take 1 tablet by mouth 3 times a day.    Historical Provider, MD   irbesartan (Avapro) 300 mg tablet Take 1 tablet (300 mg) by mouth once daily.  Patient taking differently: Take 0.5 tablets (150 mg) by mouth once daily. 10/2/19   Historical Provider, MD   Januvia 100 mg tablet Take 1 tablet (100 mg) by mouth once daily. 8/3/23   Historical Provider, MD   Jardiance 25 mg Take 1 tablet (25 mg) by mouth once daily.    Historical Provider, MD   magnesium oxide 400 mg magnesium capsule Take 1 capsule (400 mg) by mouth once daily.    Historical Provider, MD   metFORMIN (Glucophage) 1,000 mg tablet Take 1 tablet (1,000 mg) by mouth 2 times daily (morning and late afternoon).    Historical Provider, MD   metoprolol succinate XL (Kapspargo Sprinkle) 100 mg 24 hr capsule Take 1 capsule (100 mg) by mouth once daily. 10/2/19   Historical Provider, MD   multivitamin tablet Take 1 tablet by mouth once daily.    Historical Provider, MD   ondansetron ODT (Zofran-ODT) 8 mg disintegrating tablet Take 1 tablet (8 mg) by mouth every 8 hours if needed for nausea or vomiting. 9/11/24   MD Medardo Medellin Delica Plus Lancet 30 gauge misc  8/28/23   Historical Provider, MD   OneTouch Verio test strips strip  8/28/23   Historical Provider, MD   pomegranate fruit extract 250 mg capsule Take 1 capsule by mouth 2 times a day.    Historical Provider, MD   potassium citrate 99 mg capsule Take 99  mg by mouth once daily.    Historical Provider, MD   risankizumab-rzaa (Skyrizi) 180 mg/1.2 mL (150 mg/mL) wearable injector injection Inject 1.2 mL (180 mg) under the skin every 8 (eight) weeks. 8/28/24 8/28/25  MD yoni Medellinulardii (Florastor) 250 mg capsule Take 1 capsule (250 mg) by mouth 2 times a day.    Historical Provider, MD   Soma 350 mg tablet Take 1 tablet (350 mg) by mouth once daily.    Historical Provider, MD   zinc gluconate 50 mg tablet Take 1 tablet (50 mg) by mouth once daily.    Historical Provider, MD      Constitutional: Negative for fever, chills, or sweats   ENMT:glasses. Negative for nasal discharge, congestion, ear pain, mouth pain, throat pain   Respiratory: Negative for cough, wheezing, shortness of breath   Cardiac: Negative for chest pain, dyspnea on exertion, palpitations   Gastrointestinal:+Crohn's. Negative for nausea, vomiting, diarrhea, constipation, abdominal pain  Genitourinary:see hpi  Musculoskeletal: Negative for decreased ROM, pain, swelling, weakness  Neurological: Negative for dizziness, confusion, headache  Psychiatric: Negative for mood changes   Skin: Negative for itching, rash, ulcer    Hematologic/Lymph:+cerebral sinus thrombus. Negative for bruising, easy bleeding  Allergic/Immunologic: Negative itching, sneezing, swelling    CONSTITUTIONAL - overweight, well developed, looks like stated age  SKIN - normal skin color and pigmentation, no rash or lesions  HEAD - no trauma, normocephalic  EYES - pupils are equal and reactive to light, extraocular muscles are intact  ENT - uvula midline, normal tongue movement and throat normal, no exudate, nasal passage without discharge and patent  NECK - supple without rigidity, full ROM  CHEST - clear to auscultation, no wheezing, no crackles and no rales, good effort  CARDIAC - regular rate and regular rhythm, no skipped beats, no murmur  ABDOMEN - no organomegaly, soft, nontender, nondistended, normal bowel  "sounds, no guarding/rebound/rigidity   EXTREMITIES - no edema, no deformities  NEUROLOGICAL - normal gait, normal balance, normal motor; alert, oriented and no focal signs  PSYCHIATRIC - alert, pleasant and cordial, age-appropriate  IMMUNOLOGIC - no cervical lymphadenopathy    PAT AIRWAY:   Airway:     Mallampati::  II    Neck ROM::  Full  normal        Visit Vitals  /63   Pulse 71   Temp 36 °C (96.8 °F) (Temporal)   Resp 16   Ht 1.626 m (5' 4\")   Wt 105 kg (230 lb 9.6 oz)   SpO2 96%   BMI 39.58 kg/m²   OB Status Postmenopausal   Smoking Status Former   BSA 2.18 m²     EKG 7/2/2024: NSR HR 77 bpm with LVH    DASI Risk Score    No data to display       Caprini DVT Assessment      Flowsheet Row ED to Hosp-Admission (Discharged) from 10/7/2024 in [facility] 10 with Erica Lloyd MD and Chelsey Fields MD Admission (Discharged) from 1/5/2024 in [facility] 6 with Isaiah Shaw MD   DVT Score 9 filed at 10/07/2024 2259 10 filed at 01/05/2024 1344   Medical Factors Inflammatory bowel disease, History of DVT/PE filed at 10/07/2024 2259 --   BMI 31-40 (Obesity) filed at 10/07/2024 2259 41-50 (Morbid obesity) filed at 01/05/2024 1344   RETIRED: Current Status -- Elective major lower extremity arthroplasty filed at 01/05/2024 1344   RETIRED: Age -- 60-75 years filed at 01/05/2024 1344          Modified Frailty Index    No data to display       CHADS2 Stroke Risk  Current as of about an hour ago        N/A 3 to 100%: High Risk   2 to < 3%: Medium Risk   0 to < 2%: Low Risk     Last Change: N/A          This score determines the patient's risk of having a stroke if the patient has atrial fibrillation.        This score is not applicable to this patient. Components are not calculated.          Revised Cardiac Risk Index    No data to display       Apfel Simplified Score    No data to display       Risk Analysis Index Results This Encounter    No data found in the last 10 encounters.   "     Stop Bang Score      Flowsheet Row Admission (Discharged) from 1/5/2024 in [facility] 6 with Isaiah Sahw MD Pre-Admission Testing from 12/21/2023 in [facility]   Do you snore loudly? 0 filed at 01/05/2024 0938 0 filed at 12/21/2023 1259   Do you often feel tired or fatigued after your sleep? 0 filed at 01/05/2024 0938 0 filed at 12/21/2023 1259   Has anyone ever observed you stop breathing in your sleep? 0 filed at 01/05/2024 0938 0 filed at 12/21/2023 1259   Do you have or are you being treated for high blood pressure? 1 filed at 01/05/2024 0938 1 filed at 12/21/2023 1259   Recent BMI (Calculated) 43.3 filed at 01/05/2024 0938 43.3 filed at 12/21/2023 1259   Is BMI greater than 35 kg/m2? 1=Yes filed at 01/05/2024 0938 1=Yes filed at 12/21/2023 1259   Age older than 50 years old? 1=Yes filed at 01/05/2024 0938 1=Yes filed at 12/21/2023 1259   Is your neck circumference greater than 17 inches (Male) or 16 inches (Female)? 1 filed at 01/05/2024 0938 1 filed at 12/21/2023 1259   Gender - Male 0=No filed at 01/05/2024 0938 0=No filed at 12/21/2023 1259   STOP-BANG Total Score 4 filed at 01/05/2024 0938 4 filed at 12/21/2023 1259          Prodigy: High Risk  Total Score: 8              Prodigy Age Score           ARISCAT Score for Postoperative Pulmonary Complications    No data to display       Reagan Perioperative Risk for Myocardial Infarction or Cardiac Arrest (QUITA)    No data to display         Assessment and Plan:   63 yo female scheduled for left nephrolithotomy with holmium laser with Dr. Ordonez 1/22/2025. Labs ordered.    Pre-Op    Cardiac  DASI Score: 18.95   MET Score: 5.1      RCRI  0 which is 3.9% 30 day risk of MACE (risk for cardiac death, nonfatal myocardial infarction, and nonfactal cardiac arrest)  QUITA score which indicates a  0.05 % risk of intraoperative or 30-day postoperative MACE    Preoperative deep breathing educational handout provided to patient.  STOP BANG:    5  points which is a high risk for moderate to severe DMITRY  ARISCAT:    3  points which is a intermediate (13.3%) risk of in-hospital post-op pulmonary complications     Endocrine  Hemoglobin A1C   Date Value Ref Range Status   12/21/2023 7.5 (H) see below % Final   DM2- controlled on Amaryl, Januvia, Jardiance, Metformin    GI  Crohn's- controlled on Skyrizi    Apfel: 3 points 61% risk for post operative N/V    Hematologic  Cerebral sinus thrombus- on Eliquis    Psychiatric    Skin check: Patient was instructed to make surgeon aware of any skin changes/concerns prior to surgery.     Anesthesia:  Patient denies any anesthesia complications.     See risk scores as previously documented.

## 2025-01-02 LAB
BACTERIA UR CULT: NORMAL
BACTERIA UR CULT: NORMAL

## 2025-01-02 NOTE — DISCHARGE INSTRUCTIONS
DEPARTMENT OF Urology  DISCHARGE INSTRUCTIONS -- Percutaneous Nephrolithotomy (PCNL)  Outpatient Surgery    C O N F I D E N T I A L   I N F O R M A T I O N    Savi Govea    Call [phone] during regular daytime business hours (8:00 am - 5:00 pm) and after 5:00 pm and ask for the Urology resident with any questions or concerns.    If it is a life-threatening situation, proceed to the nearest emergency department.        Follow-up appointment:  1/22/25      Thank you for the opportunity to care for you today.  Your health and healing are very important to us.  We hope we made you feel as comfortable as possible and are committed to your recovery and continued well-being.      The following is a brief overview of your PCNL (percutaneous nephrolithotomy) procedure today. Some of the information contained on this summary may be confidential.  This information should be kept in your records and should be shared with your regular doctor.    Physicians:   Dr. Ordonez    Procedure performed: Lasering of your kidney stones through access gained in your back    What to Expect During your Recovery and Home Care  Anesthesia Side Effects   You received general anesthesia today.  You may feel sleepy, tired, or have a sore throat.   You may also feel drowsiness, dizziness, or inability to think clearly.  For your safety, do not drive, drink alcoholic beverages, take any unprescribed medication or make any important decisions for 24 hours.  A responsible adult should be with you for 24 hours.        Activity and Recovery    No heavy lifting. Rest for the next 24 hours.     Pain Control  Unfortunately, you may experience pain after your procedure.  Frequency and urgency to urinate and mild discomfort are expected. Adequate pain management can include alternative measures to help ease your pain and that can include over the counter Tylenol/ibuprofen and a heating pad or Oxycodone which can be taken as prescribed as needed  for breakthrough pain. Do not take more than 4,000mg of Tylenol in a 24-hour period.      You may have also been prescribed Pyridium (for burning sensation) and Ditropan(for bladder spasms) for pain control. Pyridium will turn your urine bright orange.    Nausea/Vomiting   Clear liquids are best tolerated at first. Start slow, advance your diet as tolerated to normal foods. Avoid spicy, greasy, heavy foods at first. Also, you may feel nauseous or like you need to vomit if you take any type of medication on an empty stomach.  Call your physician if you are unable to eat or drink and have persistent vomiting.    Signs of Bleeding   You could have some blood in your urine off and on over the next several weeks. Your urine will be light pink to yellow.    Treatment/wound care:   It is okay to shower 24 hours after time of surgery. If you go home with a tube(s) in your back do not submerge them in water. (no tub bathing, or swimming)    Signs of Infection  Signs of infection can include fever, chills, burning sensation with passing of urine, or severe abdominal pain.  If you see any of these occur, please contact your doctor's office at [phone].  Any fever higher than 100.4, especially if associated with an ill feeling, abdominal pain, chills, or nausea should be reported to your surgeon.      Assist in bowel movements/urination  Increase fiber in diet  Increase water (6 to 8 glasses)  Increase walking   Urination should occur within 6 hours of anesthesia  If you have tried these methods and your bladder still feels full and you cannot use the bathroom, please go to your nearest Emergency room/contact your physician.    Additional Instructions:   Pieces of your kidney stone may pass in the urine for a few days and may cause some mild pain. You also had a stent placed today from your kidney down into your bladder. This helps keep the urinary tract open and prevents blockages of urine flow. The stent can be irritating  and can cause some frequency, urgency and blood in your urine when you go to the bathroom. It is important to drink plenty of water and take medications as prescribed. You may be sent home with Pyridium which turns your urine bright orange. Applying a heating pad to your back will also help with this kind of pain. It will be determined at your follow up appointment when the stent will be removed.     You may also go home with your nephrostomy tube (tube in your back). Dressings for this tube should be changed every three days. Clean around insertion site with alcohol wipe, cover with 4x4 gauze. Monitor for infection at insertion site with dressing change. Signs of infection include green/yellow drainage, swelling, warmth, redness, fever and chills.     Please restart your eliquis in 5 days

## 2025-01-03 ENCOUNTER — APPOINTMENT (OUTPATIENT)
Dept: RADIOLOGY | Facility: HOSPITAL | Age: 65
End: 2025-01-03
Payer: COMMERCIAL

## 2025-01-03 ENCOUNTER — HOSPITAL ENCOUNTER (OUTPATIENT)
Facility: HOSPITAL | Age: 65
Setting detail: OUTPATIENT SURGERY
Discharge: HOME | End: 2025-01-03
Attending: UROLOGY | Admitting: UROLOGY
Payer: COMMERCIAL

## 2025-01-03 ENCOUNTER — ANESTHESIA (OUTPATIENT)
Dept: OPERATING ROOM | Facility: HOSPITAL | Age: 65
End: 2025-01-03
Payer: COMMERCIAL

## 2025-01-03 ENCOUNTER — ANESTHESIA EVENT (OUTPATIENT)
Dept: OPERATING ROOM | Facility: HOSPITAL | Age: 65
End: 2025-01-03
Payer: COMMERCIAL

## 2025-01-03 ENCOUNTER — APPOINTMENT (OUTPATIENT)
Dept: HEMATOLOGY/ONCOLOGY | Facility: CLINIC | Age: 65
End: 2025-01-03
Payer: COMMERCIAL

## 2025-01-03 ENCOUNTER — PHARMACY VISIT (OUTPATIENT)
Dept: PHARMACY | Facility: CLINIC | Age: 65
End: 2025-01-03
Payer: COMMERCIAL

## 2025-01-03 VITALS
WEIGHT: 230 LBS | TEMPERATURE: 98.6 F | SYSTOLIC BLOOD PRESSURE: 145 MMHG | BODY MASS INDEX: 39.27 KG/M2 | DIASTOLIC BLOOD PRESSURE: 67 MMHG | HEIGHT: 64 IN | HEART RATE: 69 BPM | OXYGEN SATURATION: 99 % | RESPIRATION RATE: 16 BRPM

## 2025-01-03 DIAGNOSIS — N20.0 STAGHORN CALCULUS: ICD-10-CM

## 2025-01-03 LAB
GLUCOSE BLD MANUAL STRIP-MCNC: 179 MG/DL (ref 74–99)
GLUCOSE BLD MANUAL STRIP-MCNC: 191 MG/DL (ref 74–99)

## 2025-01-03 PROCEDURE — 82947 ASSAY GLUCOSE BLOOD QUANT: CPT

## 2025-01-03 PROCEDURE — 3700000002 HC GENERAL ANESTHESIA TIME - EACH INCREMENTAL 1 MINUTE: Performed by: UROLOGY

## 2025-01-03 PROCEDURE — 2500000004 HC RX 250 GENERAL PHARMACY W/ HCPCS (ALT 636 FOR OP/ED): Performed by: UROLOGY

## 2025-01-03 PROCEDURE — 7100000009 HC PHASE TWO TIME - INITIAL BASE CHARGE: Performed by: UROLOGY

## 2025-01-03 PROCEDURE — 2500000004 HC RX 250 GENERAL PHARMACY W/ HCPCS (ALT 636 FOR OP/ED): Performed by: ANESTHESIOLOGY

## 2025-01-03 PROCEDURE — 2500000005 HC RX 250 GENERAL PHARMACY W/O HCPCS: Performed by: UROLOGY

## 2025-01-03 PROCEDURE — 82365 CALCULUS SPECTROSCOPY: CPT | Performed by: UROLOGY

## 2025-01-03 PROCEDURE — 2500000001 HC RX 250 WO HCPCS SELF ADMINISTERED DRUGS (ALT 637 FOR MEDICARE OP): Performed by: UROLOGY

## 2025-01-03 PROCEDURE — 3600000008 HC OR TIME - EACH INCREMENTAL 1 MINUTE - PROCEDURE LEVEL THREE: Performed by: UROLOGY

## 2025-01-03 PROCEDURE — 50081 PERQ NL/PL LITHOTRP CPLX>2CM: CPT | Performed by: UROLOGY

## 2025-01-03 PROCEDURE — 74420 UROGRAPHY RTRGR +-KUB: CPT | Performed by: UROLOGY

## 2025-01-03 PROCEDURE — 7100000010 HC PHASE TWO TIME - EACH INCREMENTAL 1 MINUTE: Performed by: UROLOGY

## 2025-01-03 PROCEDURE — 2550000001 HC RX 255 CONTRASTS: Performed by: UROLOGY

## 2025-01-03 PROCEDURE — 7100000001 HC RECOVERY ROOM TIME - INITIAL BASE CHARGE: Performed by: UROLOGY

## 2025-01-03 PROCEDURE — 7100000002 HC RECOVERY ROOM TIME - EACH INCREMENTAL 1 MINUTE: Performed by: UROLOGY

## 2025-01-03 PROCEDURE — 2500000005 HC RX 250 GENERAL PHARMACY W/O HCPCS: Performed by: ANESTHESIOLOGIST ASSISTANT

## 2025-01-03 PROCEDURE — C1894 INTRO/SHEATH, NON-LASER: HCPCS | Performed by: UROLOGY

## 2025-01-03 PROCEDURE — C2617 STENT, NON-COR, TEM W/O DEL: HCPCS | Performed by: UROLOGY

## 2025-01-03 PROCEDURE — 2500000004 HC RX 250 GENERAL PHARMACY W/ HCPCS (ALT 636 FOR OP/ED): Performed by: ANESTHESIOLOGIST ASSISTANT

## 2025-01-03 PROCEDURE — 2780000003 HC OR 278 NO HCPCS: Performed by: UROLOGY

## 2025-01-03 PROCEDURE — RXMED WILLOW AMBULATORY MEDICATION CHARGE

## 2025-01-03 PROCEDURE — 50437 DILAT XST TRC NEW ACCESS RCS: CPT | Performed by: UROLOGY

## 2025-01-03 PROCEDURE — 3700000001 HC GENERAL ANESTHESIA TIME - INITIAL BASE CHARGE: Performed by: UROLOGY

## 2025-01-03 PROCEDURE — 2500000001 HC RX 250 WO HCPCS SELF ADMINISTERED DRUGS (ALT 637 FOR MEDICARE OP): Performed by: ANESTHESIOLOGY

## 2025-01-03 PROCEDURE — 3600000003 HC OR TIME - INITIAL BASE CHARGE - PROCEDURE LEVEL THREE: Performed by: UROLOGY

## 2025-01-03 PROCEDURE — 2720000007 HC OR 272 NO HCPCS: Performed by: UROLOGY

## 2025-01-03 PROCEDURE — 2500000005 HC RX 250 GENERAL PHARMACY W/O HCPCS: Performed by: ANESTHESIOLOGY

## 2025-01-03 PROCEDURE — C1769 GUIDE WIRE: HCPCS | Performed by: UROLOGY

## 2025-01-03 DEVICE — URETERAL STENT
Type: IMPLANTABLE DEVICE | Site: URETER | Status: FUNCTIONAL
Brand: CONTOUR™

## 2025-01-03 RX ORDER — PROPOFOL 10 MG/ML
INJECTION, EMULSION INTRAVENOUS AS NEEDED
Status: DISCONTINUED | OUTPATIENT
Start: 2025-01-03 | End: 2025-01-03

## 2025-01-03 RX ORDER — ACETAMINOPHEN 325 MG/1
975 TABLET ORAL ONCE
Status: DISCONTINUED | OUTPATIENT
Start: 2025-01-03 | End: 2025-01-03 | Stop reason: HOSPADM

## 2025-01-03 RX ORDER — SODIUM CHLORIDE 0.9 G/100ML
IRRIGANT IRRIGATION AS NEEDED
Status: DISCONTINUED | OUTPATIENT
Start: 2025-01-03 | End: 2025-01-03 | Stop reason: HOSPADM

## 2025-01-03 RX ORDER — ONDANSETRON HYDROCHLORIDE 2 MG/ML
4 INJECTION, SOLUTION INTRAVENOUS ONCE AS NEEDED
Status: DISCONTINUED | OUTPATIENT
Start: 2025-01-03 | End: 2025-01-03 | Stop reason: HOSPADM

## 2025-01-03 RX ORDER — SCOPOLAMINE 1 MG/3D
PATCH, EXTENDED RELEASE TRANSDERMAL AS NEEDED
Status: DISCONTINUED | OUTPATIENT
Start: 2025-01-03 | End: 2025-01-03

## 2025-01-03 RX ORDER — CIPROFLOXACIN 2 MG/ML
400 INJECTION, SOLUTION INTRAVENOUS ONCE
Status: COMPLETED | OUTPATIENT
Start: 2025-01-03 | End: 2025-01-03

## 2025-01-03 RX ORDER — PHENYLEPHRINE 10 MG/250 ML(40 MCG/ML)IN 0.9 % SOD.CHLORIDE INTRAVENOUS
CONTINUOUS PRN
Status: DISCONTINUED | OUTPATIENT
Start: 2025-01-03 | End: 2025-01-03

## 2025-01-03 RX ORDER — HYDROMORPHONE HYDROCHLORIDE 0.2 MG/ML
0.1 INJECTION INTRAMUSCULAR; INTRAVENOUS; SUBCUTANEOUS EVERY 5 MIN PRN
Status: DISCONTINUED | OUTPATIENT
Start: 2025-01-03 | End: 2025-01-03 | Stop reason: HOSPADM

## 2025-01-03 RX ORDER — TRANEXAMIC ACID 100 MG/ML
INJECTION, SOLUTION INTRAVENOUS AS NEEDED
Status: DISCONTINUED | OUTPATIENT
Start: 2025-01-03 | End: 2025-01-03

## 2025-01-03 RX ORDER — FUROSEMIDE 10 MG/ML
INJECTION INTRAMUSCULAR; INTRAVENOUS AS NEEDED
Status: DISCONTINUED | OUTPATIENT
Start: 2025-01-03 | End: 2025-01-03

## 2025-01-03 RX ORDER — ALBUTEROL SULFATE 0.83 MG/ML
2.5 SOLUTION RESPIRATORY (INHALATION) ONCE AS NEEDED
Status: DISCONTINUED | OUTPATIENT
Start: 2025-01-03 | End: 2025-01-03 | Stop reason: HOSPADM

## 2025-01-03 RX ORDER — FENTANYL CITRATE 50 UG/ML
INJECTION, SOLUTION INTRAMUSCULAR; INTRAVENOUS AS NEEDED
Status: DISCONTINUED | OUTPATIENT
Start: 2025-01-03 | End: 2025-01-03

## 2025-01-03 RX ORDER — PHENAZOPYRIDINE HYDROCHLORIDE 100 MG/1
200 TABLET, FILM COATED ORAL ONCE AS NEEDED
Status: DISCONTINUED | OUTPATIENT
Start: 2025-01-03 | End: 2025-01-03 | Stop reason: HOSPADM

## 2025-01-03 RX ORDER — PHENYLEPHRINE HCL IN 0.9% NACL 1 MG/10 ML
SYRINGE (ML) INTRAVENOUS AS NEEDED
Status: DISCONTINUED | OUTPATIENT
Start: 2025-01-03 | End: 2025-01-03

## 2025-01-03 RX ORDER — OXYCODONE HYDROCHLORIDE 10 MG/1
10 TABLET ORAL EVERY 4 HOURS PRN
Status: DISCONTINUED | OUTPATIENT
Start: 2025-01-03 | End: 2025-01-03 | Stop reason: HOSPADM

## 2025-01-03 RX ORDER — BUPIVACAINE HYDROCHLORIDE 5 MG/ML
INJECTION, SOLUTION PERINEURAL AS NEEDED
Status: DISCONTINUED | OUTPATIENT
Start: 2025-01-03 | End: 2025-01-03 | Stop reason: HOSPADM

## 2025-01-03 RX ORDER — NEOSTIGMINE METHYLSULFATE 1 MG/ML
INJECTION INTRAVENOUS AS NEEDED
Status: DISCONTINUED | OUTPATIENT
Start: 2025-01-03 | End: 2025-01-03

## 2025-01-03 RX ORDER — LIDOCAINE HYDROCHLORIDE 20 MG/ML
INJECTION, SOLUTION EPIDURAL; INFILTRATION; INTRACAUDAL; PERINEURAL AS NEEDED
Status: DISCONTINUED | OUTPATIENT
Start: 2025-01-03 | End: 2025-01-03

## 2025-01-03 RX ORDER — METOPROLOL TARTRATE 1 MG/ML
2.5 INJECTION, SOLUTION INTRAVENOUS
Status: DISCONTINUED | OUTPATIENT
Start: 2025-01-03 | End: 2025-01-03 | Stop reason: HOSPADM

## 2025-01-03 RX ORDER — SODIUM CHLORIDE, SODIUM LACTATE, POTASSIUM CHLORIDE, CALCIUM CHLORIDE 600; 310; 30; 20 MG/100ML; MG/100ML; MG/100ML; MG/100ML
125 INJECTION, SOLUTION INTRAVENOUS CONTINUOUS
Status: ACTIVE | OUTPATIENT
Start: 2025-01-03 | End: 2025-01-03

## 2025-01-03 RX ORDER — HYDROMORPHONE HYDROCHLORIDE 1 MG/ML
1 INJECTION, SOLUTION INTRAMUSCULAR; INTRAVENOUS; SUBCUTANEOUS EVERY 5 MIN PRN
Status: DISCONTINUED | OUTPATIENT
Start: 2025-01-03 | End: 2025-01-03 | Stop reason: HOSPADM

## 2025-01-03 RX ORDER — ACETAMINOPHEN 325 MG/1
650 TABLET ORAL EVERY 4 HOURS PRN
Status: DISCONTINUED | OUTPATIENT
Start: 2025-01-03 | End: 2025-01-03 | Stop reason: HOSPADM

## 2025-01-03 RX ORDER — SENNOSIDES 8.6 MG/1
1 TABLET ORAL DAILY
Qty: 7 TABLET | Refills: 0 | Status: SHIPPED | OUTPATIENT
Start: 2025-01-03 | End: 2025-01-10

## 2025-01-03 RX ORDER — ACETAMINOPHEN 325 MG/1
975 TABLET ORAL ONCE
Status: COMPLETED | OUTPATIENT
Start: 2025-01-03 | End: 2025-01-03

## 2025-01-03 RX ORDER — MIDAZOLAM HYDROCHLORIDE 1 MG/ML
1 INJECTION, SOLUTION INTRAMUSCULAR; INTRAVENOUS ONCE AS NEEDED
Status: DISCONTINUED | OUTPATIENT
Start: 2025-01-03 | End: 2025-01-03 | Stop reason: HOSPADM

## 2025-01-03 RX ORDER — HYDRALAZINE HYDROCHLORIDE 20 MG/ML
10 INJECTION INTRAMUSCULAR; INTRAVENOUS EVERY 30 MIN PRN
Status: DISCONTINUED | OUTPATIENT
Start: 2025-01-03 | End: 2025-01-03 | Stop reason: HOSPADM

## 2025-01-03 RX ORDER — SODIUM CHLORIDE, SODIUM LACTATE, POTASSIUM CHLORIDE, CALCIUM CHLORIDE 600; 310; 30; 20 MG/100ML; MG/100ML; MG/100ML; MG/100ML
INJECTION, SOLUTION INTRAVENOUS CONTINUOUS PRN
Status: DISCONTINUED | OUTPATIENT
Start: 2025-01-03 | End: 2025-01-03

## 2025-01-03 RX ORDER — ONDANSETRON HYDROCHLORIDE 2 MG/ML
INJECTION, SOLUTION INTRAVENOUS AS NEEDED
Status: DISCONTINUED | OUTPATIENT
Start: 2025-01-03 | End: 2025-01-03

## 2025-01-03 RX ORDER — CEPHALEXIN 500 MG/1
500 CAPSULE ORAL 4 TIMES DAILY
Qty: 12 CAPSULE | Refills: 0 | Status: SHIPPED | OUTPATIENT
Start: 2025-01-03 | End: 2025-01-06

## 2025-01-03 RX ORDER — OXYCODONE HYDROCHLORIDE 5 MG/1
5 TABLET ORAL EVERY 6 HOURS PRN
Qty: 8 TABLET | Refills: 0 | Status: SHIPPED | OUTPATIENT
Start: 2025-01-03 | End: 2025-01-05

## 2025-01-03 RX ORDER — GLYCOPYRROLATE 0.2 MG/ML
INJECTION INTRAMUSCULAR; INTRAVENOUS AS NEEDED
Status: DISCONTINUED | OUTPATIENT
Start: 2025-01-03 | End: 2025-01-03

## 2025-01-03 RX ORDER — FAMOTIDINE 10 MG/ML
INJECTION INTRAVENOUS AS NEEDED
Status: DISCONTINUED | OUTPATIENT
Start: 2025-01-03 | End: 2025-01-03

## 2025-01-03 RX ORDER — MIDAZOLAM HYDROCHLORIDE 1 MG/ML
INJECTION, SOLUTION INTRAMUSCULAR; INTRAVENOUS AS NEEDED
Status: DISCONTINUED | OUTPATIENT
Start: 2025-01-03 | End: 2025-01-03

## 2025-01-03 RX ORDER — SUCCINYLCHOLINE/SOD CL,ISO/PF 100 MG/5ML
SYRINGE (ML) INTRAVENOUS AS NEEDED
Status: DISCONTINUED | OUTPATIENT
Start: 2025-01-03 | End: 2025-01-03

## 2025-01-03 RX ORDER — OXYCODONE AND ACETAMINOPHEN 5; 325 MG/1; MG/1
1 TABLET ORAL EVERY 4 HOURS PRN
Status: DISCONTINUED | OUTPATIENT
Start: 2025-01-03 | End: 2025-01-03 | Stop reason: HOSPADM

## 2025-01-03 RX ORDER — ROCURONIUM BROMIDE 50 MG/5 ML
SYRINGE (ML) INTRAVENOUS AS NEEDED
Status: DISCONTINUED | OUTPATIENT
Start: 2025-01-03 | End: 2025-01-03

## 2025-01-03 RX ADMIN — FENTANYL CITRATE 50 MCG: 50 INJECTION, SOLUTION INTRAMUSCULAR; INTRAVENOUS at 13:35

## 2025-01-03 RX ADMIN — CIPROFLOXACIN 400 MG: 400 INJECTION, SOLUTION INTRAVENOUS at 11:52

## 2025-01-03 RX ADMIN — SODIUM CHLORIDE, POTASSIUM CHLORIDE, SODIUM LACTATE AND CALCIUM CHLORIDE: 600; 310; 30; 20 INJECTION, SOLUTION INTRAVENOUS at 11:44

## 2025-01-03 RX ADMIN — Medication 10 MG: at 13:01

## 2025-01-03 RX ADMIN — Medication 160 MG: at 11:48

## 2025-01-03 RX ADMIN — Medication 100 MCG: at 12:09

## 2025-01-03 RX ADMIN — PROPOFOL 200 MG: 10 INJECTION, EMULSION INTRAVENOUS at 11:48

## 2025-01-03 RX ADMIN — PROMETHAZINE HYDROCHLORIDE 12.5 MG: 25 INJECTION INTRAMUSCULAR; INTRAVENOUS at 15:12

## 2025-01-03 RX ADMIN — ONDANSETRON 4 MG: 2 INJECTION INTRAMUSCULAR; INTRAVENOUS at 13:19

## 2025-01-03 RX ADMIN — FENTANYL CITRATE 50 MCG: 50 INJECTION, SOLUTION INTRAMUSCULAR; INTRAVENOUS at 12:24

## 2025-01-03 RX ADMIN — ONDANSETRON 4 MG: 2 INJECTION INTRAMUSCULAR; INTRAVENOUS at 11:43

## 2025-01-03 RX ADMIN — ACETAMINOPHEN 975 MG: 325 TABLET ORAL at 10:17

## 2025-01-03 RX ADMIN — Medication 20 MG: at 12:18

## 2025-01-03 RX ADMIN — LIDOCAINE HYDROCHLORIDE 60 MG: 20 INJECTION, SOLUTION EPIDURAL; INFILTRATION; INTRACAUDAL; PERINEURAL at 11:48

## 2025-01-03 RX ADMIN — FUROSEMIDE 20 MG: 10 INJECTION, SOLUTION INTRAMUSCULAR; INTRAVENOUS at 13:19

## 2025-01-03 RX ADMIN — PROMETHAZINE HYDROCHLORIDE 12.5 MG: 25 INJECTION INTRAMUSCULAR; INTRAVENOUS at 14:07

## 2025-01-03 RX ADMIN — DEXAMETHASONE SODIUM PHOSPHATE 4 MG: 4 INJECTION, SOLUTION INTRAMUSCULAR; INTRAVENOUS at 11:52

## 2025-01-03 RX ADMIN — Medication 40 MG: at 11:55

## 2025-01-03 RX ADMIN — VANCOMYCIN HYDROCHLORIDE 1500 MG: 1.5 INJECTION, POWDER, LYOPHILIZED, FOR SOLUTION INTRAVENOUS at 10:19

## 2025-01-03 RX ADMIN — Medication 6 L/MIN: at 13:52

## 2025-01-03 RX ADMIN — MIDAZOLAM 2 MG: 1 INJECTION INTRAMUSCULAR; INTRAVENOUS at 11:44

## 2025-01-03 RX ADMIN — Medication 0.2 MCG/KG/MIN: at 12:31

## 2025-01-03 RX ADMIN — TRANEXAMIC ACID 1000 MG: 1 INJECTION, SOLUTION INTRAVENOUS at 13:31

## 2025-01-03 RX ADMIN — FAMOTIDINE 20 MG: 10 INJECTION, SOLUTION INTRAVENOUS at 11:53

## 2025-01-03 RX ADMIN — Medication 10 MG: at 11:48

## 2025-01-03 RX ADMIN — GLYCOPYRROLATE 1 MG: 0.2 INJECTION, SOLUTION INTRAMUSCULAR; INTRAVENOUS at 13:31

## 2025-01-03 RX ADMIN — SCOLOPAMINE TRANSDERMAL SYSTEM 1 PATCH: 1 PATCH, EXTENDED RELEASE TRANSDERMAL at 11:43

## 2025-01-03 RX ADMIN — PHENAZOPYRIDINE 200 MG: 100 TABLET ORAL at 16:55

## 2025-01-03 RX ADMIN — NEOSTIGMINE METHYLSULFATE 5 MG: 1 INJECTION INTRAVENOUS at 13:31

## 2025-01-03 SDOH — HEALTH STABILITY: MENTAL HEALTH: CURRENT SMOKER: 0

## 2025-01-03 ASSESSMENT — PAIN SCALES - GENERAL
PAINLEVEL_OUTOF10: 1
PAINLEVEL_OUTOF10: 0 - NO PAIN
PAINLEVEL_OUTOF10: 3
PAINLEVEL_OUTOF10: 0 - NO PAIN
PAIN_LEVEL: 0

## 2025-01-03 ASSESSMENT — PAIN DESCRIPTION - DESCRIPTORS
DESCRIPTORS: CRAMPING
DESCRIPTORS: CRAMPING

## 2025-01-03 NOTE — OP NOTE
Nephrolithotomy - miniPCNL W/HOLMIUM LASER--RIGHT (R) Operative Note     Date: 1/3/2025  OR Location: [facility] OR    Name: Savi Govea, : 1960, Age: 64 y.o., MRN: [id number], Sex: female    Diagnosis  Pre-op Diagnosis      * Staghorn calculus [N20.0] Post-op Diagnosis     * Staghorn calculus [N20.0]     Procedures  Nephrolithotomy - miniPCNL W/HOLMIUM LASER--RIGHT  43422 - FL PERQ NL/PL LITHOTRP COMPLEX >2 CM MLT LOCATIONS      Surgeons      * Sunny Ordonez - Primary    Resident/Fellow/Other Assistant:  Surgeons and Role:     * Inez Paulson MD - Assisting    Staff:   Surgical Assistant:   Circulator: Yadi Garcia Person: Hue Garcia Person: Cynthia Travis Circulator: Talia    Anesthesia Staff: Anesthesiologist: Caroline Rios MD  C-AA: JAROCHO Anaya    Procedure Summary  Anesthesia: General  ASA: ASA status not filed in the log.  Estimated Blood Loss: 15mL  Intra-op Medications:   Administrations occurring from 1125 to 1350 on 25:   Medication Name Total Dose   BUPivacaine HCl (Marcaine) 0.5 % (5 mg/mL) injection 30 mL   iohexol (OMNIPaque) 300 mg iodine/mL solution 50 mL   sodium chloride 0.9 % irrigation solution 3,000 mL   ciprofloxacin (Cipro) 400 mg in dextrose 5%  mL 400 mg   dexAMETHasone (Decadron) injection 4 mg/mL 4 mg   famotidine PF (Pepcid) injection 20 mg   fentaNYL (Sublimaze) injection 50 mcg/mL 100 mcg   furosemide (Lasix) injection 20 mg   glycopyrrolate (Robinul) injection 1 mg   LR infusion Cannot be calculated   lidocaine PF (Xylocaine-MPF) local injection 2 % 60 mg   midazolam (Versed) injection 1 mg/mL 2 mg   neostigmine (Bloxiverz) 10 mg/10 mL injection 5 mg   ondansetron (Zofran) 2 mg/mL injection 8 mg   phenylephrine (Matthieu-Synephrine) 10 mg/250 mL NS (40 mcg/mL) infusion 1.47 mg   phenylephrine 100 mcg/mL syringe 10 mL (prefilled) 100 mcg   propofol (Diprivan) injection 10 mg/mL 200 mg   rocuronium (Zemuron) 50 mg/5 mL prefilled syringe 80 mg   scopolamine  (Transderm-Scop) 1 mg/3 days patch 1 patch   succinylcholine PF in sodium chloride IV syringe 160 mg   tranexamic acid (Cyklokapron) injection 1,000 mg              Anesthesia Record               Intraprocedure I/O Totals          Intake    Tranexamic Acid 10.00 mL    The total shown is the total volume documented since Anesthesia Start was filed.    Phenylephrine Drip 36.77 mL    The total shown is the total volume documented since Anesthesia Start was filed.    LR infusion 500.00 mL    ciprofloxacin (Cipro) 400 mg in dextrose 5%  mL 200.00 mL    Total Intake 746.77 mL          Specimen:   ID Type Source Tests Collected by Time   A : right renal stones Calculus Urine, Clean Catch CALCULI (STONE) ANALYSIS Sunny Ordonez MD 1/3/2025 1220                 Drains and/or Catheters:   Urethral Catheter Non-latex 16 Fr. (Active)       Tourniquet Times:         Implants:  Implants       Type Name Action Serial No.      Stent STENT, URETERAL CONTOUR, 6FR X 26CM - [id number] Implanted               Findings: large lower pole branching staghorn stone with 1 additional stone, total amount measuring >2.5cm    Indications: Savi Govea is an 64 y.o. female who is having surgery for Staghorn calculus [N20.0].     The patient was seen in the preoperative area. The risks, benefits, complications, treatment options, non-operative alternatives, expected recovery and outcomes were discussed with the patient. The possibilities of reaction to medication, pulmonary aspiration, injury to surrounding structures, bleeding, recurrent infection, the need for additional procedures, failure to diagnose a condition, and creating a complication requiring transfusion or operation were discussed with the patient. The patient concurred with the proposed plan, giving informed consent.  The site of surgery was properly noted/marked if necessary per policy. The patient has been actively warmed in preoperative area. Preoperative antibiotics  have been ordered and given within 1 hours of incision. Venous thrombosis prophylaxis have been ordered including bilateral sequential compression devices    Procedure Details:     Operative indication: 64-year-old female with history of large right renal stone who presents for right mini-PCNL. After discussion of risks, benefits, alternatives she agrees to undergo right PCNL for definitive stone management.      OPERATIVE REPORT:   The patient was identified in the perioperative area.  They were informed of the risks and benefits of the procedure and consented as appropriate.  They were wheeled to the operating room where a time-out was performed using 2 patient identifiers.  They were then induced into general endotracheal anesthesia.   Antibiotic prophylaxis were administered (2g Ancef).     On hospital [facility] patient was placed in frog-leg position.  She was prepped and draped in typical sterile fashion.  We performed cystoscopy and identified the right ureteral orifice, and placed a sensor wire up to the level of the kidney.  The scope was removed over the sensor wire and a 5 Nepalese open-ended catheter was placed at the level of the kidney, wire removed. 5Fr catheter was secured to the patient's leg.    The patient was then repositioned into prone position, and we then prepped and draped in the usual sterile fashion both her right flank/back and perineum/vulva/urethra/catheters.     From below a sensor wire was placed to the level of the kidney through the 5 Nepalese catheter, confirmed with fluoroscopy.  Retrograde pyelogram was performed showing no hydronephrosis. An 8/10 dilator was placed over the sensor wire and a super stiff wire was introduced to the to the level of the kidney as confirmed with fluoroscopy.  Sensor wire secured as safety.  11/13 ureteral access sheath placed to the level of the proximal ureter, inner sheath and Super Stiff wire removed.  With the FrenchWeb flexible ureteroscope was then  utilized to perform pan pyeloscopy. A large brown branching lower pole stone was noted with an additional stone, with a total stone burden of >2.5cm. Ureteroscope was used to identify a midpole calyx in which we wanted to obtain percutaneous access.    Landmarks were marked on the patient's back including 12th rib, mid axillary line, iliac crest.  Using a Christie we triangulated along the skin to the calyx we would like to obtain access to.  Once we are happy with the trajectory of our landing zone, we utilized a percutaneous access needle to fluoroscopically guided needle into the midpole calyx.  Fluoroscopic guidance utilized via AP and oblique 30 degree views.  Upon removing the inner needle of the access needle, clear urine was seen flowing through the the needle guide.  At this time we placed a sensor wire into the kidney.  An incision was made about 1 inch in size to accommodate our sheath and following this, the 8/10 dilator was then advanced. Through the 10 Czech sheath we placed a superstiff wire and secured our sensor wire as our safety wire.    Following this, we advanced our metal mini-PCNL dilator over our working wire. Placement was confirmed with direct visualization. Once satisfied with its position, we placed our outer sheath over the dilator.     The mini-nephroscope was then inserted and pyeloscopy was performed, confirming stones noted above. A 500 micron laser was used to fragment the stone into several small pieces. The stone fragments were then removed with the mini-nephroscope via the bernoulli effect.     We then repeated pyeloscopy using both the ureteroscope and the nephroscope. Any residual stones were removed with either the nephroscope or a 2.9Fr zerotip basket through the ureteroscope. Once we were satisfied with our pyeloscopy, the ureteroscope was slowly removed under direct vision. We removed some additional fragments with the basket that were found in the ureter and after this, the  rest of the ureter was stone free.     At this point, we then elected to place a stent.  Over the sensor wire through the urethra we placed a 6 x 26 JJ stent with the proximal and distal end verified via fluoroscopy.  We closely inspected our PCNL tract for hemostasis. She did have some mild oozing that would likely resolve with manual pressure, therefore no fulguration was performed. Over our safety wire, an antegrade pyelogram was obtained through a 5Fr catheter showing no contrast extravasation. We then held manual pressure over our incision to tamponade any residual retroperitoneal bleeding.  Skin incision was then closed with 4-0 monocryl. The dressing was then applied to the skin. She was repositioned in the supine position, extubated, and taken to PACU in stable condition. Of note, urine was fruit punch and clear in the singh catheter upon taking patient to PACU.     Disposition: Patient will be observed in PACU, plan for discharge today. Pt to restart her eliquis in 5 days.       Complications:  None; patient tolerated the procedure well.    Disposition: PACU - hemodynamically stable.  Condition: stable           Additional Details:     Attending Attestation: I was present and scrubbed for the entire procedure.    Sunny Ordonez  Phone Number: [phone]

## 2025-01-03 NOTE — ANESTHESIA PROCEDURE NOTES
Airway  Date/Time: 1/3/2025 11:50 AM  Urgency: elective    Airway not difficult    Staffing  Performed: JAROCHO   Authorized by: Caroline Rios MD    Performed by: JAROCHO Anaya  Patient location during procedure: OR    Indications and Patient Condition  Indications for airway management: anesthesia  Spontaneous ventilation: present  Preoxygenated: yes  Patient position: sniffing  Mask difficulty assessment: 1 - vent by mask    Final Airway Details  Final airway type: endotracheal airway      Successful airway: ETT     Successful intubation technique: video laryngoscopy (maier 3)  Facilitating devices/methods: intubating stylet  Endotracheal tube insertion site: oral  Blade: Phil  Blade size: #3  ETT size (mm): 7.0  Cormack-Lehane Classification: grade I - full view of glottis  Placement verified by: chest auscultation and capnometry   Measured from: lips  ETT to lips (cm): 23  Number of attempts at approach: 1

## 2025-01-03 NOTE — ANESTHESIA POSTPROCEDURE EVALUATION
Patient: Savi Govea    Procedure Summary       Date: 01/03/25 Room / Location: [facility] 02 / [facility]    Anesthesia Start: 1144 Anesthesia Stop: 1355    Procedure: Nephrolithotomy - miniPCNL W/HOLMIUM LASER--RIGHT (Right) Diagnosis:       Staghorn calculus      (Staghorn calculus [N20.0])    Surgeons: Sunny Ordonez MD Responsible Provider: Caroline Rios MD    Anesthesia Type: general ASA Status: 3            Anesthesia Type: general    Vitals Value Taken Time   /68 01/03/25 1415   Temp 36.2 °C (97.2 °F) 01/03/25 1354   Pulse 60 01/03/25 1428   Resp 18 01/03/25 1428   SpO2 93 % 01/03/25 1428   Vitals shown include unfiled device data.    Anesthesia Post Evaluation    Patient location during evaluation: PACU  Patient participation: complete - patient participated  Level of consciousness: sleepy but conscious, responsive to verbal stimuli, responsive to light touch, responsive to physical stimuli and sedated  Pain score: 0  Pain management: satisfactory to patient  Multimodal analgesia pain management approach  Airway patency: patent  Two or more strategies used to mitigate risk of obstructive sleep apnea  Cardiovascular status: acceptable, hemodynamically stable and stable  Respiratory status: acceptable, unassisted, spontaneous ventilation and nonlabored ventilation  Hydration status: acceptable  Postoperative Nausea and Vomiting: moderate  Comments: Requires Phenergan for PONV        No notable events documented.

## 2025-01-03 NOTE — ANESTHESIA PREPROCEDURE EVALUATION
Patient: Savi Govea    Procedure Information       Anesthesia Start Date/Time: 01/03/25 1144    Procedure: Nephrolithotomy - miniPCNL W/HOLMIUM LASER--RIGHT (Right) - Needs miniPCNL scope set    Location: [facility] OR 02 / [facility]    Surgeons: Sunny Ordonez MD            Relevant Problems   Cardiac   (+) Essential hypertension   (+) Hyperlipidemia      Neuro   (+) Acute lumbar radiculopathy   (+) Cervical neuritis   (+) Left cervical radiculopathy      GI   (+) Crohn's disease of colon with complication (Multi)      /Renal   (+) Staghorn calculus      Endocrine   (+) Obesity   (+) Type 2 diabetes mellitus without complication, without long-term current use of insulin (Multi)      Hematology   (+) Factor V Leiden carrier (Multi)      Musculoskeletal   (+) Cervical spondylosis with radiculopathy   (+) Osteoarthritis of left hip   (+) Osteoarthritis, knee   (+) Primary osteoarthritis of right knee   (+) Unilateral primary osteoarthritis, left knee       Clinical information reviewed:   Tobacco  Allergies  Meds  Problems  Med Hx  Surg Hx   Fam Hx  Soc   Hx        NPO Detail:  NPO/Void Status  Carbohydrate Drink Given Prior to Surgery? : N  Date of Last Liquid: 01/03/25  Time of Last Liquid: 0730  Date of Last Solid: 01/02/25  Time of Last Solid: 1900  Last Intake Type: Clear fluids  Time of Last Void: 1047         Physical Exam    Airway  Mallampati: III  TM distance: >3 FB  Neck ROM: full     Cardiovascular - normal exam  Rhythm: regular  Rate: normal     Dental - normal exam     Pulmonary - normal exam  Breath sounds clear to auscultation     Abdominal   (+) obese  Abdomen: soft  Bowel sounds: normal           Anesthesia Plan    History of general anesthesia?: yes  History of complications of general anesthesia?: no    ASA 3     general     The patient is not a current smoker.  Patient was previously instructed to abstain from smoking on day of procedure.  Patient did not smoke on day of  procedure.  Education provided regarding risk of obstructive sleep apnea.  intravenous induction   Postoperative administration of opioids is intended.  Anesthetic plan and risks discussed with patient.  Use of blood products discussed with patient who consented to blood products.    Plan discussed with CAA.

## 2025-01-05 NOTE — H&P
History Of Present Illness  65yo f here for r pcnl, no recent change in health    Past Medical History  Past Medical History:   Diagnosis Date    Abnormal weight gain     Weight gain, abnormal    Arrhythmia     Arthritis     Cervical disc disorder     Chronic diarrhea     COVID-19     VACCINATED    Crohn's disease (Multi)     Diabetes mellitus (Multi)     Diverticulosis     History of blood clot in brain     Hyperlipidemia     Hypertension     Irregular heart beat     Irritable bowel syndrome     Joint pain     Low back pain     Lumbosacral disc disease     Nephrolithiasis     Neuropathy in diabetes (Multi)     Numbness     Other abnormal glucose     Abnormal glucose    Other conditions influencing health status     Neck sprain and strain    Personal history of other diseases of the circulatory system     History of hypertension    Personal history of other diseases of the musculoskeletal system and connective tissue     History of degenerative disc disease    Radiculopathy, lumbosacral region     Lumbosacral neuritis    Seasonal allergies     Sprain of ligaments of thoracic spine, initial encounter     Thoracic sprain and strain    Type 2 diabetes mellitus     Urinary tract infection     Wears glasses         Surgical History  Past Surgical History:   Procedure Laterality Date    CARPAL TUNNEL RELEASE      DILATION AND CURETTAGE OF UTERUS      ENDOMETRIAL ABLATION      HAND SURGERY  03/18/2015    Hand Surgery                                                                                                                                                          JOINT REPLACEMENT Bilateral     bilateral knees    KNEE ARTHROSCOPY W/ DEBRIDEMENT  03/18/2015    Arthroscopy Knee Right    LIPOMA RESECTION Left     left arm    TONSILLECTOMY  03/18/2015    Tonsillectomy    TUBAL LIGATION  03/18/2015    Tubal Ligation        Social History  She reports that she quit smoking about 20 years ago. Her smoking use included  "cigarettes. She started smoking about 30 years ago. She has a 5 pack-year smoking history. She has never used smokeless tobacco. She reports that she does not currently use alcohol. She reports that she does not use drugs.    Family History  Family History   Problem Relation Name Age of Onset    Uterine cancer Mother Cervical cancer     Cancer Mother Cervical cancer     Hypertension Father Derek     Heart attack Father Derek     Thyroid cancer Sister Thyroid     Cancer Sister Thyroid     Diabetes Brother Alirio     Graves' disease Brother Alirio     Multiple sclerosis Brother Alirio     Cancer Brother Eye cancer     Cancer Brother Abdominal cancer     Diabetes Maternal Grandmother      Skin cancer Maternal Grandmother      Hypertension Maternal Grandfather Edward     Diabetes Paternal Grandfather Jeferson     Hypertension Paternal Grandfather Jeferson     Stroke Mother's Sister Lizzette         Allergies  Lactose, Aspirin, Caffeine, Cefditoren, Cetirizine, Fexofenadine, Phenylephrine-guaifenesin, Pseudoephedrine, Pseudoephedrine-guaifenesin, Salicylates, and Spectracef [cefditoren pivoxil]    ROS: 12 system review was completed and is negative with the exception of those signs and symptoms noted in the history of present illness: A 12 system review was completed and is negative with the exception of those signs and symptoms noted in the history of present illness.     Exam:  General: in NAD, appears stated age  Head: normocephalic, atraumatic  Respiratory: normal effort, no use of accessory muscles  Cardiovascular: no edema noted  Skin: normal turgor, no rashes  Neurologic: grossly intact, oriented to person/place/time  Psychiatric: mode and affect appropriate    Per anesthesiology, clear to auscultation bilaterally with a regular rate and rhythm     Last Recorded Vitals  /67   Pulse 69   Temp 37 °C (98.6 °F) (Temporal)   Resp 16   Ht 1.626 m (5' 4\")   Wt 104 kg (230 lb)   SpO2 99%   BMI 39.48 kg/m²       No " "results found for: \"URINECULTURE\", \"CREATININE\"      ASSESSMENT/PLAN:  Okay to proceed with r pcnl for nephrolithiasis      Sunny Ordonez MD    "

## 2025-01-07 LAB
APPEARANCE STONE: NORMAL
COMPN STONE: NORMAL
SPECIMEN WT: 1818 MG

## 2025-01-13 ENCOUNTER — APPOINTMENT (OUTPATIENT)
Dept: PREADMISSION TESTING | Facility: HOSPITAL | Age: 65
End: 2025-01-13
Payer: COMMERCIAL

## 2025-01-22 ENCOUNTER — HOSPITAL ENCOUNTER (OUTPATIENT)
Facility: HOSPITAL | Age: 65
Setting detail: OUTPATIENT SURGERY
Discharge: HOME | End: 2025-01-22
Attending: UROLOGY | Admitting: UROLOGY
Payer: COMMERCIAL

## 2025-01-22 ENCOUNTER — ANESTHESIA (OUTPATIENT)
Dept: OPERATING ROOM | Facility: HOSPITAL | Age: 65
End: 2025-01-22
Payer: COMMERCIAL

## 2025-01-22 ENCOUNTER — APPOINTMENT (OUTPATIENT)
Dept: RADIOLOGY | Facility: HOSPITAL | Age: 65
End: 2025-01-22
Payer: COMMERCIAL

## 2025-01-22 ENCOUNTER — ANESTHESIA EVENT (OUTPATIENT)
Dept: OPERATING ROOM | Facility: HOSPITAL | Age: 65
End: 2025-01-22
Payer: COMMERCIAL

## 2025-01-22 ENCOUNTER — PHARMACY VISIT (OUTPATIENT)
Dept: PHARMACY | Facility: CLINIC | Age: 65
End: 2025-01-22
Payer: COMMERCIAL

## 2025-01-22 VITALS
BODY MASS INDEX: 39.27 KG/M2 | HEIGHT: 64 IN | OXYGEN SATURATION: 97 % | SYSTOLIC BLOOD PRESSURE: 161 MMHG | DIASTOLIC BLOOD PRESSURE: 75 MMHG | WEIGHT: 230 LBS | RESPIRATION RATE: 17 BRPM | HEART RATE: 64 BPM | TEMPERATURE: 97.7 F

## 2025-01-22 DIAGNOSIS — N20.0 STAGHORN CALCULUS: ICD-10-CM

## 2025-01-22 DIAGNOSIS — R31.9 HEMATURIA, UNSPECIFIED TYPE: Primary | ICD-10-CM

## 2025-01-22 LAB
ABO GROUP (TYPE) IN BLOOD: NORMAL
ANTIBODY SCREEN: NORMAL
GLUCOSE BLD MANUAL STRIP-MCNC: 107 MG/DL (ref 74–99)
GLUCOSE BLD MANUAL STRIP-MCNC: 138 MG/DL (ref 74–99)
RH FACTOR (ANTIGEN D): NORMAL

## 2025-01-22 PROCEDURE — 2550000001 HC RX 255 CONTRASTS: Performed by: UROLOGY

## 2025-01-22 PROCEDURE — 7100000002 HC RECOVERY ROOM TIME - EACH INCREMENTAL 1 MINUTE: Performed by: UROLOGY

## 2025-01-22 PROCEDURE — A50081 PR PERCUT REMV KID STONE,2+ CM: Performed by: ANESTHESIOLOGIST ASSISTANT

## 2025-01-22 PROCEDURE — 3600000003 HC OR TIME - INITIAL BASE CHARGE - PROCEDURE LEVEL THREE: Performed by: UROLOGY

## 2025-01-22 PROCEDURE — A50081 PR PERCUT REMV KID STONE,2+ CM: Performed by: ANESTHESIOLOGY

## 2025-01-22 PROCEDURE — 74420 UROGRAPHY RTRGR +-KUB: CPT | Performed by: UROLOGY

## 2025-01-22 PROCEDURE — 3600000008 HC OR TIME - EACH INCREMENTAL 1 MINUTE - PROCEDURE LEVEL THREE: Performed by: UROLOGY

## 2025-01-22 PROCEDURE — 52310 CYSTOSCOPY AND TREATMENT: CPT | Performed by: UROLOGY

## 2025-01-22 PROCEDURE — 36415 COLL VENOUS BLD VENIPUNCTURE: CPT | Performed by: UROLOGY

## 2025-01-22 PROCEDURE — 2500000005 HC RX 250 GENERAL PHARMACY W/O HCPCS: Performed by: ANESTHESIOLOGIST ASSISTANT

## 2025-01-22 PROCEDURE — 2500000004 HC RX 250 GENERAL PHARMACY W/ HCPCS (ALT 636 FOR OP/ED): Performed by: ANESTHESIOLOGIST ASSISTANT

## 2025-01-22 PROCEDURE — 7100000010 HC PHASE TWO TIME - EACH INCREMENTAL 1 MINUTE: Performed by: UROLOGY

## 2025-01-22 PROCEDURE — 2500000001 HC RX 250 WO HCPCS SELF ADMINISTERED DRUGS (ALT 637 FOR MEDICARE OP): Performed by: UROLOGY

## 2025-01-22 PROCEDURE — 86901 BLOOD TYPING SEROLOGIC RH(D): CPT | Performed by: UROLOGY

## 2025-01-22 PROCEDURE — 7100000001 HC RECOVERY ROOM TIME - INITIAL BASE CHARGE: Performed by: UROLOGY

## 2025-01-22 PROCEDURE — 2500000004 HC RX 250 GENERAL PHARMACY W/ HCPCS (ALT 636 FOR OP/ED): Performed by: ANESTHESIOLOGY

## 2025-01-22 PROCEDURE — 2500000004 HC RX 250 GENERAL PHARMACY W/ HCPCS (ALT 636 FOR OP/ED): Performed by: UROLOGY

## 2025-01-22 PROCEDURE — 3700000002 HC GENERAL ANESTHESIA TIME - EACH INCREMENTAL 1 MINUTE: Performed by: UROLOGY

## 2025-01-22 PROCEDURE — 2500000004 HC RX 250 GENERAL PHARMACY W/ HCPCS (ALT 636 FOR OP/ED): Performed by: NURSE ANESTHETIST, CERTIFIED REGISTERED

## 2025-01-22 PROCEDURE — C1769 GUIDE WIRE: HCPCS | Performed by: UROLOGY

## 2025-01-22 PROCEDURE — 7100000009 HC PHASE TWO TIME - INITIAL BASE CHARGE: Performed by: UROLOGY

## 2025-01-22 PROCEDURE — 50081 PERQ NL/PL LITHOTRP CPLX>2CM: CPT | Performed by: UROLOGY

## 2025-01-22 PROCEDURE — 82365 CALCULUS SPECTROSCOPY: CPT | Performed by: UROLOGY

## 2025-01-22 PROCEDURE — 2780000003 HC OR 278 NO HCPCS: Performed by: UROLOGY

## 2025-01-22 PROCEDURE — 3700000001 HC GENERAL ANESTHESIA TIME - INITIAL BASE CHARGE: Performed by: UROLOGY

## 2025-01-22 PROCEDURE — 82947 ASSAY GLUCOSE BLOOD QUANT: CPT

## 2025-01-22 PROCEDURE — C1894 INTRO/SHEATH, NON-LASER: HCPCS | Performed by: UROLOGY

## 2025-01-22 PROCEDURE — 2720000007 HC OR 272 NO HCPCS: Performed by: UROLOGY

## 2025-01-22 PROCEDURE — 50437 DILAT XST TRC NEW ACCESS RCS: CPT | Performed by: UROLOGY

## 2025-01-22 PROCEDURE — RXMED WILLOW AMBULATORY MEDICATION CHARGE

## 2025-01-22 PROCEDURE — C2617 STENT, NON-COR, TEM W/O DEL: HCPCS | Performed by: UROLOGY

## 2025-01-22 PROCEDURE — 2500000005 HC RX 250 GENERAL PHARMACY W/O HCPCS: Performed by: ANESTHESIOLOGY

## 2025-01-22 DEVICE — URETERAL STENT
Type: IMPLANTABLE DEVICE | Site: URETER | Status: FUNCTIONAL
Brand: PERCUFLEX™ PLUS

## 2025-01-22 RX ORDER — PHENYLEPHRINE HCL IN 0.9% NACL 1 MG/10 ML
SYRINGE (ML) INTRAVENOUS AS NEEDED
Status: DISCONTINUED | OUTPATIENT
Start: 2025-01-22 | End: 2025-01-22

## 2025-01-22 RX ORDER — ACETAMINOPHEN 325 MG/1
975 TABLET ORAL ONCE
Status: DISCONTINUED | OUTPATIENT
Start: 2025-01-22 | End: 2025-01-22 | Stop reason: HOSPADM

## 2025-01-22 RX ORDER — ACETAMINOPHEN 325 MG/1
975 TABLET ORAL ONCE
Status: COMPLETED | OUTPATIENT
Start: 2025-01-22 | End: 2025-01-22

## 2025-01-22 RX ORDER — FAMOTIDINE 10 MG/ML
INJECTION INTRAVENOUS AS NEEDED
Status: DISCONTINUED | OUTPATIENT
Start: 2025-01-22 | End: 2025-01-22

## 2025-01-22 RX ORDER — ONDANSETRON HYDROCHLORIDE 2 MG/ML
4 INJECTION, SOLUTION INTRAVENOUS ONCE AS NEEDED
Status: DISCONTINUED | OUTPATIENT
Start: 2025-01-22 | End: 2025-01-22 | Stop reason: HOSPADM

## 2025-01-22 RX ORDER — OXYCODONE AND ACETAMINOPHEN 5; 325 MG/1; MG/1
1 TABLET ORAL EVERY 4 HOURS PRN
Status: DISCONTINUED | OUTPATIENT
Start: 2025-01-22 | End: 2025-01-22 | Stop reason: HOSPADM

## 2025-01-22 RX ORDER — ROCURONIUM BROMIDE 50 MG/5 ML
SYRINGE (ML) INTRAVENOUS AS NEEDED
Status: DISCONTINUED | OUTPATIENT
Start: 2025-01-22 | End: 2025-01-22

## 2025-01-22 RX ORDER — CIPROFLOXACIN 2 MG/ML
400 INJECTION, SOLUTION INTRAVENOUS ONCE
Status: COMPLETED | OUTPATIENT
Start: 2025-01-22 | End: 2025-01-22

## 2025-01-22 RX ORDER — DIPHENHYDRAMINE HYDROCHLORIDE 50 MG/ML
25 INJECTION INTRAMUSCULAR; INTRAVENOUS ONCE
Status: COMPLETED | OUTPATIENT
Start: 2025-01-22 | End: 2025-01-22

## 2025-01-22 RX ORDER — FENTANYL CITRATE 50 UG/ML
INJECTION, SOLUTION INTRAMUSCULAR; INTRAVENOUS AS NEEDED
Status: DISCONTINUED | OUTPATIENT
Start: 2025-01-22 | End: 2025-01-22

## 2025-01-22 RX ORDER — ALBUTEROL SULFATE 0.83 MG/ML
2.5 SOLUTION RESPIRATORY (INHALATION) ONCE AS NEEDED
Status: DISCONTINUED | OUTPATIENT
Start: 2025-01-22 | End: 2025-01-22 | Stop reason: HOSPADM

## 2025-01-22 RX ORDER — FUROSEMIDE 10 MG/ML
INJECTION INTRAMUSCULAR; INTRAVENOUS AS NEEDED
Status: DISCONTINUED | OUTPATIENT
Start: 2025-01-22 | End: 2025-01-22

## 2025-01-22 RX ORDER — LIDOCAINE HYDROCHLORIDE 20 MG/ML
INJECTION, SOLUTION EPIDURAL; INFILTRATION; INTRACAUDAL; PERINEURAL AS NEEDED
Status: DISCONTINUED | OUTPATIENT
Start: 2025-01-22 | End: 2025-01-22

## 2025-01-22 RX ORDER — BUPIVACAINE HYDROCHLORIDE 5 MG/ML
INJECTION, SOLUTION PERINEURAL AS NEEDED
Status: DISCONTINUED | OUTPATIENT
Start: 2025-01-22 | End: 2025-01-22 | Stop reason: HOSPADM

## 2025-01-22 RX ORDER — SCOPOLAMINE 1 MG/3D
PATCH, EXTENDED RELEASE TRANSDERMAL AS NEEDED
Status: DISCONTINUED | OUTPATIENT
Start: 2025-01-22 | End: 2025-01-22

## 2025-01-22 RX ORDER — MIDAZOLAM HYDROCHLORIDE 1 MG/ML
INJECTION, SOLUTION INTRAMUSCULAR; INTRAVENOUS AS NEEDED
Status: DISCONTINUED | OUTPATIENT
Start: 2025-01-22 | End: 2025-01-22

## 2025-01-22 RX ORDER — POLYETHYLENE GLYCOL 3350 17 G/17G
17 POWDER, FOR SOLUTION ORAL DAILY
Qty: 7 PACKET | Refills: 0 | Status: SHIPPED | OUTPATIENT
Start: 2025-01-22 | End: 2025-01-29

## 2025-01-22 RX ORDER — HYDRALAZINE HYDROCHLORIDE 20 MG/ML
5 INJECTION INTRAMUSCULAR; INTRAVENOUS EVERY 30 MIN PRN
Status: DISCONTINUED | OUTPATIENT
Start: 2025-01-22 | End: 2025-01-22 | Stop reason: HOSPADM

## 2025-01-22 RX ORDER — SODIUM CHLORIDE, SODIUM LACTATE, POTASSIUM CHLORIDE, CALCIUM CHLORIDE 600; 310; 30; 20 MG/100ML; MG/100ML; MG/100ML; MG/100ML
125 INJECTION, SOLUTION INTRAVENOUS CONTINUOUS
Status: DISCONTINUED | OUTPATIENT
Start: 2025-01-22 | End: 2025-01-22 | Stop reason: HOSPADM

## 2025-01-22 RX ORDER — ONDANSETRON HYDROCHLORIDE 2 MG/ML
INJECTION, SOLUTION INTRAVENOUS AS NEEDED
Status: DISCONTINUED | OUTPATIENT
Start: 2025-01-22 | End: 2025-01-22

## 2025-01-22 RX ORDER — OXYCODONE HYDROCHLORIDE 5 MG/1
5 TABLET ORAL EVERY 6 HOURS PRN
Qty: 8 TABLET | Refills: 0 | Status: SHIPPED | OUTPATIENT
Start: 2025-01-22 | End: 2025-01-22 | Stop reason: HOSPADM

## 2025-01-22 RX ORDER — DIPHENHYDRAMINE HYDROCHLORIDE 50 MG/ML
25 INJECTION INTRAMUSCULAR; INTRAVENOUS ONCE
Status: CANCELLED | OUTPATIENT
Start: 2025-01-22

## 2025-01-22 RX ORDER — CEPHALEXIN 500 MG/1
500 CAPSULE ORAL 4 TIMES DAILY
Qty: 12 CAPSULE | Refills: 0 | Status: SHIPPED | OUTPATIENT
Start: 2025-01-22 | End: 2025-01-25

## 2025-01-22 RX ORDER — OXYCODONE HYDROCHLORIDE 10 MG/1
10 TABLET ORAL EVERY 4 HOURS PRN
Status: DISCONTINUED | OUTPATIENT
Start: 2025-01-22 | End: 2025-01-22 | Stop reason: HOSPADM

## 2025-01-22 RX ORDER — PHENAZOPYRIDINE HYDROCHLORIDE 100 MG/1
200 TABLET, FILM COATED ORAL ONCE AS NEEDED
Status: DISCONTINUED | OUTPATIENT
Start: 2025-01-22 | End: 2025-01-22 | Stop reason: HOSPADM

## 2025-01-22 RX ORDER — HYDROMORPHONE HYDROCHLORIDE 0.2 MG/ML
0.1 INJECTION INTRAMUSCULAR; INTRAVENOUS; SUBCUTANEOUS EVERY 5 MIN PRN
Status: DISCONTINUED | OUTPATIENT
Start: 2025-01-22 | End: 2025-01-22 | Stop reason: HOSPADM

## 2025-01-22 RX ORDER — MIDAZOLAM HYDROCHLORIDE 1 MG/ML
1 INJECTION, SOLUTION INTRAMUSCULAR; INTRAVENOUS ONCE AS NEEDED
Status: DISCONTINUED | OUTPATIENT
Start: 2025-01-22 | End: 2025-01-22 | Stop reason: HOSPADM

## 2025-01-22 RX ORDER — DIPHENHYDRAMINE HYDROCHLORIDE 50 MG/ML
INJECTION INTRAMUSCULAR; INTRAVENOUS AS NEEDED
Status: DISCONTINUED | OUTPATIENT
Start: 2025-01-22 | End: 2025-01-22

## 2025-01-22 RX ORDER — PROPOFOL 10 MG/ML
INJECTION, EMULSION INTRAVENOUS AS NEEDED
Status: DISCONTINUED | OUTPATIENT
Start: 2025-01-22 | End: 2025-01-22

## 2025-01-22 RX ORDER — ACETAMINOPHEN 325 MG/1
650 TABLET ORAL EVERY 4 HOURS PRN
Status: DISCONTINUED | OUTPATIENT
Start: 2025-01-22 | End: 2025-01-22 | Stop reason: HOSPADM

## 2025-01-22 RX ORDER — HYDROMORPHONE HYDROCHLORIDE 1 MG/ML
1 INJECTION, SOLUTION INTRAMUSCULAR; INTRAVENOUS; SUBCUTANEOUS EVERY 5 MIN PRN
Status: DISCONTINUED | OUTPATIENT
Start: 2025-01-22 | End: 2025-01-22 | Stop reason: HOSPADM

## 2025-01-22 RX ADMIN — DIPHENHYDRAMINE HYDROCHLORIDE 25 MG: 50 INJECTION INTRAMUSCULAR; INTRAVENOUS at 14:59

## 2025-01-22 RX ADMIN — FENTANYL CITRATE 50 MCG: 50 INJECTION, SOLUTION INTRAMUSCULAR; INTRAVENOUS at 16:50

## 2025-01-22 RX ADMIN — FAMOTIDINE 20 MG: 10 INJECTION, SOLUTION INTRAVENOUS at 16:01

## 2025-01-22 RX ADMIN — Medication 100 MCG: at 17:11

## 2025-01-22 RX ADMIN — Medication 100 MCG: at 17:17

## 2025-01-22 RX ADMIN — FUROSEMIDE 20 MG: 10 INJECTION, SOLUTION INTRAMUSCULAR; INTRAVENOUS at 17:27

## 2025-01-22 RX ADMIN — DIPHENHYDRAMINE HYDROCHLORIDE 25 MG: 50 INJECTION, SOLUTION INTRAMUSCULAR; INTRAVENOUS at 16:02

## 2025-01-22 RX ADMIN — PROPOFOL 200 MG: 10 INJECTION, EMULSION INTRAVENOUS at 15:49

## 2025-01-22 RX ADMIN — ONDANSETRON 4 MG: 2 INJECTION, SOLUTION INTRAMUSCULAR; INTRAVENOUS at 17:31

## 2025-01-22 RX ADMIN — CIPROFLOXACIN 400 MG: 400 INJECTION, SOLUTION INTRAVENOUS at 15:55

## 2025-01-22 RX ADMIN — VANCOMYCIN HYDROCHLORIDE 1500 MG: 1.5 INJECTION, POWDER, LYOPHILIZED, FOR SOLUTION INTRAVENOUS at 12:48

## 2025-01-22 RX ADMIN — DEXAMETHASONE SODIUM PHOSPHATE 8 MG: 4 INJECTION, SOLUTION INTRAMUSCULAR; INTRAVENOUS at 16:00

## 2025-01-22 RX ADMIN — Medication 50 MG: at 15:49

## 2025-01-22 RX ADMIN — SUGAMMADEX 200 MG: 100 INJECTION, SOLUTION INTRAVENOUS at 17:35

## 2025-01-22 RX ADMIN — SCOLOPAMINE TRANSDERMAL SYSTEM 1 PATCH: 1 PATCH, EXTENDED RELEASE TRANSDERMAL at 14:39

## 2025-01-22 RX ADMIN — PROPOFOL 50 MCG/KG/MIN: 10 INJECTION, EMULSION INTRAVENOUS at 16:13

## 2025-01-22 RX ADMIN — MIDAZOLAM 2 MG: 1 INJECTION INTRAMUSCULAR; INTRAVENOUS at 15:42

## 2025-01-22 RX ADMIN — Medication 10 MG: at 16:51

## 2025-01-22 RX ADMIN — SODIUM CHLORIDE, POTASSIUM CHLORIDE, SODIUM LACTATE AND CALCIUM CHLORIDE: 600; 310; 30; 20 INJECTION, SOLUTION INTRAVENOUS at 15:42

## 2025-01-22 RX ADMIN — Medication 6 L/MIN: at 17:48

## 2025-01-22 RX ADMIN — ACETAMINOPHEN 975 MG: 325 TABLET ORAL at 12:47

## 2025-01-22 RX ADMIN — LIDOCAINE HYDROCHLORIDE 80 MG: 20 INJECTION, SOLUTION EPIDURAL; INFILTRATION; INTRACAUDAL; PERINEURAL at 15:49

## 2025-01-22 RX ADMIN — FENTANYL CITRATE 50 MCG: 50 INJECTION, SOLUTION INTRAMUSCULAR; INTRAVENOUS at 15:49

## 2025-01-22 SDOH — HEALTH STABILITY: MENTAL HEALTH: CURRENT SMOKER: 0

## 2025-01-22 ASSESSMENT — COLUMBIA-SUICIDE SEVERITY RATING SCALE - C-SSRS
6. HAVE YOU EVER DONE ANYTHING, STARTED TO DO ANYTHING, OR PREPARED TO DO ANYTHING TO END YOUR LIFE?: NO
1. IN THE PAST MONTH, HAVE YOU WISHED YOU WERE DEAD OR WISHED YOU COULD GO TO SLEEP AND NOT WAKE UP?: NO
2. HAVE YOU ACTUALLY HAD ANY THOUGHTS OF KILLING YOURSELF?: NO

## 2025-01-22 ASSESSMENT — PAIN SCALES - GENERAL
PAINLEVEL_OUTOF10: 0 - NO PAIN

## 2025-01-22 ASSESSMENT — PAIN - FUNCTIONAL ASSESSMENT
PAIN_FUNCTIONAL_ASSESSMENT: 0-10
PAIN_FUNCTIONAL_ASSESSMENT: UNABLE TO SELF-REPORT
PAIN_FUNCTIONAL_ASSESSMENT: 0-10
PAIN_FUNCTIONAL_ASSESSMENT: 0-10

## 2025-01-22 NOTE — ANESTHESIA PROCEDURE NOTES
Airway  Date/Time: 1/22/2025 3:53 PM  Urgency: elective    Airway not difficult    Staffing  Performed: JAROCHO   Authorized by: Caroline Rios MD    Performed by: JAROCHO [place]  Patient location during procedure: OR    Indications and Patient Condition  Indications for airway management: anesthesia  Spontaneous Ventilation: absent  Sedation level: deep  Preoxygenated: yes  Patient position: sniffing  Mask difficulty assessment: 1 - vent by mask  Planned trial extubation    Final Airway Details  Final airway type: endotracheal airway      Successful airway: ETT  Cuffed: yes   Successful intubation technique: direct laryngoscopy  Facilitating devices/methods: intubating stylet  Blade: Phil  Blade size: #3  ETT size (mm): 7.5  Cormack-Lehane Classification: grade I - full view of glottis  Placement verified by: chest auscultation and capnometry   Measured from: lips  ETT to lips (cm): 22  Number of attempts at approach: 1

## 2025-01-22 NOTE — ANESTHESIA PREPROCEDURE EVALUATION
Patient: Savi Govea    Procedure Information       Date/Time: 01/22/25 1420    Procedure: Nephrolithotomy - miniPCNL W/HOLMIUM LASER--LEFT SIDE (Left) - Needs miniPCNL scope set    Location: [facility] 08 / [facility]    Surgeons: Sunny Ordonez MD            Relevant Problems   Cardiac   (+) Essential hypertension   (+) Hyperlipidemia      Neuro   (+) Acute lumbar radiculopathy   (+) Cervical neuritis   (+) Left cervical radiculopathy      GI   (+) Crohn's disease of colon with complication (Multi)      /Renal   (+) Staghorn calculus      Endocrine   (+) Obesity   (+) Type 2 diabetes mellitus without complication, without long-term current use of insulin (Multi)      Hematology   (+) Factor V Leiden carrier (Multi)      Musculoskeletal   (+) Cervical spondylosis with radiculopathy   (+) Osteoarthritis of left hip   (+) Osteoarthritis, knee   (+) Primary osteoarthritis of right knee   (+) Unilateral primary osteoarthritis, left knee       Clinical information reviewed:   Tobacco  Allergies  Meds  Problems  Med Hx  Surg Hx   Fam Hx          NPO Detail:  NPO/Void Status  Date of Last Liquid: 01/22/25  Time of Last Liquid: 1306  Date of Last Solid: 01/21/25  Time of Last Solid: 2345         Physical Exam    Airway  Mallampati: III  TM distance: >3 FB  Neck ROM: full     Cardiovascular - normal exam  Rhythm: regular  Rate: normal     Dental - normal exam     Pulmonary   Breath sounds clear to auscultation  (+) decreased breath sounds     Abdominal   (+) obese  Abdomen: soft  Bowel sounds: normal           Anesthesia Plan    History of general anesthesia?: yes  History of complications of general anesthesia?: yes    ASA 3     general   (Hx of PONV)  The patient is not a current smoker.  Patient was previously instructed to abstain from smoking on day of procedure.  Patient did not smoke on day of procedure.  Education provided regarding risk of obstructive sleep apnea.  intravenous induction    Postoperative administration of opioids is intended.  Anesthetic plan and risks discussed with patient.  Use of blood products discussed with patient who consented to blood products.    Plan discussed with CAA and CRNA.

## 2025-01-22 NOTE — ANESTHESIA PROCEDURE NOTES
Peripheral IV  Date/Time: 1/22/2025 4:15 PM  Inserted by: JAROCHO Mayberry    Placement  Needle size: 20 G  Laterality: right  Location: hand  Site prep: alcohol  Attempts: 2

## 2025-01-22 NOTE — ANESTHESIA POSTPROCEDURE EVALUATION
Patient: Savi Govea    Procedure Summary       Date: 01/22/25 Room / Location: [facility] / [facility]    Anesthesia Start: 1542 Anesthesia Stop: 1755    Procedure: Nephrolithotomy - miniPCNL W/HOLMIUM LASER--LEFT SIDE (Left) Diagnosis:       Staghorn calculus      (Staghorn calculus [N20.0])    Surgeons: Sunny Ordonez MD Responsible Provider: Caroline Rios MD    Anesthesia Type: general ASA Status: 3            Anesthesia Type: general    Vitals Value Taken Time   /78 01/22/25 1750   Temp 36.7 °C (98.1 °F) 01/22/25 1750   Pulse 62 01/22/25 1750   Resp 16 01/22/25 1750   SpO2 96 % 01/22/25 1750       Anesthesia Post Evaluation    Patient location during evaluation: PACU  Patient participation: complete - patient participated  Level of consciousness: awake and alert  Pain management: adequate  Airway patency: patent  Cardiovascular status: acceptable  Respiratory status: acceptable  Hydration status: acceptable  Postoperative Nausea and Vomiting: none        There were no known notable events for this encounter.

## 2025-01-22 NOTE — DISCHARGE INSTRUCTIONS
DEPARTMENT OF Urology  DISCHARGE INSTRUCTIONS -- Percutaneous Nephrolithotomy (PCNL)  Outpatient Surgery    C O N F I D E N T I A L   I N F O R M A T I O N    Savi Govea    Call [phone] during regular daytime business hours (8:00 am - 5:00 pm) and after 5:00 pm and ask for the Urology resident with any questions or concerns.    If it is a life-threatening situation, proceed to the nearest emergency department.        Follow-up appointment:  1/30/25      Thank you for the opportunity to care for you today.  Your health and healing are very important to us.  We hope we made you feel as comfortable as possible and are committed to your recovery and continued well-being.      The following is a brief overview of your PCNL (percutaneous nephrolithotomy) procedure today. Some of the information contained on this summary may be confidential.  This information should be kept in your records and should be shared with your regular doctor.    Physicians:   Dr. Ordonez    Procedure performed: Lasering of your kidney stones through access gained in your back    What to Expect During your Recovery and Home Care  Anesthesia Side Effects   You received general anesthesia today.  You may feel sleepy, tired, or have a sore throat.   You may also feel drowsiness, dizziness, or inability to think clearly.  For your safety, do not drive, drink alcoholic beverages, take any unprescribed medication or make any important decisions for 24 hours.  A responsible adult should be with you for 24 hours.        Activity and Recovery    No heavy lifting. Rest for the next 24 hours.     Pain Control  Unfortunately, you may experience pain after your procedure.  Frequency and urgency to urinate and mild discomfort are expected. Adequate pain management can include alternative measures to help ease your pain and that can include over the counter Tylenol/ibuprofen and a heating pad or Oxycodone which can be taken as prescribed as needed  for breakthrough pain. Do not take more than 4,000mg of Tylenol in a 24-hour period.      You may have also been prescribed Pyridium (for burning sensation) and Ditropan(for bladder spasms) for pain control. Pyridium will turn your urine bright orange.    Nausea/Vomiting   Clear liquids are best tolerated at first. Start slow, advance your diet as tolerated to normal foods. Avoid spicy, greasy, heavy foods at first. Also, you may feel nauseous or like you need to vomit if you take any type of medication on an empty stomach.  Call your physician if you are unable to eat or drink and have persistent vomiting.    Signs of Bleeding   You could have some blood in your urine off and on over the next several weeks. Your urine will be light pink to yellow.    Treatment/wound care:   It is okay to shower 24 hours after time of surgery. If you go home with a tube(s) in your back do not submerge them in water. (no tub bathing, or swimming)    Signs of Infection  Signs of infection can include fever, chills, burning sensation with passing of urine, or severe abdominal pain.  If you see any of these occur, please contact your doctor's office at [phone].  Any fever higher than 100.4, especially if associated with an ill feeling, abdominal pain, chills, or nausea should be reported to your surgeon.      Assist in bowel movements/urination  Increase fiber in diet  Increase water (6 to 8 glasses)  Increase walking   Urination should occur within 6 hours of anesthesia  If you have tried these methods and your bladder still feels full and you cannot use the bathroom, please go to your nearest Emergency room/contact your physician.    Additional Instructions:   Pieces of your kidney stone may pass in the urine for a few days and may cause some mild pain. You also had a stent placed today from your kidney down into your bladder. This helps keep the urinary tract open and prevents blockages of urine flow. The stent can be irritating  and can cause some frequency, urgency and blood in your urine when you go to the bathroom. It is important to drink plenty of water and take medications as prescribed. You may be sent home with Pyridium which turns your urine bright orange. Applying a heating pad to your back will also help with this kind of pain. It will be determined at your follow up appointment when the stent will be removed.     You may also go home with your nephrostomy tube (tube in your back). Dressings for this tube should be changed every three days. Clean around insertion site with alcohol wipe, cover with 4x4 gauze. Monitor for infection at insertion site with dressing change. Signs of infection include green/yellow drainage, swelling, warmth, redness, fever and chills.     Please restart your Eliquis in 5 days.

## 2025-01-22 NOTE — H&P
History Of Present Illness  63yo f here w/ for left pcnl. No recent change in health    Past Medical History  Past Medical History:   Diagnosis Date    Abnormal weight gain     Weight gain, abnormal    Arrhythmia     Arthritis     Cervical disc disorder     Chronic diarrhea     COVID-19     VACCINATED    Crohn's disease (Multi)     Diabetes mellitus (Multi)     Diverticulosis     History of blood clot in brain     Hyperlipidemia     Hypertension     Irregular heart beat     Irritable bowel syndrome     Joint pain     Low back pain     Lumbosacral disc disease     Nephrolithiasis     Neuropathy in diabetes (Multi)     Numbness     Other abnormal glucose     Abnormal glucose    Other conditions influencing health status     Neck sprain and strain    Personal history of other diseases of the circulatory system     History of hypertension    Personal history of other diseases of the musculoskeletal system and connective tissue     History of degenerative disc disease    Radiculopathy, lumbosacral region     Lumbosacral neuritis    Seasonal allergies     Sprain of ligaments of thoracic spine, initial encounter     Thoracic sprain and strain    Type 2 diabetes mellitus     Urinary tract infection     Wears glasses         Surgical History  Past Surgical History:   Procedure Laterality Date    CARPAL TUNNEL RELEASE      DILATION AND CURETTAGE OF UTERUS      ENDOMETRIAL ABLATION      HAND SURGERY  03/18/2015    Hand Surgery                                                                                                                                                          JOINT REPLACEMENT Bilateral     bilateral knees    KNEE ARTHROSCOPY W/ DEBRIDEMENT  03/18/2015    Arthroscopy Knee Right    LIPOMA RESECTION Left     left arm    TONSILLECTOMY  03/18/2015    Tonsillectomy    TUBAL LIGATION  03/18/2015    Tubal Ligation        Social History  She reports that she quit smoking about 20 years ago. Her smoking use included  "cigarettes. She started smoking about 30 years ago. She has a 5 pack-year smoking history. She has never used smokeless tobacco. She reports that she does not currently use alcohol. She reports that she does not use drugs.    Family History  Family History   Problem Relation Name Age of Onset    Uterine cancer Mother Cervical cancer     Cancer Mother Cervical cancer     Hypertension Father Derek     Heart attack Father Derek     Thyroid cancer Sister Thyroid     Cancer Sister Thyroid     Diabetes Brother Alirio     Graves' disease Brother Alirio     Multiple sclerosis Brother Alirio     Cancer Brother Eye cancer     Cancer Brother Abdominal cancer     Diabetes Maternal Grandmother      Skin cancer Maternal Grandmother      Hypertension Maternal Grandfather Edgopal     Diabetes Paternal Grandfather Jeferson     Hypertension Paternal Grandfather Jeferson     Stroke Mother's Sister Lizzette         Allergies  Aspirin, Caffeine, Cefditoren, Cetirizine, Fexofenadine, Lactose, Phenylephrine-guaifenesin, Pseudoephedrine, Pseudoephedrine-guaifenesin, Salicylates, and Spectracef [cefditoren pivoxil]    ROS: 12 system review was completed and is negative with the exception of those signs and symptoms noted in the history of present illness: A 12 system review was completed and is negative with the exception of those signs and symptoms noted in the history of present illness.     Exam:  General: in NAD, appears stated age  Head: normocephalic, atraumatic  Respiratory: normal effort, no use of accessory muscles  Cardiovascular: no edema noted  Skin: normal turgor, no rashes  Neurologic: grossly intact, oriented to person/place/time  Psychiatric: mode and affect appropriate    Per anesthesiology, clear to auscultation bilaterally with a regular rate and rhythm     Last Recorded Vitals  There were no vitals taken for this visit.      No results found for: \"URINECULTURE\", \"CREATININE\"      ASSESSMENT/PLAN:  Okay to proceed with left " PCNL    Sunny Ordonez MD

## 2025-01-22 NOTE — OP NOTE
Nephrolithotomy - miniPCNL W/HOLMIUM LASER--LEFT SIDE (L) Operative Note     Date: 2025  OR Location: [facility]    Name: Savi Govea, : 1960, Age: 64 y.o., MRN: [id number], Sex: female    Diagnosis  Pre-op Diagnosis      * Staghorn calculus [N20.0] Post-op Diagnosis     * Staghorn calculus [N20.0]     Procedures  Nephrolithotomy - miniPCNL W/HOLMIUM LASER--LEFT SIDE  83608 - ME PERQ NL/PL LITHOTRP COMPLEX >2 CM MLT LOCATIONS    1.  Cystoscopy with right stent removal   2.  Left mini PCNL  3.  Left antegrade 6x26 JJ stent placement    Surgeons      * Sunny Ordonez - Primary    Resident/Fellow/Other Assistant:  Surgeons and Role:     * Inez Paulson MD - Resident - Assisting    Staff:   Surgical Assistant:   Circulator: Yadi Garcia Person: Oksana    Anesthesia Staff: Anesthesiologist: Caroline Rios MD  CRNA: TUCKER Mcgarry-CRNA  C-AA: JAROCHO Mayberry  Frontline Breaker: JAROCHO Anaya    Procedure Summary  Anesthesia: Anesthesia type not filed in the log.  ASA: III  Estimated Blood Loss: 25mL  Intra-op Medications:   Administrations occurring from 1420 to 1645 on 25:   Medication Name Total Dose   iohexol (OMNIPaque) 300 mg iodine/mL solution 50 mL   dexAMETHasone (Decadron) injection 4 mg/mL 8 mg   diphenhydrAMINE (BENADryl) injection 25 mg   famotidine PF (Pepcid) injection 20 mg   fentaNYL (Sublimaze) injection 50 mcg/mL 50 mcg   LR bolus Cannot be calculated   lidocaine PF (Xylocaine-MPF) local injection 2 % 80 mg   midazolam (Versed) injection 1 mg/mL 2 mg   propofol (Diprivan) injection 10 mg/mL 366.88 mg   rocuronium (Zemuron) 50 mg/5 mL prefilled syringe 50 mg   scopolamine (Transderm-Scop) 1 mg/3 days patch 1 patch   ciprofloxacin (Cipro) 400 mg in dextrose 5%  mL 400 mg   diphenhydrAMINE (BENADryl) injection 25 mg 25 mg              Anesthesia Record               Intraprocedure I/O Totals          Intake    LR bolus 800.00 mL    Total Intake 800 mL        Output    Urine 0 mL    Est. Blood Loss 25 mL    Total Output 25 mL       Net    Net Volume 775 mL          Specimen:   ID Type Source Tests Collected by Time   A : LEFT RENAL CALCULUS Calculus Urine, Clean Catch CALCULI (STONE) ANALYSIS Sunny Ordonez MD 1/22/2025 1727                 Drains and/or Catheters:   Urethral Catheter Non-latex 16 Fr. (Active)       Tourniquet Times:         Implants:  Implants       Type Name Action Serial No.      Stent STENT, URETERAL PERCUFLEX 6 X 26 [id number] - [id number] Implanted               Findings: Branching stone in the lower pole, no extravasation on antegrade pyelogram at the end of the case    Indications: Savi Govea is an 64 y.o. female who is having surgery for Staghorn calculus [N20.0].     The patient was seen in the preoperative area. The risks, benefits, complications, treatment options, non-operative alternatives, expected recovery and outcomes were discussed with the patient. The possibilities of reaction to medication, pulmonary aspiration, injury to surrounding structures, bleeding, recurrent infection, the need for additional procedures, failure to diagnose a condition, and creating a complication requiring transfusion or operation were discussed with the patient. The patient concurred with the proposed plan, giving informed consent.  The site of surgery was properly noted/marked if necessary per policy. The patient has been actively warmed in preoperative area. Preoperative antibiotics have been ordered and given within 1 hours of incision. Venous thrombosis prophylaxis have been ordered including bilateral sequential compression devices    Procedure Details:     The patient was identified in the perioperative area.  They were informed of the risks and benefits of the procedure and consented as appropriate.  They were wheeled to the operating room where a time-out was performed using 2 patient identifiers.  They were then induced into general  endotracheal anesthesia, placed in frog-left position and prepped and draped in the usual sterile fashion.  Antibiotic prophylaxis were administered (IV ancef).    We began by inserting a cystoscope into the patient's bladder.  Cystoscopy did not reveal any lesions or masses in the bladder mucosa.  Right stent was seen emanating from the ureteral orifice, it was grasped with alligator graspers and removed in its entirety.      Attention was turned to the left ureteral orifice which was cannulated with a 5-Korean open-ended catheter and Sensor wire.  We removed the sensor wire, taking care to maintain the 5Fr ureteral catheter in place.  We then secured the catheter to the patient's inner thigh.  The patient was then repositioned into the prone position for which we then prepped and draped in the usual sterile fashion. Using radiographic guidance and retrograde pyelogram, we were able to identify an mid pole calyx of the kidney that was amenable for percutaneous drainage.  Using our 5Fr sensor wire, we placed a sensor wire. We removed the 5Fr ureteral catheter and placed a 8/10 dilator over the sensor wire. After removing the inner lumen, we advanced a super stiff wire. The outer access was then removed. Using the super stiff wire, we placed a 38cm ureteral access sheath to the level of the proximal ureter. We then inserted the flexible ureteroscope and performed pan pyeloscopy. We confirmed that upper pole access would be ideal based on pre-perative imaging and pyeloscopy. Large stone burden was identified in the renal pelvis and lower pole.    We were able to place a finder needle into the patient's collecting system and upper pole calyx with the return of urine upon aspiration. This was placed under direct visualization using the ureteroscope.  Please note that the access was below the patient's 11th rib. We then inserted a guidewire into the patient's ureter and down into the bladder.  We then incised the skin to  incise approximately 8mm and placed a 8/10 Georgian dilator into the patient's ureter.  We then placed a second super stiff wire for additional access.  Using a working wire, we placed a mini PCNL dilator and dilated the tract into the right pelvis.  We then placed an access sheath under direct visualization into the patient's collecting system.  We then secured our safety wire.  We then proceeded with the mini percutaneous nephrolithotomy.  We inserted a rigid nephroscope, and identified the aforementioned stone burden.  The stone burden was successfully aspirated out following fragmentation with a 365 micron laser fiber.     Stone fragments were sent for stone analysis.  Following visual completion of the remaining stone burden, we used the ureteroscope to performed systemic inspection of all the calices. The remaining lower pole stones were basketed out. We re-inspected the kidney and we did not visualize any remaining stone burden.  We then placed a 6x26 sided double-J ureteral stent in a retrograde fashion confirming its coil in the bladder radiographically and visualized the proximal curl in the renal pelvis.  We then removed our safety wire, working wire and access sheath and applied pressure to the incision area for approximately 2 minutes.     We then reapproximated the skin with a single 4-0 Vicryl suture. Dermabond was applied to the incision site.     The patient was placed in supine position, extubated and transferred to PACU in satisfactory condition.    DISCHARGE AND FOLLOWUP PLAN: Plan for same-day discharge    Complications:  None; patient tolerated the procedure well.    Disposition: PACU - hemodynamically stable.  Condition: stable       Additional Details: Plan for patient to restart Eliquis in 5 days, 3 days of antibiotics    Attending Attestation: I was present and scrubbed for the entire procedure.    Sunny Ordonez  Phone Number: [phone]

## 2025-01-25 LAB
APPEARANCE STONE: NORMAL
COMPN STONE: NORMAL
SPECIMEN WT: 1275 MG

## 2025-01-29 RX ORDER — LIDOCAINE HYDROCHLORIDE 20 MG/ML
1 JELLY TOPICAL ONCE
Status: COMPLETED | OUTPATIENT
Start: 2025-01-30 | End: 2025-01-30

## 2025-01-30 ENCOUNTER — PROCEDURE VISIT (OUTPATIENT)
Dept: UROLOGY | Facility: CLINIC | Age: 65
End: 2025-01-30
Payer: COMMERCIAL

## 2025-01-30 ENCOUNTER — HOSPITAL ENCOUNTER (OUTPATIENT)
Dept: RADIOLOGY | Facility: HOSPITAL | Age: 65
Discharge: HOME | End: 2025-01-30
Payer: COMMERCIAL

## 2025-01-30 ENCOUNTER — APPOINTMENT (OUTPATIENT)
Dept: UROLOGY | Facility: CLINIC | Age: 65
End: 2025-01-30
Payer: COMMERCIAL

## 2025-01-30 VITALS — SYSTOLIC BLOOD PRESSURE: 144 MMHG | HEART RATE: 79 BPM | TEMPERATURE: 95 F | DIASTOLIC BLOOD PRESSURE: 76 MMHG

## 2025-01-30 DIAGNOSIS — D32.9 MENINGIOMA (MULTI): ICD-10-CM

## 2025-01-30 DIAGNOSIS — N20.0 STAGHORN CALCULUS: ICD-10-CM

## 2025-01-30 PROCEDURE — 52310 CYSTOSCOPY AND TREATMENT: CPT | Performed by: UROLOGY

## 2025-01-30 PROCEDURE — 70553 MRI BRAIN STEM W/O & W/DYE: CPT

## 2025-01-30 PROCEDURE — 2550000001 HC RX 255 CONTRASTS: Performed by: PHYSICIAN ASSISTANT

## 2025-01-30 PROCEDURE — A9575 INJ GADOTERATE MEGLUMI 0.1ML: HCPCS | Performed by: PHYSICIAN ASSISTANT

## 2025-01-30 PROCEDURE — 2500000005 HC RX 250 GENERAL PHARMACY W/O HCPCS: Performed by: UROLOGY

## 2025-01-30 RX ORDER — GADOTERATE MEGLUMINE 376.9 MG/ML
0.2 INJECTION INTRAVENOUS
Status: COMPLETED | OUTPATIENT
Start: 2025-01-30 | End: 2025-01-30

## 2025-01-30 RX ADMIN — GADOTERATE MEGLUMINE 20 ML: 376.9 INJECTION INTRAVENOUS at 16:57

## 2025-01-30 RX ADMIN — LIDOCAINE HYDROCHLORIDE 1 APPLICATION: 20 JELLY TOPICAL at 13:10

## 2025-01-30 ASSESSMENT — ENCOUNTER SYMPTOMS
OCCASIONAL FEELINGS OF UNSTEADINESS: 0
DEPRESSION: 0
LOSS OF SENSATION IN FEET: 0

## 2025-01-30 NOTE — PROGRESS NOTES
CHIEF COMPLAINT:  Stent removal following bilateral mini PCNL for nephrolithiasis    PAST UROLOGICAL HISTORY:  64-year-old woman presented with painless hematuria, darker than fruit punch, without clots. CT abdomen/pelvis with IV contrast on 07/02/2024 showed bilateral nephrolithiasis, left greater than right. Left kidney had 2.2 cm stone in renal pelvis with additional upper and lower pole nonobstructing calculi. Right kidney had similar size stone in renal pelvis. Underwent right mini PCNL on 01/03/2025 for large lower pole branching staghorn calculus >2.5 cm, uncomplicated. Left mini PCNL performed on 11/22/2025 for branching stone left lower pole >2 cm, uncomplicated. Stone analysis revealed calcium oxalate composition.    HPI TODAY 01/30/2025:  - Here for stent removal  - Reports doing better overall  - Left stent causing less discomfort than right  - Denies fevers or chills    PMH, PSH, MEDS, ALLERGIES, SH, and FH:  PMH:  - Hypertension  - Hyperlipidemia  - Factor V Leiden  - Type 2 diabetes  - BMI 40.3  - Crohn's disease  - Cerebral venous sinus thrombosis    Medications:  - Jardiance  - Eliquis    PHYSICAL EXAMINATION:  Stent removal performed without complications    Patient ID: Savi Govea is a 64 y.o. female.    Cystoscopy    Date/Time: 1/30/2025 1:14 PM    Performed by: Sunny Ordonez MD  Authorized by: Sunny Ordonez MD      Comments:      Patient's genitalia were prepped and draped in the usual sterile fashion. A 15 Azeri flexible cystoscope was passed atraumatically per the urethra. We entered the bladder and identified the previously placed ureteral stent emanating from the right ureteral orifice. It was grasped with a flexible grasper and removed intact. Patient tolerated the procedure without difficulty.          ASSESSMENT AND PLAN:    1. Bilateral Nephrolithiasis (N20.0)  - Assessment: Status post bilateral mini PCNL with stent removal today  - Plan:  * 24-hour urine collection in 6  weeks  * Renal ultrasound in 6-8 weeks  * Follow-up visit to review results and discuss stone prevention  - Counseling: May experience dysuria initially post stent removal, expected to resolve within 1-2 days    FOLLOW UP:  Return in 6-8 weeks with completed 24-hour urine collection and renal ultrasound    SHORT SUMMARY:  Woman with bilateral nephrolithiasis status post bilateral mini PCNLs had uncomplicated stent removal today. Plan for metabolic stone workup and follow-up imaging in 6-8 weeks.    Additional Notes:  - Patient education provided regarding expected post-stent removal symptoms including temporary dysuria  - Instructions given regarding timing of 24-hour urine collection to coincide with ultrasound appointment

## 2025-01-31 ENCOUNTER — APPOINTMENT (OUTPATIENT)
Dept: HEMATOLOGY/ONCOLOGY | Facility: CLINIC | Age: 65
End: 2025-01-31
Payer: COMMERCIAL

## 2025-01-31 ENCOUNTER — OFFICE VISIT (OUTPATIENT)
Dept: HEMATOLOGY/ONCOLOGY | Facility: CLINIC | Age: 65
End: 2025-01-31
Payer: COMMERCIAL

## 2025-01-31 VITALS
OXYGEN SATURATION: 93 % | WEIGHT: 229.5 LBS | DIASTOLIC BLOOD PRESSURE: 75 MMHG | TEMPERATURE: 97.2 F | RESPIRATION RATE: 16 BRPM | HEART RATE: 69 BPM | BODY MASS INDEX: 39.39 KG/M2 | SYSTOLIC BLOOD PRESSURE: 116 MMHG

## 2025-01-31 DIAGNOSIS — E11.9 TYPE 2 DIABETES MELLITUS WITHOUT COMPLICATION, WITHOUT LONG-TERM CURRENT USE OF INSULIN (MULTI): ICD-10-CM

## 2025-01-31 DIAGNOSIS — D68.51 FACTOR V LEIDEN CARRIER (MULTI): ICD-10-CM

## 2025-01-31 DIAGNOSIS — D32.9 MENINGIOMA (MULTI): ICD-10-CM

## 2025-01-31 DIAGNOSIS — I10 ESSENTIAL HYPERTENSION: ICD-10-CM

## 2025-01-31 DIAGNOSIS — G08 CEREBRAL VENOUS SINUS THROMBOSIS (HHS-HCC): Primary | ICD-10-CM

## 2025-01-31 DIAGNOSIS — K50.119 CROHN'S DISEASE OF COLON WITH COMPLICATION (MULTI): ICD-10-CM

## 2025-01-31 DIAGNOSIS — G08 CEREBRAL VENOUS SINUS THROMBOSIS (HHS-HCC): ICD-10-CM

## 2025-01-31 PROCEDURE — 3078F DIAST BP <80 MM HG: CPT | Performed by: INTERNAL MEDICINE

## 2025-01-31 PROCEDURE — 3074F SYST BP LT 130 MM HG: CPT | Performed by: INTERNAL MEDICINE

## 2025-01-31 PROCEDURE — 99214 OFFICE O/P EST MOD 30 MIN: CPT | Performed by: INTERNAL MEDICINE

## 2025-01-31 PROCEDURE — 4010F ACE/ARB THERAPY RXD/TAKEN: CPT | Performed by: INTERNAL MEDICINE

## 2025-01-31 ASSESSMENT — PAIN SCALES - GENERAL: PAINLEVEL_OUTOF10: 0-NO PAIN

## 2025-01-31 NOTE — PROGRESS NOTES
Patient ID: Savi Govea is a 64 y.o. female.  Referring Physician: Ugo Bates MD  [address]  [place] [address]  Primary Care Provider: Mary Cortez,   Visit Type: Follow Up      Subjective    HPI How was my brain MRI?  Do you think I should keep taking eliquis?    Review of Systems   Constitutional: Negative.    HENT:  Negative.     Eyes: Negative.    Respiratory: Negative.     Cardiovascular: Negative.    Gastrointestinal: Negative.    Endocrine: Negative.    Genitourinary: Negative.     Musculoskeletal: Negative.    Skin: Negative.    Neurological: Negative.    Hematological: Negative.    Psychiatric/Behavioral: Negative.          Objective   BSA: 2.17 meters squared  /75 (BP Location: Right arm, Patient Position: Sitting, BP Cuff Size: Large adult)   Pulse 69   Temp 36.2 °C (97.2 °F) (Temporal)   Resp 16   Wt 104 kg (229 lb 8 oz)   SpO2 93%   BMI 39.39 kg/m²      has a past medical history of Abnormal weight gain, Arrhythmia, Arthritis, Cervical disc disorder, Chronic diarrhea, COVID-19, Crohn's disease (Multi), Diabetes mellitus (Multi), Diverticulosis, History of blood clot in brain, Hyperlipidemia, Hypertension, Irregular heart beat, Irritable bowel syndrome, Joint pain, Low back pain, Lumbosacral disc disease, Nephrolithiasis, 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   has a past surgical history that includes Tonsillectomy (03/18/2015); Tubal ligation (03/18/2015); Hand surgery (03/18/2015); Knee arthroscopy w/ debridement (03/18/2015); Endometrial  ablation; Dilation and curettage of uterus; Joint replacement (Bilateral); Carpal tunnel release; and Lipoma resection (Left).  Family History   Problem Relation Name Age of Onset    Uterine cancer Mother Cervical cancer     Cancer Mother Cervical cancer     Hypertension Father Derek     Heart attack Father Derek     Thyroid cancer Sister Thyroid     Cancer Sister Thyroid     Diabetes Brother Alirio     Graves' disease Brother Alirio     Multiple sclerosis Brother Alirio     Cancer Brother Eye cancer     Cancer Brother Abdominal cancer     Diabetes Maternal Grandmother      Skin cancer Maternal Grandmother      Hypertension Maternal Grandfather Edgopal     Diabetes Paternal Grandfather Jeferson     Hypertension Paternal Grandfather Jeferson     Stroke Mother's Sister Lizzette      Oncology History    No history exists.       Savi Govea  reports that she quit smoking about 20 years ago. Her smoking use included cigarettes. She started smoking about 30 years ago. She has a 5 pack-year smoking history. She has never used smokeless tobacco.  She  reports that she does not currently use alcohol.  She  reports no history of drug use.    Physical Exam  Vitals reviewed.   Constitutional:       Appearance: Normal appearance.   HENT:      Head: Normocephalic.      Mouth/Throat:      Mouth: Mucous membranes are moist.   Eyes:      Extraocular Movements: Extraocular movements intact.      Pupils: Pupils are equal, round, and reactive to light.   Cardiovascular:      Rate and Rhythm: Normal rate and regular rhythm.      Pulses: Normal pulses.      Heart sounds: Normal heart sounds.   Pulmonary:      Effort: Pulmonary effort is normal.      Breath sounds: Normal breath sounds.   Abdominal:      General: Bowel sounds are normal.      Palpations: Abdomen is soft.   Musculoskeletal:         General: Normal range of motion.      Cervical back: Normal range of motion and neck supple.   Skin:     General: Skin is warm.   Neurological:       General: No focal deficit present.      Mental Status: She is alert and oriented to person, place, and time.   Psychiatric:         Mood and Affect: Mood normal.         Behavior: Behavior normal.         WBC   Date/Time Value Ref Range Status   12/31/2024 10:43 AM 9.0 4.4 - 11.3 x10*3/uL Final   10/07/2024 05:48 PM 8.3 4.4 - 11.3 x10*3/uL Final   07/02/2024 12:19 PM 10.5 4.4 - 11.3 x10*3/uL Final     nRBC   Date Value Ref Range Status   12/31/2024 0.0 0.0 - 0.0 /100 WBCs Final   10/07/2024 0.0 0.0 - 0.0 /100 WBCs Final   07/02/2024 0.0 0.0 - 0.0 /100 WBCs Final     RBC   Date Value Ref Range Status   12/31/2024 5.23 (H) 4.00 - 5.20 x10*6/uL Final   10/07/2024 5.40 (H) 4.00 - 5.20 x10*6/uL Final   07/02/2024 5.48 (H) 4.00 - 5.20 x10*6/uL Final     Hemoglobin   Date Value Ref Range Status   12/31/2024 13.0 12.0 - 16.0 g/dL Final   10/07/2024 14.8 12.0 - 16.0 g/dL Final   07/02/2024 14.0 12.0 - 16.0 g/dL Final     Hematocrit   Date Value Ref Range Status   12/31/2024 42.2 36.0 - 46.0 % Final   10/07/2024 46.2 (H) 36.0 - 46.0 % Final   07/02/2024 43.8 36.0 - 46.0 % Final     MCV   Date/Time Value Ref Range Status   12/31/2024 10:43 AM 81 80 - 100 fL Final   10/07/2024 05:48 PM 86 80 - 100 fL Final   07/02/2024 12:19 PM 80 80 - 100 fL Final     MCH   Date/Time Value Ref Range Status   12/31/2024 10:43 AM 24.9 (L) 26.0 - 34.0 pg Final   10/07/2024 05:48 PM 27.4 26.0 - 34.0 pg Final   07/02/2024 12:19 PM 25.5 (L) 26.0 - 34.0 pg Final     MCHC   Date/Time Value Ref Range Status   12/31/2024 10:43 AM 30.8 (L) 32.0 - 36.0 g/dL Final   10/07/2024 05:48 PM 32.0 32.0 - 36.0 g/dL Final   07/02/2024 12:19 PM 32.0 32.0 - 36.0 g/dL Final     RDW   Date/Time Value Ref Range Status   12/31/2024 10:43 AM 13.1 11.5 - 14.5 % Final   10/07/2024 05:48 PM 14.6 (H) 11.5 - 14.5 % Final   07/02/2024 12:19 PM 15.5 (H) 11.5 - 14.5 % Final     Platelets   Date/Time Value Ref Range Status   12/31/2024 10:43  150 - 450 x10*3/uL Final  "  10/07/2024 05:48  150 - 450 x10*3/uL Final   07/02/2024 12:19  150 - 450 x10*3/uL Final     No results found for: \"MPV\"  Neutrophils %   Date/Time Value Ref Range Status   10/07/2024 05:48 PM 70.2 40.0 - 80.0 % Final   07/02/2024 12:19 PM 79.0 40.0 - 80.0 % Final   06/17/2024 12:02 PM 79.2 40.0 - 80.0 % Final     Immature Granulocytes %, Automated   Date/Time Value Ref Range Status   10/07/2024 05:48 PM 0.4 0.0 - 0.9 % Final     Comment:     Immature Granulocyte Count (IG) includes promyelocytes, myelocytes and metamyelocytes but does not include bands. Percent differential counts (%) should be interpreted in the context of the absolute cell counts (cells/UL).   07/02/2024 12:19 PM 0.5 0.0 - 0.9 % Final     Comment:     Immature Granulocyte Count (IG) includes promyelocytes, myelocytes and metamyelocytes but does not include bands. Percent differential counts (%) should be interpreted in the context of the absolute cell counts (cells/UL).   06/17/2024 12:02 PM 0.2 0.0 - 0.9 % Final     Comment:     Immature Granulocyte Count (IG) includes promyelocytes, myelocytes and metamyelocytes but does not include bands. Percent differential counts (%) should be interpreted in the context of the absolute cell counts (cells/UL).     Lymphocytes %   Date/Time Value Ref Range Status   10/07/2024 05:48 PM 19.6 13.0 - 44.0 % Final   07/02/2024 12:19 PM 12.6 13.0 - 44.0 % Final   06/17/2024 12:02 PM 12.1 13.0 - 44.0 % Final     Monocytes %   Date/Time Value Ref Range Status   10/07/2024 05:48 PM 7.3 2.0 - 10.0 % Final   07/02/2024 12:19 PM 6.1 2.0 - 10.0 % Final   06/17/2024 12:02 PM 5.6 2.0 - 10.0 % Final     Eosinophils %   Date/Time Value Ref Range Status   10/07/2024 05:48 PM 1.7 0.0 - 6.0 % Final   07/02/2024 12:19 PM 1.0 0.0 - 6.0 % Final   06/17/2024 12:02 PM 2.2 0.0 - 6.0 % Final     Basophils %   Date/Time Value Ref Range Status   10/07/2024 05:48 PM 0.8 0.0 - 2.0 % Final   07/02/2024 12:19 PM 0.8 0.0 - 2.0 % " "Final   06/17/2024 12:02 PM 0.7 0.0 - 2.0 % Final     Neutrophils Absolute   Date/Time Value Ref Range Status   10/07/2024 05:48 PM 5.81 1.20 - 7.70 x10*3/uL Final     Comment:     Percent differential counts (%) should be interpreted in the context of the absolute cell counts (cells/uL).   07/02/2024 12:19 PM 8.32 (H) 1.20 - 7.70 x10*3/uL Final     Comment:     Percent differential counts (%) should be interpreted in the context of the absolute cell counts (cells/uL).   06/17/2024 12:02 PM 9.52 (H) 1.20 - 7.70 x10*3/uL Final     Comment:     Percent differential counts (%) should be interpreted in the context of the absolute cell counts (cells/uL).     Immature Granulocytes Absolute, Automated   Date/Time Value Ref Range Status   10/07/2024 05:48 PM 0.03 0.00 - 0.70 x10*3/uL Final   07/02/2024 12:19 PM 0.05 0.00 - 0.70 x10*3/uL Final   06/17/2024 12:02 PM 0.03 0.00 - 0.70 x10*3/uL Final     Lymphocytes Absolute   Date/Time Value Ref Range Status   10/07/2024 05:48 PM 1.62 1.20 - 4.80 x10*3/uL Final   07/02/2024 12:19 PM 1.33 1.20 - 4.80 x10*3/uL Final   06/17/2024 12:02 PM 1.45 1.20 - 4.80 x10*3/uL Final     Monocytes Absolute   Date/Time Value Ref Range Status   10/07/2024 05:48 PM 0.60 0.10 - 1.00 x10*3/uL Final   07/02/2024 12:19 PM 0.64 0.10 - 1.00 x10*3/uL Final   06/17/2024 12:02 PM 0.67 0.10 - 1.00 x10*3/uL Final     Eosinophils Absolute   Date/Time Value Ref Range Status   10/07/2024 05:48 PM 0.14 0.00 - 0.70 x10*3/uL Final   07/02/2024 12:19 PM 0.11 0.00 - 0.70 x10*3/uL Final   06/17/2024 12:02 PM 0.27 0.00 - 0.70 x10*3/uL Final     Basophils Absolute   Date/Time Value Ref Range Status   10/07/2024 05:48 PM 0.07 0.00 - 0.10 x10*3/uL Final   07/02/2024 12:19 PM 0.08 0.00 - 0.10 x10*3/uL Final   06/17/2024 12:02 PM 0.08 0.00 - 0.10 x10*3/uL Final       No components found for: \"PT\"  aPTT   Date/Time Value Ref Range Status   12/31/2024 10:43 AM 37 27 - 38 seconds Final   10/07/2024 05:48 PM 36 27 - 38 seconds " Final   2023 01:01 PM 38 27 - 38 sec Final     Comment:     Note new reference range as of 2023 at 10:00am.   2023 01:10 PM 35 26 - 39 sec Final     Comment:       THE APTT IS NO LONGER USED FOR MONITORING     UNFRACTIONATED HEPARIN THERAPY.    FOR MONITORING HEPARIN THERAPY,     USE THE HEPARIN ASSAY.       Medication Documentation Review Audit       Reviewed by Beulah Spence MA (Medical Assistant) on 25 at 1456      Medication Order Taking? Sig Documenting Provider Last Dose Status   alpha lipoic acid 300 mg capsule [id number] Yes Take 1 capsule by mouth 2 times a day. Liliam Reis MD Past Week Active   ammonium lactate (Lac-Hydrin) 12 % lotion [id number] Yes Apply topically once daily as needed. As directed Historical Provider, MD Unknown Active   apixaban (Eliquis) 2.5 mg tablet [id number] Yes Take 1 tablet (2.5 mg) by mouth 2 times a day. Ugo Bates MD Past Week Active   ascorbic acid (Vitamin C) 1,000 mg tablet [id number] Yes Take 1 tablet (1,000 mg) by mouth once daily. Liliam Reis MD Past Week Active   calcium citrate-vitamin D3 200 mg-3.125 mcg (125 unit) tablet [id number] Yes Take 1 tablet by mouth once daily. Liliam Reis MD Past Week Active   cephalexin (Keflex) 500 mg capsule [id number]  Take 1 capsule (500 mg) by mouth 4 times a day for 3 days. Inez Paulson MD   25 2359   CHROMIUM PICOLINATE ORAL [id number] Yes Take 1 tablet by mouth once daily. Liliam Reis MD Past Week Active   Cinnamon 500 mg capsule [id number] Yes Take 1 capsule (500 mg) by mouth 2 times a day. Liliam Reis MD Past Week Active   glimepiride (Amaryl) 4 mg tablet [id number] Yes Take 1 tablet (4 mg) by mouth 2 times a day. Liliam Reis MD 2025 Active   irbesartan (Avapro) 300 mg tablet [id number] Yes Take 1 tablet (300 mg) by mouth once daily. Liliam Reis MD 2025 Active   Januvia 100 mg tablet [id number] Yes Take 1 tablet (100 mg) by  mouth once daily. Liliam Reis MD Past Week Active   Jardiance 25 mg [id number] Yes Take 1 tablet (25 mg) by mouth once daily. Liliam Reis MD Past Week Active   magnesium oxide 400 mg magnesium capsule [id number] Yes Take 1 capsule (400 mg) by mouth once daily. Liliam Reis MD Past Week Active   metFORMIN (Glucophage) 1,000 mg tablet [id number] Yes Take 1 tablet (1,000 mg) by mouth 2 times daily (morning and late afternoon). Liliam Reis MD 2025 Active   metoprolol succinate XL (Toprol-XL) 100 mg 24 hr tablet [id number] Yes Take 1 tablet (100 mg) by mouth once daily. Liliam Reis MD 2025 Active   multivitamin tablet [id number] Yes Take 1 tablet by mouth once daily. Liliam Reis MD Past Week Active   ondansetron ODT (Zofran-ODT) 8 mg disintegrating tablet [id number] Yes Take 1 tablet (8 mg) by mouth every 8 hours if needed for nausea or vomiting. Melly Winn MD Past Month Active   polyethylene glycol (Glycolax, Miralax) 17 gram packet [id number]  Mix 1 packet (17 g) with 4 to 6 ounces of water or other beverage and take by mouth once daily for 7 days. Inez Paulson MD   25 2359   pomegranate fruit extract 250 mg capsule [id number] Yes Take 1 capsule by mouth 2 times a day. Liliam Reis MD Past Week Active   potassium citrate 99 mg capsule [id number] Yes Take 99 mg by mouth once daily. Liliam Reis MD Past Week Active   risankizumab-rzaa (Skyrizi) 180 mg/1.2 mL (150 mg/mL) wearable injector injection [id number] Yes Inject 1.2 mL (180 mg) under the skin every 8 (eight) weeks. Melly Winn MD Past Month Active   saccharomyces boulardii (Florastor) 250 mg capsule [id number] Yes Take 1 capsule (250 mg) by mouth 2 times a day. Liliam Reis MD Past Week Active   Soma 350 mg tablet [id number] Yes Take 1 tablet (350 mg) by mouth once daily. Liliam Reis MD 2025 Active   zinc gluconate 50 mg tablet [id number] Yes Take 1  tablet (50 mg) by mouth once daily. Historical Provider, MD Past Week Active                     Assessment/Plan    1) cerebral sinus thrombosis  -she recently was started on skyrizi  -she developed significant nausea  -Dr Winn sent her for a brain MRI which was done on 10/4/2024 which showed incidental note of an extra-axial dural based enhancing mass 2 x 1.7 x 1.5 cm along the anterior superior left parasagittal frontal lobe which demonstrates mass effect with effacement of the adjacent sulci; a short segment filling defect is noted within the left sigmoid sinus with associated slow flow within the left transverse sinus on FLAIR imaging; an additional focal filling defect is noted within the right sigmoid sinus  -she was told to go to the ED  -MRA/MRV was done on 10/7/2024--redemonstrated filling defects noted in the bilateral sigmoid sinus; a region of filling defect on the left up to 2.7 cm and persists on delayed phase post-contrast enhanced imaging; there is a redemonstrated small persistent non-occlusive filling defect on the right up to 8 mm; eccentric small filling defect in the right straight sinus likely arachnoid cyst; superior sagittal sinus, straight sinus, and deep internal cerebral veins are patent; transverse sinuses, sigmoid sinuses, and included portions of the internal jugular veins are patent; redemonstrated enhancing extra-axial lesion over the left frontal lobe at least 2.1 cm compatible with meningioma with mild local mass effect; no evidence of major vessel cutoff or significant stenosis on MRA  -she was discharged on eliquis  -earlier this year she underwent left knee replacement, and was put on xarelto for postop thromboprophylaxis--this was complicated by episodes of gross hematuria  -she started eliquis the conventional way--10 mg BID x 7 days then switched to 5 mg BID, however, she has been noting repeat episodes of gross hematuria since starting eliquis, so she is now taking only  "half of a tablet (2.5 mg) once daily  -most likely precipitating risk factor for VTE was Crohns; IBD is a well-recognized risk factor for VTE  -Skyrizi is not associated with VTE  -we generally do not perform inheritable thrombophilia workup in the inpatient setting either, as acute thromboses can lead to depressed levels of protein C and S, false positive lupus anticoagulant; nevertheless the hospital team started the workup; and results do not at all affect management of her thrombotic event  -she needs to be anticoagulated for minimum 3 months, if not 6 months  -JAK2 V617F was not detected (although this does not rule out the entire spectrum of myeloproliferative disorders)  -factor V Leiden--she is heterozygote carrier; while this in and of itself does not guarantee developing a thrombotic event, certainly if other risk factors for VTE come on board (such as obesity and medical conditions like IBD), then she can have a much higher risk for VTE  -I have also strongly advised consulting with urology to investigate the cause of her gross hematuria  -I did advise her that since eliquis has such a short life, if he is taking only one dose daily, then she is in essence \"unprotected\" for half of each day  -because of the gross hematuria, she is fearful of taking a full dose (5 mg) eliquis twice a day, so she requested prescription for 2.5 mg BID; she says if she sees a lot of blood in the urine, she may even take half of 2.5 mg BID  -she is still nauseous, and now having headaches--so will order new brain MRI with contrast to make sure the cerebral sinus thromboses are not propagating  -here for interval followup via telephone  -since switching to 1/2 of 2.5 mg (=1.25 mg) BID eliquis, she says she no longer sees gross hematuria; she is about to see urologist  -brain MRI done on 10/30/2024 reviewed--enhancing dural based mass along the left aspect of the falx and mildly impressing on the medial aspect of the left " superior frontal gyrus is unchanged; there is no diffusion restriction abnormality to suggest acute infarct; there are again intraluminal filling defects within the left greater than right sigmoid sinuses similar to prior exam  -advised her to complete no less than a 3 month course of eliquis  -will see her back in 2 months  -here for interval followup  -continues to take eliquis 2.5 mg BID  -no further hematuria as urology (Dr Ordonez) has taken care of her nephrolithiasis problem (she underwent nephrolithotomy with holmium laser on right side on 1/3/2025 and on left side on 1/22/2025)  -brain MRI done on 1/30/2025 ordered by neurosurgery reviewed--dural based enhancing mass arising from left frontal parafalcine region 2.2 x 1.8 x 1.6 cm unchanged with adjacent sulcal effacement within parasagittal left frontal lobe without vasogenic edema; there is no extension of the presumed meningioma across the falx; no overt occlusion of the superior sagittal sinus; there are unchanged intraluminal defects involving the left greater than right distal transverse and left sigmoid dural venous sinuses that may represent prominent arachnoid granulations and/or chronic partially occlusive thrombus component  -there is family history of stroke, and she is also a factor V Leiden heterozygote, which puts her at increased risk, not necessarily for stroke, but for recurrent VTE including cerebral sinus thrombosis  -benefits of remaining on anticoagulation long term outweigh risks  -she will continue with eliquis 2.5 mg BID--she requested refills be sent in to her mailaway  -will see her again in 1 year     2) Crohns  -on skyrizi     3) diabetes  -on glimepiride  -on januvia  -on jardiance  -on metformin     4) hypertension  -on irbesartan  -on metoprolol     5) meningioma  -incidentally found on recent brain MRI  -she will be following with neurosurgery        Problem List Items Addressed This Visit             ICD-10-CM    Cerebral  venous sinus thrombosis (HHS-HCC) G08    Relevant Orders    Clinic Appointment Request Follow Up; UGO BATES; [facility]    CBC and Auto Differential    Factor V Leiden carrier (Multi) D68.51    Relevant Orders    Clinic Appointment Request Follow Up; UGO BATES; [facility]    CBC and Auto Differential            Ugo Bates MD

## 2025-02-01 ASSESSMENT — ENCOUNTER SYMPTOMS
MUSCULOSKELETAL NEGATIVE: 1
GASTROINTESTINAL NEGATIVE: 1
PSYCHIATRIC NEGATIVE: 1
EYES NEGATIVE: 1
HEMATOLOGIC/LYMPHATIC NEGATIVE: 1
CARDIOVASCULAR NEGATIVE: 1
NEUROLOGICAL NEGATIVE: 1
CONSTITUTIONAL NEGATIVE: 1
ENDOCRINE NEGATIVE: 1
RESPIRATORY NEGATIVE: 1

## 2025-02-03 ENCOUNTER — TELEPHONE (OUTPATIENT)
Dept: NEUROSURGERY | Facility: HOSPITAL | Age: 65
End: 2025-02-03
Payer: COMMERCIAL

## 2025-02-03 DIAGNOSIS — D32.9 MENINGIOMA (MULTI): Primary | ICD-10-CM

## 2025-02-03 NOTE — TELEPHONE ENCOUNTER
Result Communication    Resulted Orders   MR brain w and wo IV contrast    Narrative    Interpreted By:  Ronal Cross,   STUDY:  MR BRAIN W AND WO IV CONTRAST;  1/30/2025 5:28 pm      INDICATION:  Signs/Symptoms:meningioma surveillance.      ,D32.9 Benign neoplasm of meninges, unspecified      COMPARISON:  MRI brain dated 10/30/2024.      ACCESSION NUMBER(S):  [id number]      ORDERING CLINICIAN:  MARIO ALBERTO SOLANO      TECHNIQUE:  Standard multiplanar multisequence MR imaging was performed through  the brain prior to and following administration of 20 ML Dotarem  intravenous contrast.      FINDINGS:  Parenchyma: There is a dural-based enhancing mass arising from the  left frontal parafalcine region measuring 2.2 x 1.8 x 1.6 cm (series  12, image 86, series 15, image 62), unchanged in size/appearance when  measured in an analogous fashion. There is adjacent sulcal effacement  within the parasagittal left frontal lobe without vasogenic edema.  There is no midline shift or herniation. There is no extension of the  presumed meningioma across the falx.      There is no diffusion restriction abnormality to suggest acute  infarct.  No evidence of recent hemorrhage. There is no interval  intracranial mass effect or midline shift. No abnormal parenchymal  enhancement. There are a few nonspecific T2/FLAIR white matter  hyperintensities within the bilateral cerebral hemispheres, favor  chronic small vessel ischemic change.      CSF Spaces: The ventricles, sulci and basal cisterns are within  normal limits for age. Basilar cisterns are patent.      Extra-axial spaces: No extra-axial fluid collection.      Intracranial Flow Voids: No overt occlusion of the superior sagittal  sinus. As on comparison examination there are unchanged intraluminal  defects involving the left-greater-than-right distal transverse and  left sigmoid dural venous sinuses that may represent prominent  arachnoid granulations and/or chronic partially occlusive  thrombus  component.      Paranasal Sinuses: Visualized paranasal sinuses are clear.      Mastoids: Trace fluid within the inferior mastoid regions. Otherwise  well aerated.      Orbits: Normal.      Calvarium: No suspicious osseous marrow signal.        Impression    Stable MR appearance of the brain with 2.2 cm diameter meningioma  along the left frontal parafalcine region. Unchanged mass effect on  the adjacent left frontal lobe without vasogenic edema. No interval  acute intracranial process.      Unchanged appearance of the dural venous sinuses with potential  chronic partially occlusive thrombus components versus arachnoid  granulations.      No acute intracranial process.      MACRO:  None      Signed by: Ronal Cross 1/31/2025 9:13 AM  Dictation workstation:   [id number]       1:01 PM      Results were successfully communicated with the patient and they acknowledged their understanding.    Meningioma surveillance of similar size to prior imaging at 3 months interval.  Will place order for 1 year follow-up.

## 2025-02-17 ENCOUNTER — APPOINTMENT (OUTPATIENT)
Dept: UROLOGY | Facility: CLINIC | Age: 65
End: 2025-02-17
Payer: COMMERCIAL

## 2025-03-13 ENCOUNTER — HOSPITAL ENCOUNTER (OUTPATIENT)
Dept: RADIOLOGY | Facility: HOSPITAL | Age: 65
Discharge: HOME | End: 2025-03-13
Payer: COMMERCIAL

## 2025-03-13 DIAGNOSIS — N20.0 STAGHORN CALCULUS: ICD-10-CM

## 2025-03-13 PROCEDURE — 76770 US EXAM ABDO BACK WALL COMP: CPT

## 2025-03-27 ENCOUNTER — OFFICE VISIT (OUTPATIENT)
Dept: UROLOGY | Facility: CLINIC | Age: 65
End: 2025-03-27
Payer: COMMERCIAL

## 2025-03-27 VITALS — DIASTOLIC BLOOD PRESSURE: 82 MMHG | TEMPERATURE: 97.7 F | SYSTOLIC BLOOD PRESSURE: 135 MMHG | HEART RATE: 71 BPM

## 2025-03-27 DIAGNOSIS — R82.992 HYPEROXALURIA: Primary | ICD-10-CM

## 2025-03-27 DIAGNOSIS — N20.0 NEPHROLITHIASIS: ICD-10-CM

## 2025-03-27 PROCEDURE — 99024 POSTOP FOLLOW-UP VISIT: CPT | Performed by: UROLOGY

## 2025-03-27 PROCEDURE — 3079F DIAST BP 80-89 MM HG: CPT | Performed by: UROLOGY

## 2025-03-27 PROCEDURE — 3075F SYST BP GE 130 - 139MM HG: CPT | Performed by: UROLOGY

## 2025-03-27 PROCEDURE — 1036F TOBACCO NON-USER: CPT | Performed by: UROLOGY

## 2025-03-27 PROCEDURE — 99214 OFFICE O/P EST MOD 30 MIN: CPT | Performed by: UROLOGY

## 2025-03-27 NOTE — PROGRESS NOTES
PAST UROLOGICAL HISTORY:  Ms. Govea is a 64-year-old woman with a history of bilateral nephrolithiasis. CT abdomen/pelvis with IV contrast on 07/02/2024 showed bilateral nephrolithiasis, left greater than right. The left kidney had a 2.2 cm stone in the renal pelvis with additional upper and lower pole nonobstructing calculi. The right kidney had a similar size stone in the renal pelvis. She underwent right mini PCNL on 01/03/2025 for a large lower pole branching staghorn calculus >2.5 cm, which was uncomplicated. Left mini PCNL was performed on 11/22/2025 for a branching stone in the left lower pole >2 cm, also uncomplicated. Stone analysis revealed calcium oxalate composition.    I have independently reviewed and interpreted the patient's prior clinical documentation, imaging studies, and laboratory results as noted herein.    HPI TODAY 03/27/2025:    Bilateral Nephrolithiasis:  - Ms. Govea presented for follow-up after bilateral mini PCNLs and recent stent removal.  - Reports doing better overall since the procedures.  - Denies current hematuria, flank pain, or urinary symptoms.  - No fevers or chills reported.  - Had a renal bladder ultrasound one week ago that showed no hydronephrosis or residual stones.  - 24-hour urine collection performed on 03/13/2025 showed:  - Urine volume: 1.93 liters  - Calcium: 119 mg/24hr  - Oxalate: 46 mg/24hr (elevated)  - pH: 5.00  - Uric acid: 0.722 g/24hr    Stent Removal:  - Left ureteral stent was removed on 01/30/2025.  - Patient reported less discomfort with the left stent compared to the right.  - Procedure was performed without complications.    PAST MEDICAL HISTORY:  - Hypertension  - Hyperlipidemia  - Factor V Leiden  - Type 2 diabetes  - BMI 40.3  - Crohn's disease  - Cerebral venous sinus thrombosis    SOCIAL:  - Ms. Govea reports she had completely quit drinking soda when she first found out about her kidney stones at age 24.  - She has recently allowed herself  "to \"cheat\" a little with soda consumption over the last couple of months.  - Reports avoiding caffeine due to overreaction to it.    REVIEW OF SYSTEMS: A tailored review of systems was performed and all pertinent positives and negatives are listed in the HPI.    PHYSICAL EXAMINATION:  Gen: NAD  Pulm: No increased WOB on RA  Cards: WWP    ASSESSMENT/PLAN:    Bilateral Nephrolithiasis (N20.0)  - Assessment: Status post bilateral mini PCNL with subsequent stent removal. Recent ultrasound shows no residual stones or hydronephrosis. 24-hour urine collection reveals low urine volume and elevated oxalate levels.  - Plan:  * Increase fluid intake to achieve urine output of 2.5 L/day  * Dietary modifications:  - Limit oxalate-rich foods (tea, nuts, spinach, sweet potatoes, chocolate)  - Increase calcium intake with meals (dairy products, calcium-fortified beverages)  - Encourage citrus fruits and lemon/lime juice in water  - Avoid vitamin C supplements  - Discontinue pomegranate extract supplement  * Continue calcium citrate supplementation  * If consuming soda, choose lemon-lime varieties (e.g., diet Sprite or Starry)  * Renal ultrasound in 6-8 weeks  * Repeat 24-hour urine collection in 6 weeks  * Follow-up visit to review results and discuss further stone prevention strategies  - Counseling: Risks, benefits, and alternatives of stone prevention strategies were discussed, including the importance of dietary modifications and increased fluid intake. The relationship between Crohn's disease and increased oxalate absorption was explained. The potential for recurrent stone formation and the need for ongoing prevention measures were emphasized.      "

## 2025-04-09 ENCOUNTER — APPOINTMENT (OUTPATIENT)
Dept: GASTROENTEROLOGY | Facility: CLINIC | Age: 65
End: 2025-04-09
Payer: COMMERCIAL

## 2025-04-09 VITALS
WEIGHT: 245 LBS | SYSTOLIC BLOOD PRESSURE: 127 MMHG | BODY MASS INDEX: 41.83 KG/M2 | HEART RATE: 66 BPM | OXYGEN SATURATION: 97 % | DIASTOLIC BLOOD PRESSURE: 83 MMHG | HEIGHT: 64 IN

## 2025-04-09 DIAGNOSIS — R10.84 GENERALIZED ABDOMINAL PAIN: ICD-10-CM

## 2025-04-09 DIAGNOSIS — R11.2 NAUSEA AND VOMITING, UNSPECIFIED VOMITING TYPE: ICD-10-CM

## 2025-04-09 DIAGNOSIS — K50.118 CROHN'S DISEASE OF COLON WITH OTHER COMPLICATION: Primary | ICD-10-CM

## 2025-04-09 DIAGNOSIS — K52.9 COLITIS: ICD-10-CM

## 2025-04-09 DIAGNOSIS — N20.0 NEPHROLITHIASIS: ICD-10-CM

## 2025-04-09 PROCEDURE — 3079F DIAST BP 80-89 MM HG: CPT | Performed by: STUDENT IN AN ORGANIZED HEALTH CARE EDUCATION/TRAINING PROGRAM

## 2025-04-09 PROCEDURE — 3008F BODY MASS INDEX DOCD: CPT | Performed by: STUDENT IN AN ORGANIZED HEALTH CARE EDUCATION/TRAINING PROGRAM

## 2025-04-09 PROCEDURE — G2211 COMPLEX E/M VISIT ADD ON: HCPCS | Performed by: STUDENT IN AN ORGANIZED HEALTH CARE EDUCATION/TRAINING PROGRAM

## 2025-04-09 PROCEDURE — 3074F SYST BP LT 130 MM HG: CPT | Performed by: STUDENT IN AN ORGANIZED HEALTH CARE EDUCATION/TRAINING PROGRAM

## 2025-04-09 PROCEDURE — 1036F TOBACCO NON-USER: CPT | Performed by: STUDENT IN AN ORGANIZED HEALTH CARE EDUCATION/TRAINING PROGRAM

## 2025-04-09 PROCEDURE — 99215 OFFICE O/P EST HI 40 MIN: CPT | Performed by: STUDENT IN AN ORGANIZED HEALTH CARE EDUCATION/TRAINING PROGRAM

## 2025-04-09 PROCEDURE — 4010F ACE/ARB THERAPY RXD/TAKEN: CPT | Performed by: STUDENT IN AN ORGANIZED HEALTH CARE EDUCATION/TRAINING PROGRAM

## 2025-04-09 RX ORDER — SODIUM, POTASSIUM,MAG SULFATES 17.5-3.13G
SOLUTION, RECONSTITUTED, ORAL ORAL
Qty: 2 EACH | Refills: 0 | Status: SHIPPED | OUTPATIENT
Start: 2025-04-09

## 2025-04-09 NOTE — PROGRESS NOTES
Subjective     History of Present Illness:   Savi Govea is a 63 y.o. female with hx of T2DM (A1c of ~ 7), obesity who presents to clinic for evaluation of chronic symptoms of nausea, emesis, abdominal pain and diarrhea with new findings of colonic Crohn's disease on colonoscopy on Skyrizi (08/2024).      Today patient reports that her symptoms are completely resolved.  She rarely has any diarrhea.  She has no nausea or emesis after completion of urologic procedure for staghorn calculus in January.  She states this is t the best she has felt in a long time.    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    She has been compliant with Skyrizi  With no adverse effects.    With regards to her hx,  She was  started on first infusion of Skyrizi 08/26. Now on injections.   Things were going well up until this morning when her symptoms recurred specifically mostly nausea with nonbloody nonbilious emesis.  She has also noticed that her blood pressures have been lower at home as well as 110s over 50s before taking her antihypertensives and she is planning to follow-up with her cardiologist regarding this.    She has previously tried Copmazine for nausea that did not help  Bentyl caused leg cramps, but zofran might have as well.      Past Medical History   has a past medical history of Abnormal weight gain, Arrhythmia, Arthritis, Cervical disc disorder, Chronic diarrhea, COVID-19, Crohn's disease (Multi), Diabetes mellitus (Multi), Diverticulosis, History of blood clot in brain, Hyperlipidemia, Hypertension, Irregular heart beat, Irritable bowel syndrome, Joint pain, Low back pain, Lumbosacral disc disease, Nephrolithiasis, Neuropathy in diabetes (Multi), Numbness, Other abnormal glucose, Other conditions influencing health status, Personal history of other diseases of the circulatory system, Personal history of other diseases of the musculoskeletal system and connective tissue, Radiculopathy, lumbosacral  "region, Seasonal allergies, Sprain of ligaments of thoracic spine, initial encounter, Type 2 diabetes mellitus, Urinary tract infection, and Wears glasses.     Social History   reports that she quit smoking about 20 years ago. Her smoking use included cigarettes. She started smoking about 30 years ago. She has a 5 pack-year smoking history. She has never used smokeless tobacco. She reports that she does not currently use alcohol. She reports that she does not use drugs.     Family History  family history includes Cancer in her brother, brother, mother, and sister; Diabetes in her brother, maternal grandmother, and paternal grandfather; Graves' disease in her brother; Heart attack in her father; Hypertension in her father, maternal grandfather, and paternal grandfather; Multiple sclerosis in her brother; Skin cancer in her maternal grandmother; Stroke in her mother's sister; Thyroid cancer in her sister; Uterine cancer in her mother.     Allergies  Allergies   Allergen Reactions    Aspirin GI Upset     Jittery feeling    Caffeine Other     Extreme jitteryness    Cefditoren Other     \"So long ago I don't remember reaction\"    Cetirizine Other     Leg cramps    Fexofenadine Other     Leg cramps    Lactose GI Upset    Phenylephrine-Guaifenesin Other     Patient states that she gets very hyper and cannot sleep.    Pseudoephedrine Palpitations    Pseudoephedrine-Guaifenesin Other     \"Keeps me awake\"    Salicylates Other    Spectracef [Cefditoren Pivoxil] Other     \"So long ago I don't remember reaction\"    Vancomycin Hives, Itching and Rash       Medications  Current Outpatient Medications   Medication Instructions    alpha lipoic acid 300 mg capsule 1 capsule, 2 times daily    ammonium lactate (Lac-Hydrin) 12 % lotion Daily PRN    apixaban (ELIQUIS) 2.5 mg, oral, 2 times daily    calcium citrate-vitamin D3 200 mg-3.125 mcg (125 unit) tablet 1 tablet, Daily    CHROMIUM PICOLINATE ORAL 1 tablet, Daily    cinnamon " (CINNAMON) 500 mg, 2 times daily    glimepiride (AMARYL) 4 mg, 2 times daily    irbesartan (AVAPRO) 300 mg, Daily    Januvia 100 mg tablet Take 1 tablet (100 mg) by mouth once daily.    Jardiance 25 mg, Daily    magnesium oxide 400 mg magnesium capsule 1 capsule, Daily    metFORMIN (GLUCOPHAGE) 1,000 mg, 2 times daily (morning and late afternoon)    metoprolol succinate XL (TOPROL-XL) 100 mg, Daily    multivitamin tablet 1 tablet, Daily RT    ondansetron ODT (ZOFRAN-ODT) 8 mg, oral, Every 8 hours PRN    potassium citrate 99 mg, Daily RT    risankizumab-rzaa (SKYRIZI) 180 mg, subcutaneous, Every 8 weeks    saccharomyces boulardii (FLORASTOR) 250 mg, 2 times daily    sodium,potassium,mag sulfates (Suprep) 17.5-3.13-1.6 gram solution Take one bottle twice as directed by the prep instructions    Soma 350 mg, Daily    zinc gluconate 50 mg tablet 1 tablet, Daily        Objective   Visit Vitals  /83   Pulse 66        Physical Exam  Constitutional:       General: She is not in acute distress.     Appearance: Normal appearance.   HENT:      Head: Normocephalic and atraumatic.      Mouth/Throat:      Mouth: Mucous membranes are moist.   Eyes:      Extraocular Movements: Extraocular movements intact.   Pulmonary:      Effort: Pulmonary effort is normal.      Breath sounds: No stridor. No wheezing.   Abdominal:      General: Abdomen is flat. There is no distension.   Musculoskeletal:         General: Normal range of motion.   Skin:     General: Skin is warm and dry.   Neurological:      General: No focal deficit present.      Mental Status: She is alert.   Psychiatric:         Mood and Affect: Mood normal.         Judgment: Judgment normal.         Assessment/Plan   Savi Govea is a 63 y.o. female with hx of T2DM (A1c of ~ 7), obesity who presents to clinic for follow up of colonic Crohn's disease on  Skyrizi (08/2024). Diarrheal symptoms improving on Skyrizi however due to ongoing emesis that was out of proportion  to her IBD MR obtained demonstrating ?meningoma and venous sinus thrombosis now on apixaban (!2/2024). Also with diagnosis of extensive urolithiasis s/p lithotripsy for staghorn calculus in 01/2025 and now has clinical resolution of emesis and nausea.     She will continue on Skyrizi for management of her colonic CD. She is due for follow up colonoscopy to assess response to therapy. Notably fecal calpro normalized along with symptomatic improvement.    Follow up with Hematology re :venous sinus thrombosis on apixaban    Follow up with NSGY re: meningioma. Stable on most recent MRI    She is agreeable to plan all questions answered. Discussed need to follow up with PCP regarding HM for annual vaccines including flu/PNA vaccine given IS status and annual skin exam.         Problem List Items Addressed This Visit    None  Visit Diagnoses       Crohn's disease of colon with other complication    -  Primary    Relevant Medications    sodium,potassium,mag sulfates (Suprep) 17.5-3.13-1.6 gram solution    Other Relevant Orders    Colonoscopy Diagnostic (follow up Crohns)    Colitis        Nausea and vomiting, unspecified vomiting type        Generalized abdominal pain        Nephrolithiasis                           Melly Winn MD         My final recommendations will be communicated back to the requesting physician by way of shared Medical record or letter to requesting physician via fax.

## 2025-04-24 ENCOUNTER — TELEPHONE (OUTPATIENT)
Dept: GASTROENTEROLOGY | Facility: CLINIC | Age: 65
End: 2025-04-24
Payer: COMMERCIAL

## 2025-04-24 NOTE — TELEPHONE ENCOUNTER
Spoke to patient, ok to hold Eliquis 2-3 days prior, per Dr Bates  and Abeba 1 day prior, per Dr Cortez, to colonoscopy on 5/20/25.

## 2025-05-01 ENCOUNTER — TELEPHONE (OUTPATIENT)
Dept: GASTROENTEROLOGY | Facility: CLINIC | Age: 65
End: 2025-05-01
Payer: COMMERCIAL

## 2025-05-20 ENCOUNTER — APPOINTMENT (OUTPATIENT)
Dept: GASTROENTEROLOGY | Facility: EXTERNAL LOCATION | Age: 65
End: 2025-05-20
Payer: COMMERCIAL

## 2025-06-03 ENCOUNTER — ANESTHESIA EVENT (OUTPATIENT)
Dept: GASTROENTEROLOGY | Facility: EXTERNAL LOCATION | Age: 65
End: 2025-06-03

## 2025-06-05 ENCOUNTER — TELEPHONE (OUTPATIENT)
Dept: GASTROENTEROLOGY | Facility: CLINIC | Age: 65
End: 2025-06-05

## 2025-06-05 NOTE — TELEPHONE ENCOUNTER
Pt is on Eliquis and having a colonoscopy on 6/17/25.  Please advise a stop date.      TAURUS Rodriguez RN

## 2025-06-17 ENCOUNTER — ANESTHESIA (OUTPATIENT)
Dept: GASTROENTEROLOGY | Facility: EXTERNAL LOCATION | Age: 65
End: 2025-06-17

## 2025-06-17 ENCOUNTER — APPOINTMENT (OUTPATIENT)
Dept: GASTROENTEROLOGY | Facility: EXTERNAL LOCATION | Age: 65
End: 2025-06-17
Payer: COMMERCIAL

## 2025-06-17 VITALS
RESPIRATION RATE: 13 BRPM | BODY MASS INDEX: 42.68 KG/M2 | OXYGEN SATURATION: 97 % | HEIGHT: 64 IN | SYSTOLIC BLOOD PRESSURE: 105 MMHG | TEMPERATURE: 97.2 F | WEIGHT: 250 LBS | DIASTOLIC BLOOD PRESSURE: 47 MMHG | HEART RATE: 69 BPM

## 2025-06-17 DIAGNOSIS — K50.118 CROHN'S DISEASE OF COLON WITH OTHER COMPLICATION: ICD-10-CM

## 2025-06-17 PROCEDURE — 45385 COLONOSCOPY W/LESION REMOVAL: CPT | Performed by: STUDENT IN AN ORGANIZED HEALTH CARE EDUCATION/TRAINING PROGRAM

## 2025-06-17 RX ORDER — HYDROCORTISONE 25 MG/G
CREAM TOPICAL 2 TIMES DAILY
Qty: 28 G | Refills: 0 | Status: SHIPPED | OUTPATIENT
Start: 2025-06-17

## 2025-06-17 RX ORDER — SODIUM CHLORIDE 9 MG/ML
INJECTION, SOLUTION INTRAVENOUS CONTINUOUS PRN
Status: DISCONTINUED | OUTPATIENT
Start: 2025-06-17 | End: 2025-06-17

## 2025-06-17 RX ORDER — LIDOCAINE HYDROCHLORIDE 20 MG/ML
INJECTION, SOLUTION INFILTRATION; PERINEURAL AS NEEDED
Status: DISCONTINUED | OUTPATIENT
Start: 2025-06-17 | End: 2025-06-17

## 2025-06-17 RX ORDER — PROPOFOL 10 MG/ML
INJECTION, EMULSION INTRAVENOUS AS NEEDED
Status: DISCONTINUED | OUTPATIENT
Start: 2025-06-17 | End: 2025-06-17

## 2025-06-17 RX ORDER — ONDANSETRON HYDROCHLORIDE 2 MG/ML
4 INJECTION, SOLUTION INTRAVENOUS ONCE AS NEEDED
Status: DISCONTINUED | OUTPATIENT
Start: 2025-06-17 | End: 2025-06-18 | Stop reason: HOSPADM

## 2025-06-17 RX ADMIN — PROPOFOL 100 MG: 10 INJECTION, EMULSION INTRAVENOUS at 09:20

## 2025-06-17 RX ADMIN — LIDOCAINE HYDROCHLORIDE 3 ML: 20 INJECTION, SOLUTION INFILTRATION; PERINEURAL at 09:20

## 2025-06-17 RX ADMIN — PROPOFOL 100 MG: 10 INJECTION, EMULSION INTRAVENOUS at 09:31

## 2025-06-17 RX ADMIN — SODIUM CHLORIDE: 9 INJECTION, SOLUTION INTRAVENOUS at 09:18

## 2025-06-17 RX ADMIN — PROPOFOL 100 MG: 10 INJECTION, EMULSION INTRAVENOUS at 09:25

## 2025-06-17 SDOH — HEALTH STABILITY: MENTAL HEALTH: CURRENT SMOKER: 0

## 2025-06-17 ASSESSMENT — PAIN SCALES - GENERAL
PAINLEVEL_OUTOF10: 0 - NO PAIN

## 2025-06-17 ASSESSMENT — PAIN - FUNCTIONAL ASSESSMENT
PAIN_FUNCTIONAL_ASSESSMENT: 0-10

## 2025-06-17 NOTE — DISCHARGE INSTRUCTIONS

## 2025-06-17 NOTE — ANESTHESIA POSTPROCEDURE EVALUATION
Patient: Savi Govea    Procedure Summary       Date: 06/17/25 Room / Location: [facility]    Anesthesia Start: 0916 Anesthesia Stop: 0940    Procedure: COLONOSCOPY Diagnosis: Crohn's disease of colon with other complication    Scheduled Providers: Melly Winn MD Responsible Provider: ISSA Donald    Anesthesia Type: MAC ASA Status: 3            Anesthesia Type: MAC    Vitals Value Taken Time   /71 06/17/25 09:40   Temp 36.2 °C (97.2 °F) 06/17/25 09:40   Pulse 73 06/17/25 09:40   Resp 19 06/17/25 09:40   SpO2 94 % 06/17/25 09:40       Anesthesia Post Evaluation    Patient participation: complete - patient participated  Level of consciousness: awake  Pain management: adequate  Airway patency: patent  Cardiovascular status: acceptable  Respiratory status: acceptable  Hydration status: acceptable  Postoperative Nausea and Vomiting: none        No notable events documented.

## 2025-06-17 NOTE — H&P
Procedure H&P    Patient Profile-Procedures  Name Savi Govea  Date of Birth 1960  MRN [id number]  Address   [address] [id number] [facility] [address]    Primary Phone Number [phone]  Secondary Phone Number    PCP Mary Cortez    Procedure(s):  Procedures: Colonoscopy  Primary contact name and number   Extended Emergency Contact Information  Primary Emergency Contact: Sana Cortez  Address: [address] [place] [address] [place] [address] [facility]  Mobile Phone: [phone]  Relation: Daughter    General Health  Weight   Vitals:    06/17/25 0845   Weight: 113 kg (250 lb)     BMI Body mass index is 42.91 kg/m².    Allergies  RX Allergies[1]    Past Medical History   Medical History[2]    Provider assessment  Diagnosis: Follow up Crohn's Colitis   Medication Reviewed - yes  Prior to Admission medications    Medication Sig Start Date End Date Taking? Authorizing Provider   alpha lipoic acid 300 mg capsule Take 1 capsule by mouth 2 times a day.   Yes Historical Provider, MD   calcium citrate-vitamin D3 200 mg-3.125 mcg (125 unit) tablet Take 1 tablet by mouth once daily. 3/19/15  Yes Historical Provider, MD   CHROMIUM PICOLINATE ORAL Take 1 tablet by mouth once daily.   Yes Historical Provider, MD   Cinnamon 500 mg capsule Take 1 capsule (500 mg) by mouth 2 times a day. 10/2/19  Yes Historical Provider, MD   glimepiride (Amaryl) 4 mg tablet Take 1 tablet (4 mg) by mouth 2 times a day.   Yes Historical Provider, MD   irbesartan (Avapro) 300 mg tablet Take 1 tablet (300 mg) by mouth once daily. 10/2/19  Yes Historical Provider, MD   Jardiance 25 mg Take 1 tablet (25 mg) by mouth once daily.   Yes Historical Provider, MD   magnesium oxide 400 mg magnesium capsule Take 1 capsule (400 mg) by mouth once daily.   Yes Historical Provider, MD   metFORMIN (Glucophage) 1,000 mg tablet Take 1 tablet (1,000 mg) by mouth 2 times daily (morning and late  afternoon).   Yes Historical Provider, MD   metoprolol succinate XL (Toprol-XL) 100 mg 24 hr tablet Take 1 tablet (100 mg) by mouth once daily. 10/2/19  Yes Historical Provider, MD   multivitamin tablet Take 1 tablet by mouth once daily.   Yes Historical Provider, MD   saccharomyces boulardii (Florastor) 250 mg capsule Take 1 capsule (250 mg) by mouth 2 times a day.   Yes Historical Provider, MD   Soma 350 mg tablet Take 1 tablet (350 mg) by mouth once daily.   Yes Historical Provider, MD   zinc gluconate 50 mg tablet Take 1 tablet by mouth once daily.   Yes Historical Provider, MD   sodium,potassium,mag sulfates (Suprep) 17.5-3.13-1.6 gram solution Take one bottle twice as directed by the prep instructions 4/9/25 6/17/25 Yes Melly Winn MD   ammonium lactate (Lac-Hydrin) 12 % lotion Apply topically once daily as needed. As directed 8/29/23   Historical Provider, MD   apixaban (Eliquis) 2.5 mg tablet Take 1 tablet (2.5 mg) by mouth 2 times a day. 1/31/25   Ugo Bates MD   Januvia 100 mg tablet Take 1 tablet (100 mg) by mouth once daily. 8/3/23   Historical Provider, MD   potassium citrate 99 mg capsule Take 99 mg by mouth once daily.    Historical Provider, MD   risankizumab-rzaa (Skyrizi) 180 mg/1.2 mL (150 mg/mL) wearable injector injection Inject 1.2 mL (180 mg) under the skin every 8 (eight) weeks. 8/28/24 8/28/25  Melly Winn MD   ondansetron ODT (Zofran-ODT) 8 mg disintegrating tablet Take 1 tablet (8 mg) by mouth every 8 hours if needed for nausea or vomiting. 9/11/24 6/17/25  Melly Winn MD       Physical Exam  Vitals:    06/17/25 0845   BP: 153/82   Pulse: 75   Resp: 17   Temp: 36.6 °C (97.9 °F)   SpO2: 96%        General: A&Ox3, NAD.  HEENT: AT/NC.   CV: RRR. No murmur.  Resp: CTA bilaterally. No wheezing, rhonchi or rales.   GI: Soft, NT/ND. BSx4.  Extrem: No edema. Pulses intact.  Neuro: No focal deficits.   Psych: Normal mood and affect.      Procedure Plan - pre-procedural  "(re)assesment completed by physician:  discharge/transfer patient when discharge criteria met    Melly Winn MD  6/17/2025 9:07 AM           [1]   Allergies  Allergen Reactions    Aspirin GI Upset     Jittery feeling    Caffeine Other     Extreme jitteryness    Cefditoren Other     \"So long ago I don't remember reaction\"    Cetirizine Other     Leg cramps    Fexofenadine Other     Leg cramps    Lactose GI Upset    Phenylephrine-Guaifenesin Other     Patient states that she gets very hyper and cannot sleep.    Pseudoephedrine Palpitations    Pseudoephedrine-Guaifenesin Other     \"Keeps me awake\"    Salicylates Other    Spectracef [Cefditoren Pivoxil] Other     \"So long ago I don't remember reaction\"    Vancomycin Hives, Itching and Rash   [2]   Past Medical History:  Diagnosis Date    Abnormal weight gain     Weight gain, abnormal    Arrhythmia     Arthritis     Cervical disc disorder     Chronic diarrhea     COVID-19     VACCINATED    Crohn's disease (Multi)     Diabetes mellitus (Multi)     Diverticulosis     History of blood clot in brain     Hyperlipidemia     Hypertension     Irregular heart beat     Irritable bowel syndrome     Joint pain     Low back pain     Lumbosacral disc disease     Nephrolithiasis     Neuropathy in diabetes (Multi)     Numbness     Other abnormal glucose     Abnormal glucose    Other conditions influencing health status     Neck sprain and strain    Personal history of other diseases of the circulatory system     History of hypertension    Personal history of other diseases of the musculoskeletal system and connective tissue     History of degenerative disc disease    Radiculopathy, lumbosacral region     Lumbosacral neuritis    Seasonal allergies     Sprain of ligaments of thoracic spine, initial encounter     Thoracic sprain and strain    Type 2 diabetes mellitus     Urinary tract infection     Wears glasses      "

## 2025-06-17 NOTE — ANESTHESIA PREPROCEDURE EVALUATION
Patient: Savi Govea    Procedure Information       Date/Time: 25 0920    Scheduled providers: Melly Winn MD    Procedure: COLONOSCOPY    Location: [facility]            Relevant Problems   Cardiac   (+) Essential hypertension   (+) Hyperlipidemia      Neuro   (+) Acute lumbar radiculopathy   (+) Cervical neuritis   (+) Left cervical radiculopathy      GI   (+) Crohn's disease of colon with complication (Multi)      /Renal   (+) Staghorn calculus      Endocrine   (+) Obesity   (+) Type 2 diabetes mellitus without complication, without long-term current use of insulin      Hematology   (+) Factor V Leiden carrier (Multi)      Musculoskeletal   (+) Osteoarthritis of left hip   (+) Osteoarthritis, knee   (+) Primary osteoarthritis of right knee   (+) Unilateral primary osteoarthritis, left knee       Clinical information reviewed:   Tobacco  Allergies  Meds   Med Hx  Surg Hx  OB Status  Fam Hx  Soc   Hx        NPO Detail:  NPO/Void Status  Date of Last Liquid: 25  Time of Last Liquid: 0  Date of Last Solid: 06/15/25         Physical Exam    Airway  Mallampati: II  TM distance: >3 FB  Neck ROM: full  Mouth openin finger widths     Cardiovascular - normal exam   Dental - normal exam     Pulmonary - normal exam   Abdominal - normal exam           Anesthesia Plan    History of general anesthesia?: yes  History of complications of general anesthesia?: no    ASA 3     MAC     The patient is not a current smoker.  Patient was not previously instructed to abstain from smoking on day of procedure.  Patient did not smoke on day of procedure.    intravenous induction   Anesthetic plan and risks discussed with patient.

## 2025-06-25 LAB
LABORATORY COMMENT REPORT: NORMAL
PATH REPORT.FINAL DX SPEC: NORMAL
PATH REPORT.GROSS SPEC: NORMAL
PATH REPORT.TOTAL CANCER: NORMAL

## 2025-07-09 ENCOUNTER — APPOINTMENT (OUTPATIENT)
Dept: GASTROENTEROLOGY | Facility: CLINIC | Age: 65
End: 2025-07-09
Payer: COMMERCIAL

## 2025-07-09 VITALS
WEIGHT: 253 LBS | HEART RATE: 63 BPM | DIASTOLIC BLOOD PRESSURE: 77 MMHG | OXYGEN SATURATION: 97 % | BODY MASS INDEX: 43.19 KG/M2 | SYSTOLIC BLOOD PRESSURE: 126 MMHG | HEIGHT: 64 IN

## 2025-07-09 DIAGNOSIS — K50.118 CROHN'S DISEASE OF COLON WITH OTHER COMPLICATION: Primary | ICD-10-CM

## 2025-07-09 DIAGNOSIS — K50.119 CROHN'S DISEASE OF COLON WITH COMPLICATION (MULTI): ICD-10-CM

## 2025-07-09 PROCEDURE — 3074F SYST BP LT 130 MM HG: CPT | Performed by: STUDENT IN AN ORGANIZED HEALTH CARE EDUCATION/TRAINING PROGRAM

## 2025-07-09 PROCEDURE — 3008F BODY MASS INDEX DOCD: CPT | Performed by: STUDENT IN AN ORGANIZED HEALTH CARE EDUCATION/TRAINING PROGRAM

## 2025-07-09 PROCEDURE — 1036F TOBACCO NON-USER: CPT | Performed by: STUDENT IN AN ORGANIZED HEALTH CARE EDUCATION/TRAINING PROGRAM

## 2025-07-09 PROCEDURE — 3078F DIAST BP <80 MM HG: CPT | Performed by: STUDENT IN AN ORGANIZED HEALTH CARE EDUCATION/TRAINING PROGRAM

## 2025-07-09 PROCEDURE — 99215 OFFICE O/P EST HI 40 MIN: CPT | Performed by: STUDENT IN AN ORGANIZED HEALTH CARE EDUCATION/TRAINING PROGRAM

## 2025-07-09 PROCEDURE — 4010F ACE/ARB THERAPY RXD/TAKEN: CPT | Performed by: STUDENT IN AN ORGANIZED HEALTH CARE EDUCATION/TRAINING PROGRAM

## 2025-07-09 NOTE — PROGRESS NOTES
Subjective     History of Present Illness:   Savi Govea is a 63 y.o. female with hx of T2DM (A1c of ~ 7), obesity who presents to clinic for follow up of colonic Crohn's disease c/b nephrolithiasis on Skyrizi, meningioma, central venous thrombus on apixaban who presents to clinic for follow up of her Crohn's disease.     Patient is doing well. No acute complaints. No diarrhea or significant abdominal pain. Energy levels are OK, improved from last year.     Her most recent colonoscopy showed endoscopic remission. Biopsies of right colon did show minimal acute inflammation.        She has no nausea or emesis after completion of urologic procedure for staghorn calculus in January.  She states this is t the best she has felt in a long time.    She has been compliant with Skyrizi  With no adverse effects.    With regards to her hx,  She was  started on first infusion of Skyrizi 08/26. Now on injections.   Things were going well up until this morning when her symptoms recurred specifically mostly nausea with nonbloody nonbilious emesis.  She has also noticed that her blood pressures have been lower at home as well as 110s over 50s before taking her antihypertensives and she is planning to follow-up with her cardiologist regarding this.    She has previously tried Copmazine for nausea that did not help  Bentyl caused leg cramps, but zofran might have as well.      Past Medical History   has a past medical history of Abnormal weight gain, Arrhythmia, Arthritis, Cervical disc disorder, Chronic diarrhea, COVID-19, Crohn's disease (Multi), Diabetes mellitus (Multi), Diverticulosis, History of blood clot in brain, Hyperlipidemia, Hypertension, Irregular heart beat, Irritable bowel syndrome, Joint pain, Low back pain, Lumbosacral disc disease, Nephrolithiasis, Neuropathy in diabetes (Multi), Numbness, Other abnormal glucose, Other conditions influencing health status, Personal history of other diseases of the circulatory  "system, Personal history of other diseases of the musculoskeletal system and connective tissue, Radiculopathy, lumbosacral region, Seasonal allergies, Sprain of ligaments of thoracic spine, initial encounter, Type 2 diabetes mellitus, Urinary tract infection, and Wears glasses.     Social History   reports that she quit smoking about 20 years ago. Her smoking use included cigarettes. She started smoking about 30 years ago. She has a 5 pack-year smoking history. She has never used smokeless tobacco. She reports current alcohol use of about 1.0 standard drink of alcohol per week. She reports that she does not use drugs.     Family History  family history includes Cancer in her brother, brother, brother, brother, mother, and sister; Diabetes in her brother, brother, brother, maternal grandfather, maternal grandmother, and paternal grandfather; Graves' disease in her brother; Heart attack in her father; Hypertension in her father, maternal grandfather, and paternal grandfather; Multiple sclerosis in her brother; Skin cancer in her maternal grandmother; Stroke in her mother's sister and another family member; Thyroid cancer in her sister; Uterine cancer in her mother.     Allergies  Allergies   Allergen Reactions    Aspirin GI Upset     Jittery feeling    Caffeine Other     Extreme jitteryness    Cefditoren Other     \"So long ago I don't remember reaction\"    Cetirizine Other     Leg cramps    Fexofenadine Other     Leg cramps    Lactose GI Upset    Phenylephrine-Guaifenesin Other     Patient states that she gets very hyper and cannot sleep.    Pseudoephedrine Palpitations    Pseudoephedrine-Guaifenesin Other     \"Keeps me awake\"    Salicylates Other    Spectracef [Cefditoren Pivoxil] Other     \"So long ago I don't remember reaction\"    Vancomycin Hives, Itching and Rash       Medications  Current Outpatient Medications   Medication Instructions    alpha lipoic acid 300 mg capsule 1 capsule, 2 times daily    ammonium " lactate (Lac-Hydrin) 12 % lotion Daily PRN    apixaban (ELIQUIS) 2.5 mg, oral, 2 times daily    calcium citrate-vitamin D3 200 mg-3.125 mcg (125 unit) tablet 1 tablet, Daily    CHROMIUM PICOLINATE ORAL 1 tablet, Daily    cinnamon (CINNAMON) 500 mg, 2 times daily    glimepiride (AMARYL) 4 mg, 2 times daily    hydrocortisone (Anusol-HC) 2.5 % rectal cream rectal, 2 times daily    irbesartan (AVAPRO) 300 mg, Daily    Januvia 100 mg tablet Take 1 tablet (100 mg) by mouth once daily.    Jardiance 25 mg, Daily    magnesium oxide 400 mg magnesium capsule 1 capsule, Daily    metFORMIN (GLUCOPHAGE) 1,000 mg, 2 times daily (morning and late afternoon)    metoprolol succinate XL (TOPROL-XL) 100 mg, Daily    multivitamin tablet 1 tablet, Daily RT    potassium citrate 99 mg, Daily RT    risankizumab-rzaa (SKYRIZI) 180 mg, subcutaneous, Every 8 weeks    saccharomyces boulardii (FLORASTOR) 250 mg, 2 times daily    Soma 350 mg, Daily    zinc gluconate 50 mg tablet 1 tablet, Daily        Objective   Visit Vitals  /77   Pulse 63        Physical Exam  Constitutional:       General: She is not in acute distress.     Appearance: Normal appearance.   HENT:      Head: Normocephalic and atraumatic.      Mouth/Throat:      Mouth: Mucous membranes are moist.   Eyes:      Extraocular Movements: Extraocular movements intact.   Pulmonary:      Effort: Pulmonary effort is normal.      Breath sounds: No stridor. No wheezing.   Abdominal:      General: Abdomen is flat. There is no distension.   Musculoskeletal:         General: Normal range of motion.   Skin:     General: Skin is warm and dry.   Neurological:      General: No focal deficit present.      Mental Status: She is alert.   Psychiatric:         Mood and Affect: Mood normal.         Judgment: Judgment normal.         Assessment/Plan   Savi Govea is a 63 y.o. female with hx of T2DM (A1c of ~ 7), obesity who presents to clinic for follow up of colonic Crohn's disease on   Skyrizi (08/2024). Diarrheal symptoms improving on Skyrizi however due to ongoing emesis that was out of proportion to her IBD MR obtained demonstrating ?meningoma and venous sinus thrombosis now on apixaban (!2/2024). Also with diagnosis of extensive urolithiasis s/p lithotripsy for staghorn calculus in 01/2025 and now has clinical resolution of emesis and nausea.      We reviewed colonoscopy results above. May have some mild histologic activity in right colon however this is minimal. Notably fecal calpro normalized along with symptomatic improvement. She will continue on Skyrizi for management of her colonic CD.      Follow up with Hematology re :venous sinus thrombosis on apixaban    Follow up with NSGY re: meningioma. Stable on most recent MRI    She is agreeable to plan all questions answered. Discussed need to follow up with PCP regarding HM for annual vaccines including flu/PNA vaccine given IS status and annual skin exam. Discussed need to remain up to date on PAP smears (completed in 2024)        Problem List Items Addressed This Visit       Crohn's disease of colon with complication (Multi)    Relevant Medications    risankizumab-rzaa (Skyrizi) 180 mg/1.2 mL (150 mg/mL) wearable injector injection     Other Visit Diagnoses         Crohn's disease of colon with other complication    -  Primary    Relevant Orders    Calprotectin, Fecal    CBC and Auto Differential    Comprehensive metabolic panel    Tissue Transglutaminase IgA    IgA    Ferritin    Iron and TIBC                         Melly Winn MD         My final recommendations will be communicated back to the requesting physician by way of shared Medical record or letter to requesting physician via fax.

## 2025-08-07 DIAGNOSIS — K50.119 CROHN'S DISEASE OF COLON WITH COMPLICATION (MULTI): ICD-10-CM

## 2025-08-07 NOTE — PROGRESS NOTES
The following prescription(s) has/have been re-routed to [facility] per patient request:  Requested Prescriptions     Signed Prescriptions Disp Refills    risankizumab-rzaa (Skyrizi) 180 mg/1.2 mL (150 mg/mL) wearable injector injection 1.2 mL 5     Sig: Inject 180 mg via on body injector under the skin every 8 (eight) weeks.      Prescriptions ordered pursuant to pharmacist-provider collaborative practice agreement.

## 2025-11-10 ENCOUNTER — APPOINTMENT (OUTPATIENT)
Dept: GASTROENTEROLOGY | Facility: CLINIC | Age: 65
End: 2025-11-10
Payer: COMMERCIAL

## 2025-11-26 ENCOUNTER — APPOINTMENT (OUTPATIENT)
Dept: GASTROENTEROLOGY | Facility: CLINIC | Age: 65
End: 2025-11-26
Payer: COMMERCIAL

## (undated) DEVICE — TOWEL PACK, STERILE, 4/PACK, BLUE

## (undated) DEVICE — GOWN, SURGICAL, ROYAL SILK, XL, STERILE

## (undated) DEVICE — COVER HANDLE LIGHT, STERIS, BLUE, STERILE

## (undated) DEVICE — GUIDEWIRE, DUAL SENSOR, .035 X 150 STRAIGHT,  3CM

## (undated) DEVICE — SCOPE, LITHOVUE SUD ONLY, GLOBAL STANDARD

## (undated) DEVICE — GLOVE, SURGICAL, PROTEXIS PI BLUE W/NEUTHERA, 8.0, PF, LF

## (undated) DEVICE — GLOVE, SURGICAL, PROTEXIS PI MICRO, 8.0, PF, LF

## (undated) DEVICE — GLOVE, SURGICAL, PROTEXIS PI , 7.5, PF, LF

## (undated) DEVICE — STRAP, ARM BOARD, 32 X 1.5

## (undated) DEVICE — SUTURE, VICRYL, 4-0, 27 IN, PS-2, UNDYED

## (undated) DEVICE — NEEDLE, SPINAL, QUINCKE, 18 G X 3.5 IN, PINK HUB

## (undated) DEVICE — Device

## (undated) DEVICE — GUIDEWIRE, AMPLATZ, SUPER STIFF, .035 FLOPPY

## (undated) DEVICE — IRRIGATION KIT, PUMPING, SINGLE ACTION, SYRINGE, 10 CC

## (undated) DEVICE — LASER FIBER, HOLMIUM 550

## (undated) DEVICE — COVER, PLASTIC, MAYO STAND, 29.5IN X 55.5IN

## (undated) DEVICE — CONTAINER, SPECIMEN, 4 OZ, OR PEEL PACK, STERILE

## (undated) DEVICE — MAT, FLOOR, STANDARD FLUID BARRIER, 32X44, GREEN

## (undated) DEVICE — DILATOR SET, COAXIAL STYLET, 8/10

## (undated) DEVICE — SUTURE, MONOCRYL, 3-0, PS-1 27IN, UNDYED

## (undated) DEVICE — HOOD, SURGICAL, FLYTE, T7

## (undated) DEVICE — MARKER, SKIN, DUAL TIP, W/RULER AND LABEL

## (undated) DEVICE — TRAY, SURESTEP, SILICONE DRAINAGE BAG, STATLOCK, 16FR

## (undated) DEVICE — 8MM, SINGLE USE GRASPING FORCEPS

## (undated) DEVICE — ADHESIVE, SKIN, LIQUIBAND EXCEED

## (undated) DEVICE — NEEDLE, NAVIGUIDE, PERCUTANEOUS ACCESS, 18G 16CM

## (undated) DEVICE — BLADE, STRYKER, OSC, 19 X 90 X 1.27G

## (undated) DEVICE — MARKER, SKIN, W/ RULER, LF, STERILE

## (undated) DEVICE — GOWN, SURGICAL, SMARTGOWN, XX-LARGE, STERILE

## (undated) DEVICE — Y-ADAPTER, GATEWAY ADVANTAGE

## (undated) DEVICE — DRESSING, MEPILEX BORDER, POST-OP AG, 4 X 12 IN

## (undated) DEVICE — BANDAGE, ELASTIC, 6 X 10YD, BEIGE, LF

## (undated) DEVICE — COUNTER, NEEDLE, FOAM BLOCK, W/MAGNET, 20 COUNT

## (undated) DEVICE — MANIFOLD, 4 PORT NEPTUNE STANDARD

## (undated) DEVICE — SYRINGE, 10 CC, LUER LOCK

## (undated) DEVICE — CUFF, TOURNIQUET, DUAL PORT, SNG BLADDER, 34 IN, PLC

## (undated) DEVICE — PREP TRAY, VAGINAL

## (undated) DEVICE — CEMENT, MIXEVAC III, 10S BOWL, KNEES

## (undated) DEVICE — TUBING, SUCTION, CONNECTING, 9/32 X 10FT, LF

## (undated) DEVICE — CRADLE, ARM, FOAM

## (undated) DEVICE — ADHESIVE, SKIN, DERMABOND ADVANCED, 15CM, PEN-STYLE

## (undated) DEVICE — SYRINGE, 50 CC, LUER LOCK

## (undated) DEVICE — DRAPE, PCNL

## (undated) DEVICE — TOWEL PACK 10-PK

## (undated) DEVICE — SYRINGE, 20 CC, LUER LOCK, MONOJECT, W/O CAP, LF

## (undated) DEVICE — SOLUTION, IRRIGATION, USP, SODIUM CHLORIDE 0.9%, 3000 ML

## (undated) DEVICE — GUIDEWIRE, AMPLATZ, SUPER STIFF, 035 STRAIGHT

## (undated) DEVICE — PADDING, CAST, SPECIALIST, 6 IN X 4 YD, STERILE

## (undated) DEVICE — SALINE NORMAL (100/PK)

## (undated) DEVICE — APPLICATOR, CHLORAPREP, W/ORANGE TINT, 26ML

## (undated) DEVICE — CATHETER, URETERAL, POLLACK, OPEN END, 5.5 FR, 70 CM

## (undated) DEVICE — BASKET, STONE, RETRIEVAL, NITINOL (4WIRE, 1.9 0 TIP)

## (undated) DEVICE — NEEDLE, SAFETY, 18 G X 1.5 IN

## (undated) DEVICE — SUTURE, VICRYL, 1, 27 IN, CT-1 CR, UNDYED

## (undated) DEVICE — GLOVE, SURGICAL, PROTEXIS PI BLUE W/NEUTHERA, 7.5, PF, LF

## (undated) DEVICE — STRAP, VELCRO, BODY, 4 X 60IN, NS

## (undated) DEVICE — SOLUTION, IRRIGATION, STERILE WATER, 1000 ML, POUR BOTTLE

## (undated) DEVICE — SHEATH, URETERAL ACCESS, NAVIGATOR 11/13F X 28CM

## (undated) DEVICE — DRAPE, C-ARM IMAGE

## (undated) DEVICE — SOLUTION, IRRIGATION, USP, SODIUM CHLORIDE 0.9%, 3000 ML, BAG

## (undated) DEVICE — SOLUTION, IRRIGATION, SODIUM CHLORIDE 0.9%, 1000 ML, POUR BOTTLE

## (undated) DEVICE — GOWN, SURGICAL, ROYAL SILK, LG, STERILE

## (undated) DEVICE — WRAP, DEMAYO, LEG, STERILE

## (undated) DEVICE — PREP, IODOPHOR, W/ALCOHOL, DURAPREP, W/APPLICATOR, 26 CC

## (undated) DEVICE — TUBING, IRRIGATION, HIGH FLOW, HAND PIECE SET

## (undated) DEVICE — WOUND SYSTEM, DEBRIDEMENT & CLEANING, O.R DUOPAK

## (undated) DEVICE — IMMOBILIZER, KNEE, 19IN, ADJUSTABLE FOAM, W/ ELASTIC STRAPS

## (undated) DEVICE — BATH BLANKET STERILE